# Patient Record
Sex: MALE | Race: BLACK OR AFRICAN AMERICAN | NOT HISPANIC OR LATINO | Employment: UNEMPLOYED | URBAN - METROPOLITAN AREA
[De-identification: names, ages, dates, MRNs, and addresses within clinical notes are randomized per-mention and may not be internally consistent; named-entity substitution may affect disease eponyms.]

---

## 2020-03-23 ENCOUNTER — APPOINTMENT (EMERGENCY)
Dept: RADIOLOGY | Facility: HOSPITAL | Age: 25
DRG: 386 | End: 2020-03-23
Payer: COMMERCIAL

## 2020-03-23 ENCOUNTER — HOSPITAL ENCOUNTER (INPATIENT)
Facility: HOSPITAL | Age: 25
LOS: 2 days | Discharge: HOME | DRG: 386 | End: 2020-03-25
Attending: EMERGENCY MEDICINE | Admitting: INTERNAL MEDICINE
Payer: COMMERCIAL

## 2020-03-23 DIAGNOSIS — R10.9 ABDOMINAL PAIN: ICD-10-CM

## 2020-03-23 DIAGNOSIS — K50.90 EXACERBATION OF CROHN'S DISEASE (HCC): Primary | ICD-10-CM

## 2020-03-23 PROBLEM — K52.9 ENTERITIS: Status: ACTIVE | Noted: 2020-03-23

## 2020-03-23 PROBLEM — J45.909 ASTHMA: Status: ACTIVE | Noted: 2020-03-23

## 2020-03-23 LAB
ALBUMIN SERPL BCP-MCNC: 3.3 G/DL (ref 3.5–5)
ALP SERPL-CCNC: 111 U/L (ref 46–116)
ALT SERPL W P-5'-P-CCNC: 11 U/L (ref 12–78)
ANION GAP SERPL CALCULATED.3IONS-SCNC: 9 MMOL/L (ref 4–13)
APTT PPP: 28 SECONDS (ref 23–37)
AST SERPL W P-5'-P-CCNC: 11 U/L (ref 5–45)
BASOPHILS # BLD AUTO: 0.05 THOUSANDS/ΜL (ref 0–0.1)
BASOPHILS NFR BLD AUTO: 0 % (ref 0–1)
BILIRUB SERPL-MCNC: 0.4 MG/DL (ref 0.2–1)
BUN SERPL-MCNC: 13 MG/DL (ref 5–25)
CALCIUM SERPL-MCNC: 9 MG/DL (ref 8.3–10.1)
CHLORIDE SERPL-SCNC: 100 MMOL/L (ref 100–108)
CO2 SERPL-SCNC: 29 MMOL/L (ref 21–32)
CREAT SERPL-MCNC: 1.09 MG/DL (ref 0.6–1.3)
EOSINOPHIL # BLD AUTO: 0.03 THOUSAND/ΜL (ref 0–0.61)
EOSINOPHIL NFR BLD AUTO: 0 % (ref 0–6)
ERYTHROCYTE [DISTWIDTH] IN BLOOD BY AUTOMATED COUNT: 13.5 % (ref 11.6–15.1)
GFR SERPL CREATININE-BSD FRML MDRD: 110 ML/MIN/1.73SQ M
GLUCOSE SERPL-MCNC: 105 MG/DL (ref 65–140)
HCT VFR BLD AUTO: 45.6 % (ref 36.5–49.3)
HGB BLD-MCNC: 15 G/DL (ref 12–17)
HOLD SPECIMEN: NORMAL
IMM GRANULOCYTES # BLD AUTO: 0.05 THOUSAND/UL (ref 0–0.2)
IMM GRANULOCYTES NFR BLD AUTO: 0 % (ref 0–2)
INR PPP: 1.08 (ref 0.84–1.19)
LIPASE SERPL-CCNC: 112 U/L (ref 73–393)
LYMPHOCYTES # BLD AUTO: 1 THOUSANDS/ΜL (ref 0.6–4.47)
LYMPHOCYTES NFR BLD AUTO: 7 % (ref 14–44)
MCH RBC QN AUTO: 29.1 PG (ref 26.8–34.3)
MCHC RBC AUTO-ENTMCNC: 32.9 G/DL (ref 31.4–37.4)
MCV RBC AUTO: 88 FL (ref 82–98)
MONOCYTES # BLD AUTO: 1.13 THOUSAND/ΜL (ref 0.17–1.22)
MONOCYTES NFR BLD AUTO: 8 % (ref 4–12)
NEUTROPHILS # BLD AUTO: 12.34 THOUSANDS/ΜL (ref 1.85–7.62)
NEUTS SEG NFR BLD AUTO: 85 % (ref 43–75)
NRBC BLD AUTO-RTO: 0 /100 WBCS
PLATELET # BLD AUTO: 440 THOUSANDS/UL (ref 149–390)
PMV BLD AUTO: 9 FL (ref 8.9–12.7)
POTASSIUM SERPL-SCNC: 3.9 MMOL/L (ref 3.5–5.3)
PROT SERPL-MCNC: 8.1 G/DL (ref 6.4–8.2)
PROTHROMBIN TIME: 14.3 SECONDS (ref 11.6–14.5)
RBC # BLD AUTO: 5.16 MILLION/UL (ref 3.88–5.62)
SODIUM SERPL-SCNC: 138 MMOL/L (ref 136–145)
WBC # BLD AUTO: 14.6 THOUSAND/UL (ref 4.31–10.16)

## 2020-03-23 PROCEDURE — 81401 MOPATH PROCEDURE LEVEL 2: CPT | Performed by: PHYSICIAN ASSISTANT

## 2020-03-23 PROCEDURE — 99285 EMERGENCY DEPT VISIT HI MDM: CPT | Performed by: EMERGENCY MEDICINE

## 2020-03-23 PROCEDURE — 96375 TX/PRO/DX INJ NEW DRUG ADDON: CPT

## 2020-03-23 PROCEDURE — 99285 EMERGENCY DEPT VISIT HI MDM: CPT

## 2020-03-23 PROCEDURE — 96374 THER/PROPH/DIAG INJ IV PUSH: CPT

## 2020-03-23 PROCEDURE — 85025 COMPLETE CBC W/AUTO DIFF WBC: CPT | Performed by: EMERGENCY MEDICINE

## 2020-03-23 PROCEDURE — 99222 1ST HOSP IP/OBS MODERATE 55: CPT | Performed by: INTERNAL MEDICINE

## 2020-03-23 PROCEDURE — 99223 1ST HOSP IP/OBS HIGH 75: CPT | Performed by: INTERNAL MEDICINE

## 2020-03-23 PROCEDURE — 85730 THROMBOPLASTIN TIME PARTIAL: CPT | Performed by: EMERGENCY MEDICINE

## 2020-03-23 PROCEDURE — 80053 COMPREHEN METABOLIC PANEL: CPT | Performed by: EMERGENCY MEDICINE

## 2020-03-23 PROCEDURE — 74177 CT ABD & PELVIS W/CONTRAST: CPT

## 2020-03-23 PROCEDURE — 36415 COLL VENOUS BLD VENIPUNCTURE: CPT | Performed by: EMERGENCY MEDICINE

## 2020-03-23 PROCEDURE — 82542 COL CHROMOTOGRAPHY QUAL/QUAN: CPT | Performed by: PHYSICIAN ASSISTANT

## 2020-03-23 PROCEDURE — 85610 PROTHROMBIN TIME: CPT | Performed by: EMERGENCY MEDICINE

## 2020-03-23 PROCEDURE — 83690 ASSAY OF LIPASE: CPT | Performed by: EMERGENCY MEDICINE

## 2020-03-23 PROCEDURE — 96361 HYDRATE IV INFUSION ADD-ON: CPT

## 2020-03-23 RX ORDER — METHYLPREDNISOLONE SODIUM SUCCINATE 125 MG/2ML
60 INJECTION, POWDER, LYOPHILIZED, FOR SOLUTION INTRAMUSCULAR; INTRAVENOUS ONCE
Status: COMPLETED | OUTPATIENT
Start: 2020-03-23 | End: 2020-03-23

## 2020-03-23 RX ORDER — SODIUM CHLORIDE 9 MG/ML
125 INJECTION, SOLUTION INTRAVENOUS CONTINUOUS
Status: DISCONTINUED | OUTPATIENT
Start: 2020-03-23 | End: 2020-03-25 | Stop reason: HOSPADM

## 2020-03-23 RX ORDER — KETOROLAC TROMETHAMINE 30 MG/ML
30 INJECTION, SOLUTION INTRAMUSCULAR; INTRAVENOUS ONCE
Status: COMPLETED | OUTPATIENT
Start: 2020-03-23 | End: 2020-03-23

## 2020-03-23 RX ORDER — ONDANSETRON 2 MG/ML
4 INJECTION INTRAMUSCULAR; INTRAVENOUS EVERY 6 HOURS PRN
Status: DISCONTINUED | OUTPATIENT
Start: 2020-03-23 | End: 2020-03-25 | Stop reason: HOSPADM

## 2020-03-23 RX ORDER — METHYLPREDNISOLONE SODIUM SUCCINATE 40 MG/ML
20 INJECTION, POWDER, LYOPHILIZED, FOR SOLUTION INTRAMUSCULAR; INTRAVENOUS EVERY 8 HOURS SCHEDULED
Status: DISCONTINUED | OUTPATIENT
Start: 2020-03-23 | End: 2020-03-25 | Stop reason: HOSPADM

## 2020-03-23 RX ORDER — FAMOTIDINE 20 MG/1
20 TABLET, FILM COATED ORAL 2 TIMES DAILY
Status: DISCONTINUED | OUTPATIENT
Start: 2020-03-23 | End: 2020-03-25 | Stop reason: HOSPADM

## 2020-03-23 RX ORDER — KETOROLAC TROMETHAMINE 30 MG/ML
15 INJECTION, SOLUTION INTRAMUSCULAR; INTRAVENOUS EVERY 6 HOURS PRN
Status: ACTIVE | OUTPATIENT
Start: 2020-03-23 | End: 2020-03-25

## 2020-03-23 RX ORDER — ONDANSETRON 2 MG/ML
4 INJECTION INTRAMUSCULAR; INTRAVENOUS ONCE
Status: COMPLETED | OUTPATIENT
Start: 2020-03-23 | End: 2020-03-23

## 2020-03-23 RX ADMIN — KETOROLAC TROMETHAMINE 30 MG: 30 INJECTION, SOLUTION INTRAMUSCULAR at 10:45

## 2020-03-23 RX ADMIN — METRONIDAZOLE 500 MG: 500 INJECTION, SOLUTION INTRAVENOUS at 13:14

## 2020-03-23 RX ADMIN — SODIUM CHLORIDE 1000 ML: 0.9 INJECTION, SOLUTION INTRAVENOUS at 10:12

## 2020-03-23 RX ADMIN — METHYLPREDNISOLONE SODIUM SUCCINATE 60 MG: 125 INJECTION, POWDER, FOR SOLUTION INTRAMUSCULAR; INTRAVENOUS at 13:14

## 2020-03-23 RX ADMIN — SODIUM CHLORIDE 125 ML/HR: 0.9 INJECTION, SOLUTION INTRAVENOUS at 14:52

## 2020-03-23 RX ADMIN — IOHEXOL 100 ML: 350 INJECTION, SOLUTION INTRAVENOUS at 11:28

## 2020-03-23 RX ADMIN — SODIUM CHLORIDE 125 ML/HR: 0.9 INJECTION, SOLUTION INTRAVENOUS at 22:41

## 2020-03-23 RX ADMIN — METRONIDAZOLE 500 MG: 500 INJECTION, SOLUTION INTRAVENOUS at 22:37

## 2020-03-23 RX ADMIN — METHYLPREDNISOLONE SODIUM SUCCINATE 20 MG: 40 INJECTION, POWDER, FOR SOLUTION INTRAMUSCULAR; INTRAVENOUS at 22:36

## 2020-03-23 RX ADMIN — ONDANSETRON 4 MG: 2 INJECTION INTRAMUSCULAR; INTRAVENOUS at 10:43

## 2020-03-23 RX ADMIN — FAMOTIDINE 20 MG: 10 INJECTION, SOLUTION INTRAVENOUS at 17:26

## 2020-03-23 NOTE — CONSULTS
Consultation - 126 [facility]  Dio Horn 22 y o  male MRN: [id number]  Unit/Bed#: [facility] Encounter: [id number]        Consults    Reason for Consult / Principal Problem: <principal problem not specified>    HPI: Dio Horn is a 22y o  year old male with history of Crohn's disease diagnosed at age 16 who presented to the emergency room with complaint of worsening abdominal pain and nausea  Patient said that about 2 weeks ago he had what he thought was a stomach bug, with abdominal pain, nausea, vomiting and diarrhea; the nausea vomiting resolved after about 2 days, he says diarrhea persisted for about 3 days after that and then resolved  However, abdominal pain persisted and gradually worsened  The patient gives history of Crohn's disease in both the large intestine and small intestine, he seems to think he has had strictures and obstructions in the past although he has not required any surgeries  Regarding maintenance, the patient says he has not been on any maintenance whatsoever for the last several years; his last colonoscopy was in 2016 and he is not really remember the doctor who did this procedure  He has never had EGD  The most recent gastroenterologist he remembers following with was Dr Mani Jimenez in [facility]  He said he had previously been on Lialda, but this was not felt to be effective so he was increased to biologic therapy  He thinks he was on 6-mercaptopurine at some point, he tells me thinks he stopped taking this and does not really remember when or why  He says he was tried on Humira, but I couldn't deal with sticking myself  He said Remicade also made him very sick  He said he was also advised at one point to start [facility], but was worried about side effects and did not initiate this      Upon this presentation, he was found with white count of 14 6, and CT scan with multiple abnormal findings including inflammatory changes in the right lower quadrant, thickening of the terminal ileum with poststenotic dilatation, and another stenotic area in the distal ileum also with poststenotic dilatation  There were also seen to be several fluid tracks, 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, extending from the area of terminal ileum inflammation  There is also an air-filled focus in the right upper quadrant, which could possibly have been related to 1 of these fistulas  He has been ordered for IV steroids as well as IV Flagyl  He says he had some vomiting this morning, has not had anything to eat or drink for the last several hours  He tells me that for the last few days his bowel movements have actually been soft and brown, he notes some bright red blood per rectum occasionally, he says he does feel a lump in his perianal area and shows me a picture which appears to represent an external hemorrhoid  REVIEW OF SYSTEMS:    CONSTITUTIONAL: Denies any fever, chills, or rigors  Good appetite, and no recent weight loss  HEENT: No earache or tinnitus  Denies hearing loss or visual disturbances  CARDIOVASCULAR: No chest pain or palpitations  RESPIRATORY: Denies any cough, hemoptysis, shortness of breath or dyspnea on exertion  GASTROINTESTINAL: As noted in the History of Present Illness  GENITOURINARY: No problems with urination  Denies any hematuria or dysuria  NEUROLOGIC: No dizziness or vertigo, denies headaches  MUSCULOSKELETAL: Denies any muscle or joint pain  SKIN: Denies skin rashes or itching  ENDOCRINE: Denies excessive thirst  Denies intolerance to heat or cold  PSYCHOSOCIAL: Denies depression or anxiety  Denies any recent memory loss  Historical Information   Past Medical History:   Diagnosis Date    Asthma     Crohn disease ([facility] [address] )      No past surgical history on file    Social History   Social History     Substance and Sexual Activity   Alcohol Use Never    Frequency: Never     Social History     Substance and Sexual Activity Drug Use Not on file     Social History     Tobacco Use   Smoking Status Never Smoker   Smokeless Tobacco Never Used     No family history on file  Meds/Allergies     No medications prior to admission  Current Facility-Administered Medications   Medication Dose Route Frequency    enoxaparin (LOVENOX) subcutaneous injection 40 mg  40 mg Subcutaneous Daily    famotidine (PEPCID) tablet 20 mg  20 mg Oral BID    Or    famotidine (PEPCID) injection 20 mg  20 mg Intravenous BID    ketorolac (TORADOL) injection 15 mg  15 mg Intravenous Q6H PRN    methylPREDNISolone sodium succinate (Solu-MEDROL) injection 20 mg  20 mg Intravenous Q8H [address]    metroNIDAZOLE (FLAGYL) IVPB (premix) 500 mg  500 mg Intravenous Q8H    ondansetron (ZOFRAN) injection 4 mg  4 mg Intravenous Q6H PRN    sodium chloride 0 9 % infusion  125 mL/hr Intravenous Continuous       No Known Allergies        Objective     Blood pressure 106/57, pulse 90, temperature 99 3 °F (37 4 °C), temperature source Tympanic, resp  rate 16, height 5' 5" (1 651 m), weight 58 1 kg (128 lb), SpO2 100 %  Intake/Output Summary (Last 24 hours) at 3/23/2020 1357  Last data filed at 3/23/2020 1229  Gross per 24 hour   Intake 1000 ml   Output --   Net 1000 ml         PHYSICAL EXAM     General Appearance:   Alert, cooperative, no distress, appears stated age    HEENT:   Normocephalic, atraumatic, anicteric      Neck:  Supple, symmetrical, trachea midline, no adenopathy;    thyroid: no enlargement/tenderness/nodules; no carotid  bruit or JVD    Lungs:   Clear to auscultation bilaterally; no rales, rhonchi or wheezing; respirations unlabored    Heart[de-identified]   S1 and S2 normal; regular rate and rhythm; no murmur, rub, or gallop     Abdomen:   Soft, right lower quadrant and left upper quadrant tenderness, non-distended; normal bowel sounds; no masses, no organomegaly    Genitalia:   Deferred    Rectal:   Deferred    Extremities:  No cyanosis, clubbing or edema    Pulses:  2+ and symmetric all extremities    Skin:  Skin color, texture, turgor normal, no rashes or lesions    Lymph nodes:  No palpable cervical, axillary or inguinal lymphadenopathy        Lab Results:   Admission on 03/23/2020   Component Date Value    WBC 03/23/2020 14 60*    RBC 03/23/2020 5 16     Hemoglobin 03/23/2020 15 0     Hematocrit 03/23/2020 45 6     MCV 03/23/2020 88     MCH 03/23/2020 29 1     MCHC 03/23/2020 32 9     RDW 03/23/2020 13 5     MPV 03/23/2020 9 0     Platelets 61/89/7083 440*    nRBC 03/23/2020 0     Neutrophils Relative 03/23/2020 85*    Immat GRANS % 03/23/2020 0     Lymphocytes Relative 03/23/2020 7*    Monocytes Relative 03/23/2020 8     Eosinophils Relative 03/23/2020 0     Basophils Relative 03/23/2020 0     Neutrophils Absolute 03/23/2020 12 34*    Immature Grans Absolute 03/23/2020 0 05     Lymphocytes Absolute 03/23/2020 1 00     Monocytes Absolute 03/23/2020 1 13     Eosinophils Absolute 03/23/2020 0 03     Basophils Absolute 03/23/2020 0 05     Sodium 03/23/2020 138     Potassium 03/23/2020 3 9     Chloride 03/23/2020 100     CO2 03/23/2020 29     ANION GAP 03/23/2020 9     BUN 03/23/2020 13     Creatinine 03/23/2020 1 09     Glucose 03/23/2020 105     Calcium 03/23/2020 9 0     AST 03/23/2020 11     ALT 03/23/2020 11*    Alkaline Phosphatase 03/23/2020 111     Total Protein 03/23/2020 8 1     Albumin 03/23/2020 3 3*    Total Bilirubin 03/23/2020 0 40     eGFR 03/23/2020 110     Lipase 03/23/2020 112     Extra Tube 03/23/2020 Hold for add-ons   Protime 03/23/2020 14 3     INR 03/23/2020 1 08     PTT 03/23/2020 28        Imaging Studies: I have personally reviewed pertinent reports  CT ABDOMEN AND PELVIS WITH IV CONTRAST     INDICATION:   History of Crohn's      COMPARISON:  None      TECHNIQUE:  CT examination of the abdomen and pelvis was performed   Axial, sagittal, and coronal 2D reformatted images were created from the source data and submitted for interpretation      Radiation dose length product (DLP) for this visit:  405 56 mGy-cm   This examination, like all CT scans performed in the [facility], was performed utilizing techniques to minimize radiation dose exposure, including the use of iterative   reconstruction and automated exposure control      IV Contrast:  100 mL of iohexol (OMNIPAQUE)  Enteric Contrast:  Enteric contrast was not administered      FINDINGS:     ABDOMEN     LOWER CHEST:  No clinically significant abnormality identified in the visualized lower chest      LIVER/BILIARY TREE:  Unremarkable      GALLBLADDER:  No calcified gallstones  No pericholecystic inflammatory change      SPLEEN:  Unremarkable      PANCREAS:  Unremarkable      ADRENAL GLANDS:  Unremarkable      KIDNEYS/URETERS:  Unremarkable  No hydronephrosis      STOMACH AND BOWEL:       There is marked thickening and luminal narrowing of the terminal ileum with focal upstream dilated segment of distal ileum, consistent with poststenotic dilatation (series 601 images 41 through 49)  There is a 2nd area of stenosis within the distal   ileum (series 601 image 41), also with poststenotic dilated portion of distal ileum  There are several fluid tracks with rim enhancement that extend from the diseased terminal ileum, suspicious for blind ending fistulous tracts (series 601 images 41,   42, and 45)  There is an air-filled focus within the right upper quadrant adjacent to the liver (series 2 image 34 and series 601 image 52), which is difficult to discern if this is portion of ascending colon or an air-filled tract extending from the   inflamed terminal ileum  There is marked inflammatory changes within the right lower quadrant with multiple enlarged lymph nodes present      APPENDIX:  A normal appendix was visualized (series 601 images 61 through 75)     ABDOMINOPELVIC CAVITY:  See above    Multiple enlarged lymph nodes within the right lower quadrant are present  For reference, an enlarged lymph node measures 1 2 cm in short axis (series 2 image 41)     VESSELS:  Unremarkable for patient's age      PELVIS     REPRODUCTIVE ORGANS:  Unremarkable for patient's age      URINARY BLADDER:  Unremarkable      ABDOMINAL WALL/INGUINAL REGIONS:  Unremarkable      OSSEOUS STRUCTURES:  No acute fracture or destructive osseous lesion      IMPRESSION:        1  Marked thickening and luminal narrowing of the terminal ileum with poststenotic dilatation and additional stricture with poststenotic dilated loop involving distal ileum as described above with marked adjacent inflammatory changes and reactive   lymphadenopathy, consistent with active inflammatory bowel disease  Multiple fluid tracks with rim enhancement are present, suspicious for blind-ending fistulous tracts  An air pocket within the right upper quadrant adjacent to the liver is present and   difficult to determine if this represents an air-filled tract extending from the inflamed ileum or portion of ascending colon         2  Normal appendix identified          ASSESSMENT/PLAN:     1  Significant enteritis involving the terminal ileum and another segment of distal ileum, with what appear to be 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, as well as poststenotic dilatation concerning for early partial small-bowel obstruction  The patient has unfortunately not followed with GI or had any maintenance therapy for years, despite longstanding Crohn's disease, patient appears to be experiencing complications as a result of nonadherence with therapy  The patient reports previously having had suboptimal response to 5-ASA, adverse effects with Remicade, nonadherence with 6-MP and Humira, and never having started Entyvio      - I discussed patient's case and plan with [facility]  - keep patient NPO for now, IV fluids; if patient has intractable vomiting, consider use of an NG tube for gastric decompression  - agree with starting IV steroids, Solu-Medrol 20 mg q 8 hours  - also agree with starting IV Flagyl, 500 mg q 8 hours  - will check pre-biologic treatment labs including TB QuantiFERON testing, TPMT testing, chronic hepatitis panel  - check inflammatory markers including CRP, sed rate, fecal calprotectin  - will need to arrange for maintenance therapy upon patient's eventual discharge  - I strongly advised patient regarding the importance of adherence with maintenance therapy for Crohn's disease; I advised patient regarding the significant morbidity associated with untreated Crohn's including necessitation of surgery, development of fistulas or abscess, and even death  - will discuss with attending; consider surgical consultation; depending on his clinical course he may require resection of terminal ileum and/or fistula takedown once his inflammation is hopefully better controlled      The patient was seen and examined by Dr Antonio Mejia, all ravi medical decisions were made with Dr Antonio Mejia  Thank you for allowing us to participate in the care of this pleasant patient  We will follow up with you closely

## 2020-03-23 NOTE — PLAN OF CARE
Problem: Nutrition/Hydration-ADULT  Goal: Nutrient/Hydration intake appropriate for improving, restoring or maintaining nutritional needs  Description  Monitor and assess patient's nutrition/hydration status for malnutrition  Collaborate with interdisciplinary team and initiate plan and interventions as ordered  Monitor patient's weight and dietary intake as ordered or per policy  Utilize nutrition screening tool and intervene as necessary  Determine patient's food preferences and provide high-protein, high-caloric foods as appropriate       INTERVENTIONS:  - Monitor oral intake, urinary output, labs, and treatment plans  - Assess nutrition and hydration status and recommend course of action  - Evaluate amount of meals eaten  - Assist patient with eating if necessary   - Allow adequate time for meals  - Recommend/ encourage appropriate diets, oral nutritional supplements, and vitamin/mineral supplements  - Order, calculate, and assess calorie counts as needed  - Assess need for intravenous fluids  - Provide specific nutrition/hydration education as appropriate   Outcome: Progressing     Problem: GASTROINTESTINAL - ADULT  Goal: Minimal or absence of nausea and/or vomiting  Description  INTERVENTIONS:  - Administer IV fluids if ordered to ensure adequate hydration  - Maintain NPO status until nausea and vomiting are resolved  - Administer ordered antiemetic medications as needed  - Provide nonpharmacologic comfort measures as appropriate  - Advance diet as tolerated, if ordered  - Consider nutrition services referral to assist patient with adequate nutrition and appropriate food choices   Outcome: Progressing  Goal: Maintains or returns to baseline bowel function  Description  INTERVENTIONS:  - Assess bowel function  - Encourage oral fluids to ensure adequate hydration  - Administer IV fluids if ordered to ensure adequate hydration  - Administer ordered medications as needed  - Encourage mobilization and activity  - Consider nutritional services referral to assist patient with adequate nutrition and appropriate food choices  Outcome: Progressing  Goal: Maintains adequate nutritional intake  Description  INTERVENTIONS:  - Monitor percentage of each meal consumed  - Identify factors contributing to decreased intake, treat as appropriate  - Assist with meals as needed  - Monitor I&O, weight, and lab values if indicated  - Obtain nutrition services referral as needed  Outcome: Progressing     Problem: PAIN - ADULT  Goal: Verbalizes/displays adequate comfort level or baseline comfort level  Description  Interventions:  - Encourage patient to monitor pain and request assistance  - Assess pain using appropriate pain scale  - Administer analgesics based on type and severity of pain and evaluate response  - Implement non-pharmacological measures as appropriate and evaluate response  - Consider cultural and social influences on pain and pain management  - Notify physician/advanced practitioner if interventions unsuccessful or patient reports new pain  Outcome: Progressing

## 2020-03-23 NOTE — ED PROVIDER NOTES
History  Chief Complaint   Patient presents with    Abdominal Pain     Pt history of Crohn's, c/o abdominal pain and nausea  Patient is a 20-year-old male that presents with complaint of approximately several days of diffuse abdominal pain associated with nausea and vomiting  Patient states he has had a gastroenteritis approximately 2 weeks ago where he had vomiting and diarrhea but had resolved for over a week but the belly pain continued  He did have 1 episode of nausea and vomiting this morning  Patient denies any bloody bowel movements  Patient denies any fevers or chills, no cough cold or flu-like symptoms  Patient has not been on any treatment for Crohn's for many years  Patient states he was 1st diagnosed when he was 16  None       Past Medical History:   Diagnosis Date    Asthma     Crohn disease ([facility] [address] )        No past surgical history on file  No family history on file  I have reviewed and agree with the history as documented  E-Cigarette/Vaping     E-Cigarette/Vaping Substances     Social History     Tobacco Use    Smoking status: Never Smoker    Smokeless tobacco: Never Used   Substance Use Topics    Alcohol use: Never     Frequency: Never    Drug use: Not on file       Review of Systems   Constitutional: Negative for chills and fever  HENT: Negative for facial swelling and trouble swallowing  Respiratory: Negative for chest tightness and shortness of breath  Cardiovascular: Negative for chest pain and leg swelling  Gastrointestinal: Positive for abdominal pain, diarrhea, nausea and vomiting  Negative for abdominal distention  Genitourinary: Negative for dysuria and flank pain  Musculoskeletal: Negative for back pain and neck pain  Skin: Negative  Neurological: Negative for weakness, numbness and headaches  Hematological: Negative  Psychiatric/Behavioral: Negative          Physical Exam  Physical Exam   Constitutional: He is oriented to person, place, and time  He appears well-developed and well-nourished  HENT:   Head: Normocephalic and atraumatic  Eyes: Pupils are equal, round, and reactive to light  EOM are normal    Cardiovascular: Normal rate and regular rhythm  Abdominal: Soft  Normal appearance  There is generalized tenderness  There is no rebound and no guarding  Neurological: He is alert and oriented to person, place, and time  Skin: Skin is warm and dry  Capillary refill takes 2 to 3 seconds  Psychiatric: He has a normal mood and affect  His behavior is normal    Nursing note and vitals reviewed        Vital Signs  ED Triage Vitals   Temperature Pulse Respirations Blood Pressure SpO2   03/23/20 0955 03/23/20 0955 03/23/20 0955 03/23/20 0955 03/23/20 0955   99 3 °F (37 4 °C) 100 16 147/86 100 %      Temp Source Heart Rate Source Patient Position - Orthostatic VS BP Location FiO2 (%)   03/23/20 0955 03/23/20 1047 03/23/20 0955 03/23/20 0955 --   Tympanic Monitor Lying Right arm       Pain Score       03/23/20 0955       7           Vitals:    03/23/20 0955 03/23/20 1047 03/23/20 1247   BP: 147/86 129/74 106/57   Pulse: 100 104 90   Patient Position - Orthostatic VS: Lying Lying Lying         Visual Acuity      ED Medications  Medications   metroNIDAZOLE (FLAGYL) IVPB (premix) 500 mg (has no administration in time range)   methylPREDNISolone sodium succinate (Solu-MEDROL) injection 60 mg (has no administration in time range)   ondansetron (ZOFRAN) injection 4 mg (4 mg Intravenous Given 3/23/20 1043)   sodium chloride 0 9 % bolus 1,000 mL (0 mL Intravenous Stopped 3/23/20 1229)   ketorolac (TORADOL) injection 30 mg (30 mg Intravenous Given 3/23/20 1045)   iohexol (OMNIPAQUE) 350 MG/ML injection (MULTI-DOSE) 100 mL (100 mL Intravenous Given 3/23/20 1128)       Diagnostic Studies  Results Reviewed     Procedure Component Value Units Date/Time    Comprehensive metabolic panel [[id number]]  (Abnormal) Collected:  03/23/20 1031    Lab Status:  Final result Specimen:  Blood from Arm, Left Updated:  03/23/20 1055     Sodium 138 mmol/L      Potassium 3 9 mmol/L      Chloride 100 mmol/L      CO2 29 mmol/L      ANION GAP 9 mmol/L      BUN 13 mg/dL      Creatinine 1 09 mg/dL      Glucose 105 mg/dL      Calcium 9 0 mg/dL      AST 11 U/L      ALT 11 U/L      Alkaline Phosphatase 111 U/L      Total Protein 8 1 g/dL      Albumin 3 3 g/dL      Total Bilirubin 0 40 mg/dL      eGFR 110 ml/min/1 73sq m     Narrative:       [place] guidelines for Chronic Kidney Disease (CKD):     Stage 1 with normal or high GFR (GFR > 90 mL/min/1 73 square meters)    Stage 2 Mild CKD (GFR = 60-89 mL/min/1 73 square meters)    Stage 3A Moderate CKD (GFR = 45-59 mL/min/1 73 square meters)    Stage 3B Moderate CKD (GFR = 30-44 mL/min/1 73 square meters)    Stage 4 Severe CKD (GFR = 15-29 mL/min/1 73 square meters)    Stage 5 End Stage CKD (GFR <15 mL/min/1 73 square meters)  Note: GFR calculation is accurate only with a steady state creatinine    Lipase [[id number]]  (Normal) Collected:  03/23/20 1031    Lab Status:  Final result Specimen:  Blood from Arm, Left Updated:  03/23/20 1055     Lipase 112 u/L     Protime-INR [[id number]]  (Normal) Collected:  03/23/20 1035    Lab Status:  Final result Specimen:  Blood from Arm, Left Updated:  03/23/20 1051     Protime 14 3 seconds      INR 1 08    APTT [[id number]]  (Normal) Collected:  03/23/20 1035    Lab Status:  Final result Specimen:  Blood from Arm, Left Updated:  03/23/20 1051     PTT 28 seconds     CBC and differential [[id number]]  (Abnormal) Collected:  03/23/20 1031    Lab Status:  Final result Specimen:  Blood from Arm, Left Updated:  03/23/20 1041     WBC 14 60 Thousand/uL      RBC 5 16 Million/uL      Hemoglobin 15 0 g/dL      Hematocrit 45 6 %      MCV 88 fL      MCH 29 1 pg      MCHC 32 9 g/dL      RDW 13 5 %      MPV 9 0 fL      Platelets 289 Thousands/uL      nRBC 0 /100 WBCs      Neutrophils Relative 85 %      Immat GRANS % 0 %      Lymphocytes Relative 7 %      Monocytes Relative 8 %      Eosinophils Relative 0 %      Basophils Relative 0 %      Neutrophils Absolute 12 34 Thousands/µL      Immature Grans Absolute 0 05 Thousand/uL      Lymphocytes Absolute 1 00 Thousands/µL      Monocytes Absolute 1 13 Thousand/µL      Eosinophils Absolute 0 03 Thousand/µL      Basophils Absolute 0 05 Thousands/µL                  CT abdomen pelvis with contrast   Final Result by Ophelia Schaeffer MD (03/23 7422)         1  Marked thickening and luminal narrowing of the terminal ileum with poststenotic dilatation and additional stricture with poststenotic dilated loop involving distal ileum as described above with marked adjacent inflammatory changes and reactive    lymphadenopathy, consistent with active inflammatory bowel disease  Multiple fluid tracks with rim enhancement are present, suspicious for blind-ending fistulous tracts  An air pocket within the right upper quadrant adjacent to the liver is present and    difficult to determine if this represents an air-filled tract extending from the inflamed ileum or portion of ascending colon  2   Normal appendix identified  The study was marked in [facility] for immediate notification  Workstation performed: [id number]                    Procedures  Procedures         ED Course                                 MDM  Number of Diagnoses or Management Options  Abdominal pain:   Exacerbation of Crohn's disease [facility]):   Diagnosis management comments: Patient has a elevated white count and the significant findings that are consistent with a Crohn's exacerbation  I discussed the findings with the patient and his mother, I answered all questions the best my ability  Patient verbalizes understanding and is in agreement with the assessment and plan         Amount and/or Complexity of Data Reviewed  Clinical lab tests: ordered and reviewed  Tests in the radiology section of CPT®: ordered and reviewed          Disposition  Final diagnoses:   Exacerbation of Crohn's disease ([facility] [address] )   Abdominal pain     Time reflects when diagnosis was documented in both MDM as applicable and the Disposition within this note     Time User Action Codes Description Comment    3/23/2020  1:02 PM Will Elo Add [K50 90] Exacerbation of Crohn's disease ([facility] [address] )     3/23/2020  1:02 PM Will Gasca Add [R10 9] Abdominal pain       ED Disposition     ED Disposition Condition Date/Time Comment    Admit Stable Mon Mar 23, 2020  1:01 PM Case was discussed with Dr Sita Robison and the patient's admission status was agreed to be Admission Status: inpatient status to the service of Dr Sita Robison   Follow-up Information    None         Patient's Medications    No medications on file     No discharge procedures on file      PDMP Review     None          ED Provider  Electronically Signed by           Danielito Campos MD  03/23/20 4862

## 2020-03-23 NOTE — ASSESSMENT & PLAN NOTE
Patient noted to have significant inflammatory changes in the terminal ileum  Patient also noted to have elevated white count  Stool for bacterial panel and Clostridium difficile toxin were ordered  Patient started on Flagyl 500 milligram IV q 8 hours

## 2020-03-23 NOTE — H&P
H&P- Ilene Ceci 1995, 22 y o  male MRN: [id number]    Unit/Bed#: [facility] Encounter: [id number]    Primary Care Provider: Shabnam Marvin   Date and time admitted to hospital: 3/23/2020  9:50 AM        * Exacerbation of Crohn's disease [facility])  Assessment & Plan  Patient present to the emergency room with significant abdominal pain  Patient has history of Crohn's disease and has been off treatment for the past 2 years  CT scan of the abdomen pelvis in the she showed marked thickening with luminal narrowing of the terminal ileum with poststenotic dilatation and is not stricture with poststenotic dilated loop involving distal ileum with marked adjacent inflammatory changes and reactive lymphadenopathy  Multiple fluid tracks suspicious for blind ending fistulous tracts  In her pocket within the right upper quadrant adjacent to the liver  Patient is seen in consultation with GI  Patient will be started on Solu-Medrol 20 milligram IV q 8 hours along with Flagyl 500 milligram IV q 8 hours  Patient previously was on 5 ASA with suboptimal response  Patient had adverse effects with Remicade  Patient had nonadherence with 6 MP and Humira  ESR and CRP were ordered  GI ordered pre biologic treatment labs-TB QuantiFERON testing, TPMT testing, chronic hepatitis panel      Enteritis  Assessment & Plan  Patient noted to have significant inflammatory changes in the terminal ileum  Patient also noted to have elevated white count  Stool for bacterial panel and Clostridium difficile toxin were ordered  Patient started on Flagyl 500 milligram IV q 8 hours    Asthma  Assessment & Plan  No evidence of exacerbation  Patient does not take any medications at home      VTE Prophylaxis: Enoxaparin (Lovenox)  / sequential compression device   Code Status: Level 1 - Full Code    Anticipated Length of Stay:  Patient will be admitted on an Inpatient basis with an anticipated length of stay of  more than 2 midnights  Justification for Hospital Stay:  Acute exacerbation of Crohn's disease, enteritis    Total Time for Visit, including Counseling / Coordination of Care: 1 hour  Greater than 50% of this total time spent on direct patient counseling and coordination of care  Chief Complaint:   Abdominal Pain (Pt history of Crohn's, c/o abdominal pain and nausea )      History of Present Illness:    Maria Luisa Hunter is a 22 y o  male with a PMH of Crohn's disease who presents with abdominal pain for the past 2 weeks  Patient reports he was diagnosed with Crohn's disease at the age of 16  Patient reports he might have had a stomach bug about 2 weeks ago at which time patient had intractable nausea, vomiting, abdominal pain and diarrhea  The nausea vomiting and diarrhea have resolved in about 2-3 days  Abdominal pain persisted which is constant in nature across upper abdomen  Patient had 1 episode of vomiting again this morning  Patient denies any fever, chills, chest pain, shortness of breath  Patient reports normal bowel movement yesterday morning  Patient has not been on any treatment for Crohn's for the past 2 years  Patient's last colonoscopy was in 2016 and never had an EGD  Patient was previously treated with Lialda, 6 MP, Humira and Remicade  In the ED patient had elevated [place] of [address] and CT scan showed significant inflammatory changes around the terminal ileum with multiple blind-ending fistulous    Review of Systems:    Review of Systems   Constitutional: Negative for chills, diaphoresis, fatigue and unexpected weight change  HENT: Negative for congestion, ear discharge, ear pain, facial swelling, hearing loss, mouth sores, nosebleeds, postnasal drip, rhinorrhea, sinus pressure, sneezing, sore throat, tinnitus, trouble swallowing and voice change  Eyes: Negative for photophobia, discharge, redness and visual disturbance     Respiratory: Negative for cough, chest tightness, shortness of breath, wheezing and stridor  Cardiovascular: Negative for chest pain, palpitations and leg swelling  Gastrointestinal: Positive for abdominal pain and vomiting  Negative for abdominal distention, anal bleeding, blood in stool, constipation, diarrhea and nausea  Endocrine: Negative for polydipsia, polyphagia and polyuria  Genitourinary: Negative for decreased urine volume, difficulty urinating, dysuria, flank pain, frequency, hematuria and urgency  Musculoskeletal: Negative for arthralgias, back pain and neck stiffness  Skin: Negative for pallor and rash  Neurological: Negative for dizziness, seizures, facial asymmetry, speech difficulty, light-headedness, numbness and headaches  Hematological: Negative for adenopathy  Does not bruise/bleed easily  Psychiatric/Behavioral: Negative for agitation and confusion  Past Medical and Surgical History:     Past Medical History:   Diagnosis Date    Asthma     [facility]        No past surgical history on file      Meds/Allergies:    Prior to Admission medications    Not on File       Allergies: No Known Allergies    Social History:     Marital Status: Single   Substance Use History:   Social History     Substance and Sexual Activity   Alcohol Use Never    Frequency: Never     Social History     Tobacco Use   Smoking Status Never Smoker   Smokeless Tobacco Never Used     Social History     Substance and Sexual Activity   Drug Use Not on file       Family History:    Family History   Problem Relation Age of Onset    Asthma Mother        Physical Exam:     Vitals:   Blood Pressure: 96/60 (03/23/20 1359)  Pulse: 100 (03/23/20 1359)  Temperature: 98 9 °F (37 2 °C) (03/23/20 1359)  Temp Source: Oral (03/23/20 1359)  Respirations: 15 (03/23/20 1359)  Height: 5' 5" (165 1 cm) (03/23/20 0955)  Weight - Scale: 58 1 kg (128 lb) (03/23/20 0955)  SpO2: 99 % (03/23/20 1359)    Physical Exam      Additional Data:     Lab Results: I have personally reviewed pertinent films in PACS    Results from last 7 days   Lab Units 03/23/20  1031   WBC Thousand/uL 14 60*   HEMOGLOBIN g/dL 15 0   HEMATOCRIT % 45 6   PLATELETS Thousands/uL 440*   NEUTROS PCT % 85*     Results from last 7 days   Lab Units 03/23/20  1031   SODIUM mmol/L 138   POTASSIUM mmol/L 3 9   CHLORIDE mmol/L 100   CO2 mmol/L 29   BUN mg/dL 13   CREATININE mg/dL 1 09   CALCIUM mg/dL 9 0   TOTAL BILIRUBIN mg/dL 0 40   ALK PHOS U/L 111   ALT U/L 11*   AST U/L 11     Results from last 7 days   Lab Units 03/23/20  1035   INR  1 08                   Imaging: I have personally reviewed pertinent films in PACS    CT abdomen pelvis with contrast   Final Result by Kajal Nixon MD (03/23 1235)         1  Marked thickening and luminal narrowing of the terminal ileum with poststenotic dilatation and additional stricture with poststenotic dilated loop involving distal ileum as described above with marked adjacent inflammatory changes and reactive    lymphadenopathy, consistent with active inflammatory bowel disease  Multiple fluid tracks with rim enhancement are present, suspicious for blind-ending fistulous tracts  An air pocket within the right upper quadrant adjacent to the liver is present and    difficult to determine if this represents an air-filled tract extending from the inflamed ileum or portion of ascending colon  2   Normal appendix identified  The study was marked in [facility] for immediate notification  Workstation performed: [id number]             CT abdomen pelvis with contrast   Final Result         1  Marked thickening and luminal narrowing of the terminal ileum with poststenotic dilatation and additional stricture with poststenotic dilated loop involving distal ileum as described above with marked adjacent inflammatory changes and reactive    lymphadenopathy, consistent with active inflammatory bowel disease    Multiple fluid tracks with rim enhancement are present, suspicious for blind-ending fistulous tracts  An air pocket within the right upper quadrant adjacent to the liver is present and    difficult to determine if this represents an air-filled tract extending from the inflamed ileum or portion of ascending colon  2   Normal appendix identified  The study was marked in [facility] for immediate notification  Workstation performed: [id number]             EKG, Pathology, and Other Studies Reviewed on Admission:   · EKG:     Allscripts / Epic Records Reviewed: Yes     ** Please Note: This note has been constructed using a voice recognition system   **

## 2020-03-23 NOTE — ASSESSMENT & PLAN NOTE
Patient present to the emergency room with significant abdominal pain  Patient has history of Crohn's disease and has been off treatment for the past 2 years  CT scan of the abdomen pelvis in the she showed marked thickening with luminal narrowing of the terminal ileum with poststenotic dilatation and is not stricture with poststenotic dilated loop involving distal ileum with marked adjacent inflammatory changes and reactive lymphadenopathy  Multiple fluid tracks suspicious for blind ending fistulous tracts  In her pocket within the right upper quadrant adjacent to the liver  Patient is seen in consultation with GI  Patient will be started on Solu-Medrol 20 milligram IV q 8 hours along with Flagyl 500 milligram IV q 8 hours  Patient previously was on 5 ASA with suboptimal response  Patient had adverse effects with Remicade  Patient had nonadherence with 6 MP and Humira  ESR and CRP were ordered    GI ordered pre biologic treatment labs-TB QuantiFERON testing, TPMT testing, chronic hepatitis panel

## 2020-03-24 LAB
ANION GAP SERPL CALCULATED.3IONS-SCNC: 9 MMOL/L (ref 4–13)
BASOPHILS # BLD AUTO: 0.02 THOUSANDS/ΜL (ref 0–0.1)
BASOPHILS NFR BLD AUTO: 0 % (ref 0–1)
BUN SERPL-MCNC: 19 MG/DL (ref 5–25)
CALCIUM SERPL-MCNC: 9.2 MG/DL (ref 8.3–10.1)
CHLORIDE SERPL-SCNC: 101 MMOL/L (ref 100–108)
CO2 SERPL-SCNC: 26 MMOL/L (ref 21–32)
CREAT SERPL-MCNC: 1.1 MG/DL (ref 0.6–1.3)
CRP SERPL QL: 110.6 MG/L
EOSINOPHIL # BLD AUTO: 0 THOUSAND/ΜL (ref 0–0.61)
EOSINOPHIL NFR BLD AUTO: 0 % (ref 0–6)
ERYTHROCYTE [DISTWIDTH] IN BLOOD BY AUTOMATED COUNT: 13.3 % (ref 11.6–15.1)
ERYTHROCYTE [SEDIMENTATION RATE] IN BLOOD: 25 MM/HOUR (ref 2–10)
GFR SERPL CREATININE-BSD FRML MDRD: 109 ML/MIN/1.73SQ M
GLUCOSE SERPL-MCNC: 119 MG/DL (ref 65–140)
HBV CORE AB SER QL: NORMAL
HBV CORE IGM SER QL: NORMAL
HBV SURFACE AG SER QL: NORMAL
HCT VFR BLD AUTO: 39.2 % (ref 36.5–49.3)
HCV AB SER QL: NORMAL
HGB BLD-MCNC: 12.5 G/DL (ref 12–17)
IMM GRANULOCYTES # BLD AUTO: 0.18 THOUSAND/UL (ref 0–0.2)
IMM GRANULOCYTES NFR BLD AUTO: 1 % (ref 0–2)
LYMPHOCYTES # BLD AUTO: 1.23 THOUSANDS/ΜL (ref 0.6–4.47)
LYMPHOCYTES NFR BLD AUTO: 9 % (ref 14–44)
MCH RBC QN AUTO: 28.5 PG (ref 26.8–34.3)
MCHC RBC AUTO-ENTMCNC: 31.9 G/DL (ref 31.4–37.4)
MCV RBC AUTO: 89 FL (ref 82–98)
MONOCYTES # BLD AUTO: 0.29 THOUSAND/ΜL (ref 0.17–1.22)
MONOCYTES NFR BLD AUTO: 2 % (ref 4–12)
NEUTROPHILS # BLD AUTO: 12.74 THOUSANDS/ΜL (ref 1.85–7.62)
NEUTS SEG NFR BLD AUTO: 88 % (ref 43–75)
NRBC BLD AUTO-RTO: 0 /100 WBCS
PLATELET # BLD AUTO: 385 THOUSANDS/UL (ref 149–390)
PMV BLD AUTO: 9.3 FL (ref 8.9–12.7)
POTASSIUM SERPL-SCNC: 4.1 MMOL/L (ref 3.5–5.3)
RBC # BLD AUTO: 4.39 MILLION/UL (ref 3.88–5.62)
SODIUM SERPL-SCNC: 136 MMOL/L (ref 136–145)
WBC # BLD AUTO: 14.46 THOUSAND/UL (ref 4.31–10.16)

## 2020-03-24 PROCEDURE — 99232 SBSQ HOSP IP/OBS MODERATE 35: CPT | Performed by: FAMILY MEDICINE

## 2020-03-24 PROCEDURE — 86140 C-REACTIVE PROTEIN: CPT | Performed by: INTERNAL MEDICINE

## 2020-03-24 PROCEDURE — 86803 HEPATITIS C AB TEST: CPT | Performed by: INTERNAL MEDICINE

## 2020-03-24 PROCEDURE — 86704 HEP B CORE ANTIBODY TOTAL: CPT | Performed by: INTERNAL MEDICINE

## 2020-03-24 PROCEDURE — 87340 HEPATITIS B SURFACE AG IA: CPT | Performed by: INTERNAL MEDICINE

## 2020-03-24 PROCEDURE — 99232 SBSQ HOSP IP/OBS MODERATE 35: CPT | Performed by: INTERNAL MEDICINE

## 2020-03-24 PROCEDURE — 86705 HEP B CORE ANTIBODY IGM: CPT | Performed by: INTERNAL MEDICINE

## 2020-03-24 PROCEDURE — 85025 COMPLETE CBC W/AUTO DIFF WBC: CPT | Performed by: INTERNAL MEDICINE

## 2020-03-24 PROCEDURE — 80048 BASIC METABOLIC PNL TOTAL CA: CPT | Performed by: INTERNAL MEDICINE

## 2020-03-24 PROCEDURE — 86480 TB TEST CELL IMMUN MEASURE: CPT | Performed by: INTERNAL MEDICINE

## 2020-03-24 PROCEDURE — 85652 RBC SED RATE AUTOMATED: CPT | Performed by: INTERNAL MEDICINE

## 2020-03-24 RX ADMIN — METHYLPREDNISOLONE SODIUM SUCCINATE 20 MG: 40 INJECTION, POWDER, FOR SOLUTION INTRAMUSCULAR; INTRAVENOUS at 14:21

## 2020-03-24 RX ADMIN — METHYLPREDNISOLONE SODIUM SUCCINATE 20 MG: 40 INJECTION, POWDER, FOR SOLUTION INTRAMUSCULAR; INTRAVENOUS at 05:59

## 2020-03-24 RX ADMIN — SODIUM CHLORIDE 125 ML/HR: 0.9 INJECTION, SOLUTION INTRAVENOUS at 09:16

## 2020-03-24 RX ADMIN — METRONIDAZOLE 500 MG: 500 INJECTION, SOLUTION INTRAVENOUS at 14:22

## 2020-03-24 RX ADMIN — METRONIDAZOLE 500 MG: 500 INJECTION, SOLUTION INTRAVENOUS at 06:00

## 2020-03-24 RX ADMIN — METRONIDAZOLE 500 MG: 500 INJECTION, SOLUTION INTRAVENOUS at 21:12

## 2020-03-24 RX ADMIN — FAMOTIDINE 20 MG: 10 INJECTION, SOLUTION INTRAVENOUS at 09:16

## 2020-03-24 RX ADMIN — METHYLPREDNISOLONE SODIUM SUCCINATE 20 MG: 40 INJECTION, POWDER, FOR SOLUTION INTRAMUSCULAR; INTRAVENOUS at 21:09

## 2020-03-24 RX ADMIN — FAMOTIDINE 20 MG: 20 TABLET, FILM COATED ORAL at 17:29

## 2020-03-24 RX ADMIN — SODIUM CHLORIDE 125 ML/HR: 0.9 INJECTION, SOLUTION INTRAVENOUS at 17:49

## 2020-03-24 NOTE — PROGRESS NOTES
Progress Note - Maranda Kirk 22 y o  male MRN: [id number]    Unit/Bed#: 2 [place] [address] Encounter: [id number]        Subjective:   Patient reports actually feeling a fair amount better than yesterday, he says he has much less pain in his right lower quadrant than he did yesterday  Denies any vomiting episodes, reports passing a lot of gas but no stools  Denies any rectal bleeding    Objective:     Vitals: Blood pressure 111/66, pulse 94, temperature 98 4 °F (36 9 °C), resp  rate 19, height 5' 5" (1 651 m), weight 58 1 kg (128 lb), SpO2 100 %  ,Body mass index is 21 3 kg/m²  Intake/Output Summary (Last 24 hours) at 3/24/2020 5040  Last data filed at 3/24/2020 9647  Gross per 24 hour   Intake 3300 ml   Output --   Net 3300 ml       Physical Exam:   General appearance: alert, appears stated age and cooperative  Lungs: clear to auscultation bilaterally, no labored breathing/accessory muscle use  Heart: regular rate and rhythm, S1, S2 normal, no murmur, click, rub or gallop  Abdomen: soft, non-tender; bowel sounds normal; no masses,  no organomegaly  Extremities: no edema    Invasive Devices     Peripheral Intravenous Line            Peripheral IV 03/23/20 Right Arm less than 1 day                Lab, Imaging and other studies: I have personally reviewed pertinent reports      Admission on 03/23/2020   Component Date Value    WBC 03/23/2020 14 60*    RBC 03/23/2020 5 16     Hemoglobin 03/23/2020 15 0     Hematocrit 03/23/2020 45 6     MCV 03/23/2020 88     MCH 03/23/2020 29 1     MCHC 03/23/2020 32 9     RDW 03/23/2020 13 5     MPV 03/23/2020 9 0     Platelets 65/87/0890 440*    nRBC 03/23/2020 0     Neutrophils Relative 03/23/2020 85*    Immat GRANS % 03/23/2020 0     Lymphocytes Relative 03/23/2020 7*    Monocytes Relative 03/23/2020 8     Eosinophils Relative 03/23/2020 0     Basophils Relative 03/23/2020 0     Neutrophils Absolute 03/23/2020 12 34*    Immature Grans Absolute 03/23/2020 0 05     Lymphocytes Absolute 03/23/2020 1 00     Monocytes Absolute 03/23/2020 1 13     Eosinophils Absolute 03/23/2020 0 03     Basophils Absolute 03/23/2020 0 05     Sodium 03/23/2020 138     Potassium 03/23/2020 3 9     Chloride 03/23/2020 100     CO2 03/23/2020 29     ANION GAP 03/23/2020 9     BUN 03/23/2020 13     Creatinine 03/23/2020 1 09     Glucose 03/23/2020 105     Calcium 03/23/2020 9 0     AST 03/23/2020 11     ALT 03/23/2020 11*    Alkaline Phosphatase 03/23/2020 111     Total Protein 03/23/2020 8 1     Albumin 03/23/2020 3 3*    Total Bilirubin 03/23/2020 0 40     eGFR 03/23/2020 110     Lipase 03/23/2020 112     Extra Tube 03/23/2020 Hold for add-ons   Protime 03/23/2020 14 3     INR 03/23/2020 1 08     PTT 03/23/2020 28     Sodium 03/24/2020 136     Potassium 03/24/2020 4 1     Chloride 03/24/2020 101     CO2 03/24/2020 26     ANION GAP 03/24/2020 9     BUN 03/24/2020 19     Creatinine 03/24/2020 1 10     Glucose 03/24/2020 119     Calcium 03/24/2020 9 2     eGFR 03/24/2020 109     WBC 03/24/2020 14 46*    RBC 03/24/2020 4 39     Hemoglobin 03/24/2020 12 5     Hematocrit 03/24/2020 39 2     MCV 03/24/2020 89     MCH 03/24/2020 28 5     MCHC 03/24/2020 31 9     RDW 03/24/2020 13 3     MPV 03/24/2020 9 3     Platelets 40/64/8858 385     nRBC 03/24/2020 0     Neutrophils Relative 03/24/2020 88*    Immat GRANS % 03/24/2020 1     Lymphocytes Relative 03/24/2020 9*    Monocytes Relative 03/24/2020 2*    Eosinophils Relative 03/24/2020 0     Basophils Relative 03/24/2020 0     Neutrophils Absolute 03/24/2020 12 74*    Immature Grans Absolute 03/24/2020 0 18     Lymphocytes Absolute 03/24/2020 1 23     Monocytes Absolute 03/24/2020 0 29     Eosinophils Absolute 03/24/2020 0 00     Basophils Absolute 03/24/2020 0 02     CRP 03/24/2020 110 6*    Sed Rate 03/24/2020 25*     Results for Sasha Lopez (MRN [id number]) as of 3/24/2020 09:42   Ref   Range 3/23/2020 10:31 3/23/2020 10:35 3/24/2020 05:49   Sodium Latest Ref Range: 136 - 145 mmol/L 138  136   Potassium Latest Ref Range: 3 5 - 5 3 mmol/L 3 9  4 1   Chloride Latest Ref Range: 100 - 108 mmol/L 100  101   CO2 Latest Ref Range: 21 - 32 mmol/L 29  26   Anion Gap Latest Ref Range: 4 - 13 mmol/L 9  9   BUN Latest Ref Range: 5 - 25 mg/dL 13  19   Creatinine Latest Ref Range: 0 60 - 1 30 mg/dL 1 09  1 10   Glucose, Random Latest Ref Range: 65 - 140 mg/dL 105  119   Calcium Latest Ref Range: 8 3 - 10 1 mg/dL 9 0  9 2   AST Latest Ref Range: 5 - 45 U/L 11     ALT Latest Ref Range: 12 - 78 U/L 11 (L)     Alkaline Phosphatase Latest Ref Range: 46 - 116 U/L 111     Total Protein Latest Ref Range: 6 4 - 8 2 g/dL 8 1     Albumin Latest Ref Range: 3 5 - 5 0 g/dL 3 3 (L)     TOTAL BILIRUBIN Latest Ref Range: 0 20 - 1 00 mg/dL 0 40     eGFR Latest Units: ml/min/1 73sq m 110  109   Lipase Latest Ref Range: 73 - 393 u/L 112     WBC Latest Ref Range: 4 31 - 10 16 Thousand/uL 14 60 (H)  14 46 (H)   Red Blood Cell Count Latest Ref Range: 3 88 - 5 62 Million/uL 5 16  4 39   Hemoglobin Latest Ref Range: 12 0 - 17 0 g/dL 15 0  12 5   HCT Latest Ref Range: 36 5 - 49 3 % 45 6  39 2   MCV Latest Ref Range: 82 - 98 fL 88  89   MCH Latest Ref Range: 26 8 - 34 3 pg 29 1  28 5   MCHC Latest Ref Range: 31 4 - 37 4 g/dL 32 9  31 9   RDW Latest Ref Range: 11 6 - 15 1 % 13 5  13 3   Platelet Count Latest Ref Range: 149 - 390 Thousands/uL 440 (H)  385   MPV Latest Ref Range: 8 9 - 12 7 fL 9 0  9 3   nRBC Latest Units: /100 WBCs 0  0   Neutrophils % Latest Ref Range: 43 - 75 % 85 (H)  88 (H)   Immat GRANS % Latest Ref Range: 0 - 2 % 0  1   Lymphocytes Relative Latest Ref Range: 14 - 44 % 7 (L)  9 (L)   Monocytes Relative Latest Ref Range: 4 - 12 % 8  2 (L)   Eosinophils Latest Ref Range: 0 - 6 % 0  0   Basophils Relative Latest Ref Range: 0 - 1 % 0  0   Immature Grans Absolute Latest Ref Range: 0 00 - 0 20 Thousand/uL 0 05  0 18   Absolute Neutrophils Latest Ref Range: 1 85 - 7 62 Thousands/µL 12 34 (H)  12 74 (H)   Lymphocytes Absolute Latest Ref Range: 0 60 - 4 47 Thousands/µL 1 00  1 23   Absolute Monocytes Latest Ref Range: 0 17 - 1 22 Thousand/µL 1 13  0 29   Absolute Eosinophils Latest Ref Range: 0 00 - 0 61 Thousand/µL 0 03  0 00   Basophils Absolute Latest Ref Range: 0 00 - 0 10 Thousands/µL 0 05  0 02   Sed Rate Latest Ref Range: 2 - 10 mm/hour   25 (H)   Protime Latest Ref Range: 11 6 - 14 5 seconds  14 3    INR Latest Ref Range: 0 84 - 1 19   1 08    PTT Latest Ref Range: 23 - 37 seconds  28    C-REACTIVE PROTEIN Latest Ref Range: <3 0 mg/L   110 6 (H)       Assessment/Plan:    1  Exacerbation of Crohn's disease with radiographic evidence of fistulization with blind-ending tracts originating from the inflamed terminal ileum; all occurring in the context of non adherence with maintenance therapy for the last several years  Appears to be clinically improving with introduction of IV steroids and Flagyl; there was concern for poststenotic dilatation/possible early small-bowel obstruction on imaging from yesterday, but patient has been without vomiting and is clinically without any signs of bowel obstruction at this time, he is also passing flatus    - will advance to clear liquid diet at this time  - continue IV steroids;  Solu-Medrol 20 mg q 8 hours  - continue IV Flagyl, 500 mg q 8 hours  - monitor abdominal exam, temperature, white blood cell count  - follow-up on remaining inflammatory markers  - if patient develops diarrhea, check stool enteric panel and C diff  - also follow-up on pre biologic treatment laboratory testing (TPMT enzyme activity/genotyping, QuantiFERON TB testing, chronic hepatitis panel), he will need to be started on a biologic in the near future, possibly Entyvio

## 2020-03-24 NOTE — ASSESSMENT & PLAN NOTE
Possible bacterial enteritis  Patient noted to have significant inflammatory changes in the terminal ileum  Patient also noted to have leukocytosis on admission and again on repeat lab work but this is possibly steroid related    Patient refused lab work today  Stool for bacterial panel and Clostridium difficile toxin were ordered but patient with no diarrhea and therefore cancelled  Patient on Flagyl 500 milligram IV q 8 hours but no Flagyl on discharge as per GI

## 2020-03-24 NOTE — ASSESSMENT & PLAN NOTE
Patient presented to the emergency room with significant abdominal pain  Patient has history of Crohn's disease and has been off treatment for the past 2 years  CT scan of the abdomen pelvis in the she showed marked thickening with luminal narrowing of the terminal ileum with poststenotic dilatation and is not stricture with poststenotic dilated loop involving distal ileum with marked adjacent inflammatory changes and reactive lymphadenopathy  Multiple fluid tracks suspicious for blind ending fistulous tracts  Air pocket within the right upper quadrant adjacent to the liver  Follow-up with GI after discharge  Patient on Solu-Medrol 20 milligram IV q 8 hours along with Flagyl 500 milligram IV q 8 hours  While here  Prolonged prednisone taper as per GI - 40 mg daily x2 weeks and then decrease by 5 mg weekly  Patient previously was on 5 ASA with suboptimal response  Patient had adverse effects with Remicade  Patient had nonadherence with 6 MP and Humira    ESR 25 and   GI ordered pre biologic treatment labs-TB QuantiFERON testing, TPMT testing, chronic hepatitis panel and will need COVID 19 testing which will be ordered on follow-up with GI

## 2020-03-24 NOTE — PROGRESS NOTES
[facility] Internal [facility] Progress Note  Patient: Breanne Lamar 22 y o  male   MRN: [id number]  PCP: Mary Upton  Unit/Bed#: 2 [place] [address] Encounter: [id number]  Date Of Visit: 03/24/20    Problem List:    Principal Problem:    Exacerbation of Crohn's disease ([facility] )  Active Problems:    Asthma    Enteritis      Assessment & Plan:    * Exacerbation of Crohn's disease ([facility] )  Assessment & Plan  Patient presented to the emergency room with significant abdominal pain  Patient has history of Crohn's disease and has been off treatment for the past 2 years  CT scan of the abdomen pelvis in the she showed marked thickening with luminal narrowing of the terminal ileum with poststenotic dilatation and is not stricture with poststenotic dilated loop involving distal ileum with marked adjacent inflammatory changes and reactive lymphadenopathy  Multiple fluid tracks suspicious for blind ending fistulous tracts  Air pocket within the right upper quadrant adjacent to the liver  Appreciate GI input  Patient on Solu-Medrol 20 milligram IV q 8 hours along with Flagyl 500 milligram IV q 8 hours  Started on clears  Patient previously was on 5 ASA with suboptimal response  Patient had adverse effects with Remicade  Patient had nonadherence with 6 MP and Humira  ESR 25 and   GI ordered pre biologic treatment labs-TB QuantiFERON testing, TPMT testing, chronic hepatitis panel      Enteritis  Assessment & Plan  Patient noted to have significant inflammatory changes in the terminal ileum  Patient also noted to have leukocytosis on admission and again today but this is possibly steroid related  Stool for bacterial panel and Clostridium difficile toxin were ordered but patient with no diarrhea  Patient on Flagyl 500 milligram IV q 8 hours    Asthma  Assessment & Plan  No evidence of exacerbation    Patient does not take any medications at home        VTE Pharmacologic Prophylaxis:   Pharmacologic: Enoxaparin (Lovenox)  Mechanical VTE Prophylaxis in Place: Yes    Patient Centered Rounds: I have performed bedside rounds with nursing staff today  Discussions with Specialists or Other Care Team Provider: yes - GI    Education and Discussions with Family / Patient: Yes    Time Spent for Care: 30 minutes  More than 50% of total time spent on counseling and coordination of care as described above  Current Length of Stay: 1 day(s)    Current Patient Status: Inpatient   Certification Statement: The patient will continue to require additional inpatient hospital stay due to Crohn's exacerbation requiring IV steroids    Discharge Plan: home    Code Status: Level 1 - Full Code      Subjective:   Patient denies any abdominal pain, nausea, vomiting, diarrhea today    Objective:     Vitals:   Temp (24hrs), Av 2 °F (36 8 °C), Min:97 5 °F (36 4 °C), Max:98 9 °F (37 2 °C)    Temp:  [97 5 °F (36 4 °C)-98 9 °F (37 2 °C)] 98 4 °F (36 9 °C)  HR:  [] 94  Resp:  [15-20] 19  BP: ()/(57-70) 111/66  SpO2:  [97 %-100 %] 100 %  Body mass index is 21 3 kg/m²  Input and Output Summary (last 24 hours): Intake/Output Summary (Last 24 hours) at 3/24/2020 1124  Last data filed at 3/24/2020 0916  Gross per 24 hour   Intake 3300 ml   Output --   Net 3300 ml       Physical Exam:     Physical Exam   Constitutional: He is oriented to person, place, and time  No distress  HENT:   Head: Normocephalic and atraumatic  Eyes: EOM are normal  Right eye exhibits no discharge  Left eye exhibits no discharge  No scleral icterus  Cardiovascular: Normal rate and regular rhythm  Pulmonary/Chest: Effort normal and breath sounds normal  No respiratory distress  He has no wheezes  He has no rales  Abdominal: Soft  Bowel sounds are normal  He exhibits no distension  There is no tenderness  Musculoskeletal: He exhibits no edema  Neurological: He is alert and oriented to person, place, and time  No cranial nerve deficit     Skin: He is not diaphoretic  Psychiatric: He has a normal mood and affect  Additional Data:     Labs:    Results from last 7 days   Lab Units 03/24/20  0549   WBC Thousand/uL 14 46*   HEMOGLOBIN g/dL 12 5   HEMATOCRIT % 39 2   PLATELETS Thousands/uL 385   NEUTROS PCT % 88*   LYMPHS PCT % 9*   MONOS PCT % 2*   EOS PCT % 0     Results from last 7 days   Lab Units 03/24/20  0549 03/23/20  1031   POTASSIUM mmol/L 4 1 3 9   CHLORIDE mmol/L 101 100   CO2 mmol/L 26 29   BUN mg/dL 19 13   CREATININE mg/dL 1 10 1 09   CALCIUM mg/dL 9 2 9 0   ALK PHOS U/L  --  111   ALT U/L  --  11*   AST U/L  --  11     Results from last 7 days   Lab Units 03/23/20  1035   INR  1 08       * I Have Reviewed All Lab Data Listed Above  * Additional Pertinent [facility] Tests [facility]ed[facility]      Imaging:  Imaging Reports Reviewed Today Include: CT abd/pelvis  Imaging Personally Reviewed by Myself Includes:  N/A    Recent Cultures (last 7 days):           Last 24 Hours Medication List:     Current Facility-Administered Medications:  enoxaparin 40 mg Subcutaneous Daily Kitty Verdin MD    famotidine 20 mg Oral BID Kitty Verdin MD    Or        famotidine 20 mg Intravenous BID Greg Frey MD    ketorolac 15 mg Intravenous Q6H PRN Kitty Verdin MD    methylPREDNISolone sodium succinate 20 mg Intravenous Q8H [facility] Kitty Verdin MD    metroNIDAZOLE 500 mg Intravenous Q8H Kitty Verdin MD Last Rate: 500 mg (03/24/20 0600)   ondansetron 4 mg Intravenous Q6H PRN Kitty Verdin MD    sodium chloride 125 mL/hr Intravenous Continuous Kitty Verdin MD Last Rate: 125 mL/hr (03/24/20 0916)          Today, Patient Was Seen By: Dami Robles DO    ** Please Note: "This note has been constructed using a voice recognition system  Therefore there may be syntax, spelling, and/or grammatical errors   Please call if you have any questions  "**

## 2020-03-24 NOTE — UTILIZATION REVIEW
Initial Clinical Review    Admission: Date/Time/Statement: Admission Orders (From admission, onward)     Ordered        03/23/20 1302  Inpatient Admission  Once                   Orders Placed This Encounter   Procedures    Inpatient Admission     Standing Status:   Standing     Number of Occurrences:   1     Order Specific Question:   Admitting Physician     Answer:   Harshad Gardner     Order Specific Question:   Level of Care     Answer:   Med Surg [16]     Order Specific Question:   Estimated length of stay     Answer:   More than 2 Midnights     Order Specific Question:   Certification     Answer:   I certify that inpatient services are medically necessary for this patient for a duration of greater than two midnights  See H&P and MD Progress Notes for additional information about the patient's course of treatment  ED Arrival Information     Expected Arrival Acuity Means of Arrival Escorted By Service Admission Type    - 3/23/2020 09:44 Urgent Walk-In Family Member Hospitalist Urgent    Arrival Complaint    Abdominal Pain        Chief Complaint   Patient presents with    Abdominal Pain     Pt history of Crohn's, c/o abdominal pain and nausea  Assessment/Plan:   22 yom ambulatory to er from home c/o several days of diffuse abdominal pain associated with nausea and vomiting  Patient states he has had a gastroenteritis approximately 2 weeks ago where he had vomiting and diarrhea but had resolved for over a week but the belly pain continued  Hx asthma,  crohn's (dx at 16), no tx for years  Presents febrile & tachycardic with above s/s & generalized tenderness  Admission work-up showing leukocytosis, elevated crp & sed rate, CT with signs active inflammatory bowel disease with suspicion for fistulas  Admitted to inpatient status for exacerbation of Crohn's disease  Placed on contact & hand hygiene isolation, started on iv steroids & ivabt with ivf, GI consulted     ED Triage Vitals   Temperature Pulse Respirations Blood Pressure SpO2   03/23/20 0955 03/23/20 0955 03/23/20 0955 03/23/20 0955 03/23/20 0955   99 3 °F (37 4 °C) 100 16 147/86 100 %      Temp Source Heart Rate Source Patient Position - Orthostatic VS BP Location FiO2 (%)   03/23/20 0955 03/23/20 1047 03/23/20 0955 03/23/20 0955 --   Tympanic Monitor Lying Right arm       Pain Score       03/23/20 0955       7        Wt Readings from Last 1 Encounters:   03/23/20 58 1 kg (128 lb)     Additional Vital Signs:   03/23/20 13:59:14  98 9 °F (37 2 °C)  100  15  96/60  72  99 %  None (Room air)  Lying   03/23/20 1247  --  90  16  106/57  73  100 %  None (Room air)  Lying   03/23/20 1047  --  104  16  129/74  --  100 %  None (Room air)  Lying   03/23/20 1015  --  --  --  --  --  --  None (Room air)  --   03/23/20 0955  99 3 °F (37 4 °C)  100  16  147/86  --  100 %  None (Room air)  Lying   Pertinent Labs/Diagnostic Test Results:   Results from last 7 days   Lab Units 03/24/20  0549 03/23/20  1031   WBC Thousand/uL 14 46* 14 60*   HEMOGLOBIN g/dL 12 5 15 0   HEMATOCRIT % 39 2 45 6   PLATELETS Thousands/uL 385 440*   NEUTROS ABS Thousands/µL 12 74* 12 34*     Results from last 7 days   Lab Units 03/24/20  0549 03/23/20  1031   SODIUM mmol/L 136 138   POTASSIUM mmol/L 4 1 3 9   CHLORIDE mmol/L 101 100   CO2 mmol/L 26 29   ANION GAP mmol/L 9 9   BUN mg/dL 19 13   CREATININE mg/dL 1 10 1 09   EGFR ml/min/1 73sq m 109 110   CALCIUM mg/dL 9 2 9 0     Results from last 7 days   Lab Units 03/23/20  1031   AST U/L 11   ALT U/L 11*   ALK PHOS U/L 111   TOTAL PROTEIN g/dL 8 1   ALBUMIN g/dL 3 3*   TOTAL BILIRUBIN mg/dL 0 40     Results from last 7 days   Lab Units 03/24/20  0549 03/23/20  1031   GLUCOSE RANDOM mg/dL 119 105     Results from last 7 days   Lab Units 03/23/20  1035   PROTIME seconds 14 3   INR  1 08   PTT seconds 28     Results from last 7 days   Lab Units 03/23/20  1031   LIPASE u/L 112     Results from last 7 days   Lab Units 03/24/20  0549   CRP mg/L 110 6*   SED RATE mm/hour 25*     3/23  Ct a/p=1  Marked thickening and luminal narrowing of the terminal ileum with poststenotic dilatation and additional stricture with poststenotic dilated loop involving distal ileum as described above with marked adjacent inflammatory changes and reactive lymphadenopathy, consistent with active inflammatory bowel disease  Multiple fluid tracks with rim enhancement are present, suspicious for blind-ending fistulous tracts  An air pocket within the right upper quadrant adjacent to the liver is present and   difficult to determine if this represents an air-filled tract extending from the inflamed ileum or portion of ascending colon  2   Normal appendix identified      ED Treatment:   Medication Administration from 03/23/2020 0944 to 03/23/2020 1350       Date/Time Order Dose Route Action     03/23/2020 1043 ondansetron (ZOFRAN) injection 4 mg 4 mg Intravenous Given     03/23/2020 1012 sodium chloride 0 9 % bolus 1,000 mL 1,000 mL Intravenous New Bag     03/23/2020 1045 ketorolac (TORADOL) injection 30 mg 30 mg Intravenous Given     03/23/2020 1128 iohexol (OMNIPAQUE) 350 MG/ML injection (MULTI-DOSE) 100 mL 100 mL Intravenous Given     03/23/2020 1314 metroNIDAZOLE (FLAGYL) IVPB (premix) 500 mg 500 mg Intravenous New Bag     03/23/2020 1314 methylPREDNISolone sodium succinate (Solu-MEDROL) injection 60 mg 60 mg Intravenous Given        Past Medical History:   Diagnosis Date    Asthma     Crohn disease ([facility] )      Admitting Diagnosis: Abdominal pain [R10 9]  Exacerbation of Crohn's disease ([facility] ) [K50 90]  Age/Sex: 22 y o  male  Admission Orders:  scd  Consult GI  Contact & hand hygiene isolation  Scheduled Medications:  enoxaparin 40 mg Subcutaneous Daily   famotidine 20 mg Oral BID   Or      famotidine 20 mg Intravenous BID   methylPREDNISolone sodium succinate 20 mg Intravenous Q8H [address]   metroNIDAZOLE 500 mg Intravenous Q8H     Continuous IV Infusions:  sodium chloride 125 mL/hr Intravenous Continuous     PRN Meds:  ketorolac 15 mg Intravenous Q6H PRN   ondansetron 4 mg Intravenous Q6H PRN     Network Utilization Review Department  [email or web address]  ATTENTION: Please call with any questions or concerns to [phone] and carefully listen to the prompts so that you are directed to the right person  All voicemails are confidential   Alva Allen all requests for admission clinical reviews, approved or denied determinations and any other requests to dedicated fax number below belonging to the campus where the patient is receiving treatment   List of dedicated fax numbers for the Facilities:  [address] DENIALS (Administrative/Medical Necessity) [phone]   [address] (Maternity/NICU/Pediatrics) [phone] 5400 [facility] [phone]   Mahendra Olvera [phone]   Vin Grit [phone]   [address]  [phone]   1205 [facility] 1525 [facility] [phone]   [facility]  [phone]   [address] [facility] [address]  2401 [facility] [address] [facility] [phone]

## 2020-03-25 VITALS
RESPIRATION RATE: 16 BRPM | OXYGEN SATURATION: 100 % | DIASTOLIC BLOOD PRESSURE: 73 MMHG | HEART RATE: 65 BPM | BODY MASS INDEX: 21.33 KG/M2 | SYSTOLIC BLOOD PRESSURE: 122 MMHG | WEIGHT: 128 LBS | HEIGHT: 65 IN | TEMPERATURE: 97.8 F

## 2020-03-25 PROBLEM — R65.10 SIRS (SYSTEMIC INFLAMMATORY RESPONSE SYNDROME) (HCC): Status: RESOLVED | Noted: 2020-03-25 | Resolved: 2020-03-25

## 2020-03-25 PROBLEM — K52.9 ENTERITIS: Status: RESOLVED | Noted: 2020-03-23 | Resolved: 2020-03-25

## 2020-03-25 PROBLEM — R65.10 SIRS (SYSTEMIC INFLAMMATORY RESPONSE SYNDROME) (HCC): Status: ACTIVE | Noted: 2020-03-25

## 2020-03-25 LAB
GAMMA INTERFERON BACKGROUND BLD IA-ACNC: 0.02 IU/ML
M TB IFN-G BLD-IMP: ABNORMAL
M TB IFN-G CD4+ BCKGRND COR BLD-ACNC: -0.01 IU/ML
M TB IFN-G CD4+ BCKGRND COR BLD-ACNC: 0 IU/ML
MITOGEN IGNF BCKGRD COR BLD-ACNC: 0.31 IU/ML

## 2020-03-25 PROCEDURE — 99232 SBSQ HOSP IP/OBS MODERATE 35: CPT | Performed by: INTERNAL MEDICINE

## 2020-03-25 PROCEDURE — 99239 HOSP IP/OBS DSCHRG MGMT >30: CPT | Performed by: FAMILY MEDICINE

## 2020-03-25 RX ORDER — PREDNISONE 1 MG/1
TABLET ORAL
Qty: 308 TABLET | Refills: 0 | Status: SHIPPED | OUTPATIENT
Start: 2020-03-25 | End: 2020-03-26 | Stop reason: SDUPTHER

## 2020-03-25 RX ADMIN — METHYLPREDNISOLONE SODIUM SUCCINATE 20 MG: 40 INJECTION, POWDER, FOR SOLUTION INTRAMUSCULAR; INTRAVENOUS at 13:26

## 2020-03-25 RX ADMIN — SODIUM CHLORIDE 125 ML/HR: 0.9 INJECTION, SOLUTION INTRAVENOUS at 11:19

## 2020-03-25 RX ADMIN — SODIUM CHLORIDE 125 ML/HR: 0.9 INJECTION, SOLUTION INTRAVENOUS at 02:26

## 2020-03-25 RX ADMIN — METHYLPREDNISOLONE SODIUM SUCCINATE 20 MG: 40 INJECTION, POWDER, FOR SOLUTION INTRAMUSCULAR; INTRAVENOUS at 05:05

## 2020-03-25 RX ADMIN — FAMOTIDINE 20 MG: 20 TABLET, FILM COATED ORAL at 10:04

## 2020-03-25 RX ADMIN — METRONIDAZOLE 500 MG: 500 INJECTION, SOLUTION INTRAVENOUS at 13:27

## 2020-03-25 RX ADMIN — METRONIDAZOLE 500 MG: 500 INJECTION, SOLUTION INTRAVENOUS at 05:03

## 2020-03-25 NOTE — PLAN OF CARE
Problem: Nutrition/Hydration-ADULT  Goal: Nutrient/Hydration intake appropriate for improving, restoring or maintaining nutritional needs  Description  Monitor and assess patient's nutrition/hydration status for malnutrition  Collaborate with interdisciplinary team and initiate plan and interventions as ordered  Monitor patient's weight and dietary intake as ordered or per policy  Utilize nutrition screening tool and intervene as necessary  Determine patient's food preferences and provide high-protein, high-caloric foods as appropriate       INTERVENTIONS:  - Monitor oral intake, urinary output, labs, and treatment plans  - Assess nutrition and hydration status and recommend course of action  - Evaluate amount of meals eaten  - Allow adequate time for meals  - Assess need for intravenous fluids  - Provide specific nutrition/hydration education as appropriate     Outcome: Progressing     Problem: GASTROINTESTINAL - ADULT  Goal: Minimal or absence of nausea and/or vomiting  Description  INTERVENTIONS:  - Administer IV fluids if ordered to ensure adequate hydration  - Administer ordered antiemetic medications as needed  - Provide nonpharmacologic comfort measures as appropriate  - Advance diet as tolerated, if ordered  - Consider nutrition services referral to assist patient with adequate nutrition and appropriate food choices    Outcome: Progressing  Goal: Maintains or returns to baseline bowel function  Description  INTERVENTIONS:  - Assess bowel function  - Encourage oral fluids to ensure adequate hydration  - Administer IV fluids if ordered to ensure adequate hydration  - Administer ordered medications as needed  - Encourage mobilization and activity  - Consider nutritional services referral to assist patient with adequate nutrition and appropriate food choices  Outcome: Progressing  Goal: Maintains adequate nutritional intake  Description  INTERVENTIONS:  - Monitor percentage of each meal consumed  - Identify factors contributing to decreased intake, treat as appropriate  - Assist with meals as needed  - Monitor I&O, weight, and lab values if indicated  - Obtain nutrition services referral as needed  Outcome: Progressing     Problem: PAIN - ADULT  Goal: Verbalizes/displays adequate comfort level or baseline comfort level  Description  Interventions:  - Encourage patient to monitor pain and request assistance  - Assess pain using appropriate pain scale  - Administer analgesics based on type and severity of pain and evaluate response  - Implement non-pharmacological measures as appropriate and evaluate response  - Notify physician/advanced practitioner if interventions unsuccessful or patient reports new pain   Outcome: Progressing     Problem: Potential for Falls  Goal: Patient will remain free of falls  Description  INTERVENTIONS:  - Assess patient frequently for physical needs  -  Identify cognitive and physical deficits and behaviors that affect risk of falls    -  [place] fall precautions as indicated by assessment   - Educate patient/family on patient safety including physical limitations  - Instruct patient to call for assistance with activity based on assessment  - Modify environment to reduce risk of injury  - Consider OT/PT consult to assist with strengthening/mobility  Outcome: Progressing

## 2020-03-25 NOTE — PLAN OF CARE
Problem: Nutrition/Hydration-ADULT  Goal: Nutrient/Hydration intake appropriate for improving, restoring or maintaining nutritional needs  Description  Monitor and assess patient's nutrition/hydration status for malnutrition  Collaborate with interdisciplinary team and initiate plan and interventions as ordered  Monitor patient's weight and dietary intake as ordered or per policy  Utilize nutrition screening tool and intervene as necessary  Determine patient's food preferences and provide high-protein, high-caloric foods as appropriate       INTERVENTIONS:  - Monitor oral intake, urinary output, labs, and treatment plans  - Assess nutrition and hydration status and recommend course of action  - Evaluate amount of meals eaten  - Allow adequate time for meals  - Assess need for intravenous fluids  - Provide specific nutrition/hydration education as appropriate     Outcome: Progressing     Problem: GASTROINTESTINAL - ADULT  Goal: Minimal or absence of nausea and/or vomiting  Description  INTERVENTIONS:  - Administer IV fluids if ordered to ensure adequate hydration  - Administer ordered antiemetic medications as needed  - Provide nonpharmacologic comfort measures as appropriate  - Advance diet as tolerated, if ordered  - Consider nutrition services referral to assist patient with adequate nutrition and appropriate food choices    Outcome: Progressing  Goal: Maintains or returns to baseline bowel function  Description  INTERVENTIONS:  - Assess bowel function  - Encourage oral fluids to ensure adequate hydration  - Administer IV fluids if ordered to ensure adequate hydration  - Administer ordered medications as needed  - Encourage mobilization and activity  - Consider nutritional services referral to assist patient with adequate nutrition and appropriate food choices  Outcome: Progressing  Goal: Maintains adequate nutritional intake  Description  INTERVENTIONS:  - Monitor percentage of each meal consumed  - Identify factors contributing to decreased intake, treat as appropriate  - Assist with meals as needed  - Monitor I&O, weight, and lab values if indicated  - Obtain nutrition services referral as needed  Outcome: Progressing     Problem: PAIN - ADULT  Goal: Verbalizes/displays adequate comfort level or baseline comfort level  Description  Interventions:  - Encourage patient to monitor pain and request assistance  - Assess pain using appropriate pain scale  - Administer analgesics based on type and severity of pain and evaluate response  - Implement non-pharmacological measures as appropriate and evaluate response  - Notify physician/advanced practitioner if interventions unsuccessful or patient reports new pain   Outcome: Progressing     Problem: Potential for Falls  Goal: Patient will remain free of falls  Description  INTERVENTIONS:  - Assess patient frequently for physical needs  -  Identify cognitive and physical deficits and behaviors that affect risk of falls    -  [place] fall precautions as indicated by assessment   - Educate patient/family on patient safety including physical limitations  - Instruct patient to call for assistance with activity based on assessment  - Modify environment to reduce risk of injury  - Consider OT/PT consult to assist with strengthening/mobility  Outcome: Progressing

## 2020-03-25 NOTE — PROGRESS NOTES
Progress Note - Lina Mohan 22 y o  male MRN: [id number]    Unit/Bed#: 2 [place] [address] Encounter: [id number]        Subjective:   Patient reports continuing to feel well, still has not had any bowel movements but is passing gas  Denies any nausea or vomiting, denies having any abdominal pain at all  He apparently refused lab work this morning    Objective:     Vitals: Blood pressure 109/68, pulse 75, temperature 98 2 °F (36 8 °C), resp  rate 17, height 5' 5" (1 651 m), weight 58 1 kg (128 lb), SpO2 100 %  ,Body mass index is 21 3 kg/m²  Intake/Output Summary (Last 24 hours) at 3/25/2020 1001  Last data filed at 3/25/2020 0533  Gross per 24 hour   Intake 1508 75 ml   Output --   Net 1508 75 ml       Physical Exam (limited exam carried out due to COVID-19 outbreak):   General appearance: alert, appears stated age and cooperative  Lungs:  No labored breathing or tachypnea, no audible wheezing or stridor    Abdomen:  Nondistended  Extremities:  No rashes or cyanosis    Invasive Devices     Peripheral Intravenous Line            Peripheral IV 03/23/20 Right Arm 1 day                Lab, Imaging and other studies: I have personally reviewed pertinent reports      Admission on 03/23/2020   Component Date Value    WBC 03/23/2020 14 60*    RBC 03/23/2020 5 16     Hemoglobin 03/23/2020 15 0     Hematocrit 03/23/2020 45 6     MCV 03/23/2020 88     MCH 03/23/2020 29 1     MCHC 03/23/2020 32 9     RDW 03/23/2020 13 5     MPV 03/23/2020 9 0     Platelets 77/07/2518 440*    nRBC 03/23/2020 0     Neutrophils Relative 03/23/2020 85*    Immat GRANS % 03/23/2020 0     Lymphocytes Relative 03/23/2020 7*    Monocytes Relative 03/23/2020 8     Eosinophils Relative 03/23/2020 0     Basophils Relative 03/23/2020 0     Neutrophils Absolute 03/23/2020 12 34*    Immature Grans Absolute 03/23/2020 0 05     Lymphocytes Absolute 03/23/2020 1 00     Monocytes Absolute 03/23/2020 1 13     Eosinophils Absolute 03/23/2020 0 03     Basophils Absolute 03/23/2020 0 05     Sodium 03/23/2020 138     Potassium 03/23/2020 3 9     Chloride 03/23/2020 100     CO2 03/23/2020 29     ANION GAP 03/23/2020 9     BUN 03/23/2020 13     Creatinine 03/23/2020 1 09     Glucose 03/23/2020 105     Calcium 03/23/2020 9 0     AST 03/23/2020 11     ALT 03/23/2020 11*    Alkaline Phosphatase 03/23/2020 111     Total Protein 03/23/2020 8 1     Albumin 03/23/2020 3 3*    Total Bilirubin 03/23/2020 0 40     eGFR 03/23/2020 110     Lipase 03/23/2020 112     Extra Tube 03/23/2020 Hold for add-ons       Protime 03/23/2020 14 3     INR 03/23/2020 1 08     PTT 03/23/2020 28     Sodium 03/24/2020 136     Potassium 03/24/2020 4 1     Chloride 03/24/2020 101     CO2 03/24/2020 26     ANION GAP 03/24/2020 9     BUN 03/24/2020 19     Creatinine 03/24/2020 1 10     Glucose 03/24/2020 119     Calcium 03/24/2020 9 2     eGFR 03/24/2020 109     WBC 03/24/2020 14 46*    RBC 03/24/2020 4 39     Hemoglobin 03/24/2020 12 5     Hematocrit 03/24/2020 39 2     MCV 03/24/2020 89     MCH 03/24/2020 28 5     MCHC 03/24/2020 31 9     RDW 03/24/2020 13 3     MPV 03/24/2020 9 3     Platelets 96/59/5163 385     nRBC 03/24/2020 0     Neutrophils Relative 03/24/2020 88*    Immat GRANS % 03/24/2020 1     Lymphocytes Relative 03/24/2020 9*    Monocytes Relative 03/24/2020 2*    Eosinophils Relative 03/24/2020 0     Basophils Relative 03/24/2020 0     Neutrophils Absolute 03/24/2020 12 74*    Immature Grans Absolute 03/24/2020 0 18     Lymphocytes Absolute 03/24/2020 1 23     Monocytes Absolute 03/24/2020 0 29     Eosinophils Absolute 03/24/2020 0 00     Basophils Absolute 03/24/2020 0 02     CRP 03/24/2020 110 6*    Hepatitis B Surface Ag 03/24/2020 Non-reactive     Hepatitis C Ab 03/24/2020 Non-reactive     Hep B C IgM 03/24/2020 Non-reactive     Hep B Core Total Ab 03/24/2020 Non-reactive     Sed Rate 03/24/2020 25*     Results for Carlee Hernandez (MRN [id number]) as of 3/25/2020 10:01   Ref   Range 3/23/2020 10:31 3/23/2020 10:35 3/24/2020 05:49   Sodium Latest Ref Range: 136 - 145 mmol/L 138  136   Potassium Latest Ref Range: 3 5 - 5 3 mmol/L 3 9  4 1   Chloride Latest Ref Range: 100 - 108 mmol/L 100  101   CO2 Latest Ref Range: 21 - 32 mmol/L 29  26   Anion Gap Latest Ref Range: 4 - 13 mmol/L 9  9   BUN Latest Ref Range: 5 - 25 mg/dL 13  19   Creatinine Latest Ref Range: 0 60 - 1 30 mg/dL 1 09  1 10   Glucose, Random Latest Ref Range: 65 - 140 mg/dL 105  119   Calcium Latest Ref Range: 8 3 - 10 1 mg/dL 9 0  9 2   AST Latest Ref Range: 5 - 45 U/L 11     ALT Latest Ref Range: 12 - 78 U/L 11 (L)     Alkaline Phosphatase Latest Ref Range: 46 - 116 U/L 111     Total Protein Latest Ref Range: 6 4 - 8 2 g/dL 8 1     Albumin Latest Ref Range: 3 5 - 5 0 g/dL 3 3 (L)     TOTAL BILIRUBIN Latest Ref Range: 0 20 - 1 00 mg/dL 0 40     eGFR Latest Units: ml/min/1 73sq m 110  109   Lipase Latest Ref Range: 73 - 393 u/L 112     WBC Latest Ref Range: 4 31 - 10 16 Thousand/uL 14 60 (H)  14 46 (H)   Red Blood Cell Count Latest Ref Range: 3 88 - 5 62 Million/uL 5 16  4 39   Hemoglobin Latest Ref Range: 12 0 - 17 0 g/dL 15 0  12 5   HCT Latest Ref Range: 36 5 - 49 3 % 45 6  39 2   MCV Latest Ref Range: 82 - 98 fL 88  89   MCH Latest Ref Range: 26 8 - 34 3 pg 29 1  28 5   MCHC Latest Ref Range: 31 4 - 37 4 g/dL 32 9  31 9   RDW Latest Ref Range: 11 6 - 15 1 % 13 5  13 3   Platelet Count Latest Ref Range: 149 - 390 Thousands/uL 440 (H)  385   MPV Latest Ref Range: 8 9 - 12 7 fL 9 0  9 3   nRBC Latest Units: /100 WBCs 0  0   Neutrophils % Latest Ref Range: 43 - 75 % 85 (H)  88 (H)   Immat GRANS % Latest Ref Range: 0 - 2 % 0  1   Lymphocytes Relative Latest Ref Range: 14 - 44 % 7 (L)  9 (L)   Monocytes Relative Latest Ref Range: 4 - 12 % 8  2 (L)   Eosinophils Latest Ref Range: 0 - 6 % 0  0   Basophils Relative Latest Ref Range: 0 - 1 % 0  0   Immature Grans Absolute Latest Ref Range: 0 00 - 0 20 Thousand/uL 0 05  0 18   Absolute Neutrophils Latest Ref Range: 1 85 - 7 62 Thousands/µL 12 34 (H)  12 74 (H)   Lymphocytes Absolute Latest Ref Range: 0 60 - 4 47 Thousands/µL 1 00  1 23   Absolute Monocytes Latest Ref Range: 0 17 - 1 22 Thousand/µL 1 13  0 29   Absolute Eosinophils Latest Ref Range: 0 00 - 0 61 Thousand/µL 0 03  0 00   Basophils Absolute Latest Ref Range: 0 00 - 0 10 Thousands/µL 0 05  0 02   Sed Rate Latest Ref Range: 2 - 10 mm/hour   25 (H)   Protime Latest Ref Range: 11 6 - 14 5 seconds  14 3    INR Latest Ref Range: 0 84 - 1 19   1 08    PTT Latest Ref Range: 23 - 37 seconds  28    HEPATITIS B SURFACE ANTIGEN Latest Ref Range: Non-reactive, NonReactive - Confirmed    Non-reactive   HEPATITIS B CORE TOTAL ANTIBODY Latest Ref Range: Non-reactive    Non-reactive   HEPATITIS B CORE IGM ANTIBODY Latest Ref Range: Non-reactive    Non-reactive   HEPATITIS C ANTIBODY Latest Ref Range: Non-reactive    Non-reactive   C-REACTIVE PROTEIN Latest Ref Range: <3 0 mg/L   110 6 (H)       Assessment/Plan:    1  Fistulizing Crohn's disease with flare up in the setting of medication nonadherence for the last several years    Clinically appears improving with introduction of IV steroids and Flagyl    - follow-up on pre biologic testing including TPMT enzyme activity level and genotyping, QuantiFERON TB testing, his chronic hepatitis panel has come back negative  - he will also require COVID-19 testing prior to starting biologics, ideally timed more closely with starting infusion  - continue IV Flagyl while hospitalized  - the transition to oral steroids in the form of prednisone 40 mg once daily, would recommend slow taper of 5 mg each week  - office follow-up within the next few weeks to discuss about starting biologics, most likely Entyvio  - patient was advised regarding the importance of maintenance therapy in the prevention of developing further complications of Crohn's disease

## 2020-03-25 NOTE — DISCHARGE SUMMARY
Discharge Summary - [facility] Internal Medicine    Patient Information: Filiberto Cabral 22 y o  male MRN: [id number]  Unit/Bed#: [place] Encounter: [id number]    Discharging Physician / Practitioner: Ame Stevenson DO  PCP: Julia Samayoa  Admission Date: 3/23/2020  Discharge Date: 03/25/20    Reason for Admission: Abdominal Pain (Pt history of Crohn's, c/o abdominal pain and nausea )      Discharge Diagnoses:     Principal Problem:    Exacerbation of Crohn's disease ([facility] )  Active Problems:    Asthma  Resolved Problems:    Enteritis    SIRS (systemic inflammatory response syndrome) ([facility] )        * Exacerbation of Crohn's disease ([place] [address] )  Assessment & Plan  Patient presented to the emergency room with significant abdominal pain  Patient has history of Crohn's disease and has been off treatment for the past 2 years  CT scan of the abdomen pelvis in the she showed marked thickening with luminal narrowing of the terminal ileum with poststenotic dilatation and is not stricture with poststenotic dilated loop involving distal ileum with marked adjacent inflammatory changes and reactive lymphadenopathy  Multiple fluid tracks suspicious for blind ending fistulous tracts  Air pocket within the right upper quadrant adjacent to the liver  Follow-up with GI after discharge  Patient on Solu-Medrol 20 milligram IV q 8 hours along with Flagyl 500 milligram IV q 8 hours  While here  Prolonged prednisone taper as per GI - 40 mg daily x2 weeks and then decrease by 5 mg weekly  Patient previously was on 5 ASA with suboptimal response  Patient had adverse effects with Remicade  Patient had nonadherence with 6 MP and Humira  ESR 25 and   GI ordered pre biologic treatment labs-TB QuantiFERON testing, TPMT testing, chronic hepatitis panel and will need COVID 19 testing which will be ordered on follow-up with GI      Asthma  Assessment & Plan  No evidence of exacerbation    Patient does not take any medications at home     SIRS (systemic inflammatory response syndrome) (HCC)-resolved as of 3/25/2020  Assessment & Plan  POA as noted by leukocytosis, tachycardia due to Crohn's exacerbation    Enteritis-resolved as of 3/25/2020  Assessment & Plan  Possible bacterial enteritis  Patient noted to have significant inflammatory changes in the terminal ileum  Patient also noted to have leukocytosis on admission and again on repeat lab work but this is possibly steroid related  Patient refused lab work today  Stool for bacterial panel and Clostridium difficile toxin were ordered but patient with no diarrhea and therefore cancelled  Patient on Flagyl 500 milligram IV q 8 hours but no Flagyl on discharge as per GI      Consultations During Hospital Stay:  [id number] [facility]    Procedures Performed:     · none    Significant Findings:     · Refer to hospital course and above listed diagnosis related plan for details    Imaging while in hospital:    Ct Abdomen Pelvis With Contrast    Result Date: 3/23/2020  Narrative: CT ABDOMEN AND PELVIS WITH IV CONTRAST INDICATION:   History of Crohn's  COMPARISON:  None  TECHNIQUE:  CT examination of the abdomen and pelvis was performed  Axial, sagittal, and coronal 2D reformatted images were created from the source data and submitted for interpretation  Radiation dose length product (DLP) for this visit:  405 56 mGy-cm   This examination, like all CT scans performed in the [facility], was performed utilizing techniques to minimize radiation dose exposure, including the use of iterative  reconstruction and automated exposure control  IV Contrast:  100 mL of iohexol (OMNIPAQUE) Enteric Contrast:  Enteric contrast was not administered  FINDINGS: ABDOMEN LOWER CHEST:  No clinically significant abnormality identified in the visualized lower chest  LIVER/BILIARY TREE:  Unremarkable  GALLBLADDER:  No calcified gallstones  No pericholecystic inflammatory change   SPLEEN: Unremarkable  PANCREAS:  Unremarkable  ADRENAL GLANDS:  Unremarkable  KIDNEYS/URETERS:  Unremarkable  No hydronephrosis  STOMACH AND BOWEL:  There is marked thickening and luminal narrowing of the terminal ileum with focal upstream dilated segment of distal ileum, consistent with poststenotic dilatation (series 601 images 41 through 49)  There is a 2nd area of stenosis within the distal ileum (series 601 image 41), also with poststenotic dilated portion of distal ileum  There are several fluid tracks with rim enhancement that extend from the diseased terminal ileum, suspicious for blind ending fistulous tracts (series 601 images 41, 42, and 45)  There is an air-filled focus within the right upper quadrant adjacent to the liver (series 2 image 34 and series 601 image 52), which is difficult to discern if this is portion of ascending colon or an air-filled tract extending from the inflamed terminal ileum  There is marked inflammatory changes within the right lower quadrant with multiple enlarged lymph nodes present  APPENDIX:  A normal appendix was visualized (series 601 images 61 through 75)  ABDOMINOPELVIC CAVITY:  See above  Multiple enlarged lymph nodes within the right lower quadrant are present  For reference, an enlarged lymph node measures 1 2 cm in short axis (series 2 image 41)  VESSELS:  Unremarkable for patient's age  PELVIS REPRODUCTIVE ORGANS:  Unremarkable for patient's age  URINARY BLADDER:  Unremarkable  ABDOMINAL WALL/INGUINAL REGIONS:  Unremarkable  OSSEOUS STRUCTURES:  No acute fracture or destructive osseous lesion  Impression: 1  Marked thickening and luminal narrowing of the terminal ileum with poststenotic dilatation and additional stricture with poststenotic dilated loop involving distal ileum as described above with marked adjacent inflammatory changes and reactive lymphadenopathy, consistent with active inflammatory bowel disease    Multiple fluid tracks with rim enhancement are present, suspicious for blind-ending fistulous tracts  An air pocket within the right upper quadrant adjacent to the liver is present and  difficult to determine if this represents an air-filled tract extending from the inflamed ileum or portion of ascending colon  2   Normal appendix identified  The study was marked in [facility] for immediate notification  Workstation performed: [id number]       Incidental Findings:   · none    Test Results Pending at Discharge (will require follow up):   · As per After Visit Summary     Outpatient Tests Requested:  · none    Complications:  Refer to hospital course and above listed diagnosis related plan, if any    Hospital Course:     Diandra Chavez is a 22 y o  male patient who originally presented to the hospital on 3/23/2020 due to abdominal pain x2 weeks  Patient with history of Crohn's disease  Patient also reported nausea, vomiting, diarrhea which resolved in about 2-3 days but abdominal pain persisted  Patient was admitted for Crohn's disease exacerbation and seen by GI  He was started on IV Solu-Medrol along with IV Flagyl  Abdominal pain resolved by day of discharge  patient was tolerating his diet  He was cleared by GI prior to discharge    Please see above list of diagnoses and related plan for additional information  Condition at Discharge: stable     Discharge Day Visit / Exam:     Subjective:  Patient refusing blood work today  Had 1 bowel movement which was not diarrhea  Tolerating diet  Denies any abdominal pain    Vitals: Blood Pressure: 122/73 (03/25/20 1529)  Pulse: 65 (03/25/20 1529)  Temperature: 97 8 °F (36 6 °C) (03/25/20 1529)  Temp Source: Oral (03/25/20 1003)  Respirations: 16 (03/25/20 1529)  Height: 5' 5" (165 1 cm) (03/23/20 0955)  Weight - Scale: 58 1 kg (128 lb) (03/23/20 0955)  SpO2: 100 % (03/25/20 1529)  Exam:   Physical Exam   Constitutional: He is oriented to person, place, and time  No distress     HENT:   Head: Normocephalic and atraumatic  Eyes: EOM are normal  Right eye exhibits no discharge  Left eye exhibits no discharge  No scleral icterus  Cardiovascular: Normal rate and regular rhythm  Pulmonary/Chest: Effort normal and breath sounds normal  No respiratory distress  He has no wheezes  He has no rales  Abdominal: Soft  Bowel sounds are normal  He exhibits no distension  There is no tenderness  Musculoskeletal: He exhibits no edema  Neurological: He is alert and oriented to person, place, and time  No cranial nerve deficit  Skin: He is not diaphoretic  Discharge instructions/Information to patient and family:(Discharge Medications and Follow up):   See after visit summary for information provided to patient and family  Provisions for Follow-Up Care:  See after visit summary for information related to follow-up care and any pertinent home health orders  Disposition: Home    Planned Readmission:  No     Discharge Statement:  I spent 35 minutes discharging the patient  This time was spent on the day of discharge  I had direct contact with the patient on the day of discharge  Greater than 50% of the total time was spent examining patient, answering all patient questions, arranging and discussing plan of care with patient as well as directly providing post-discharge instructions  Additional time then spent on discharge activities  Discharge Medications:  See after visit summary for reconciled discharge medications provided to patient and family  ** Please Note: "This note has been constructed using a voice recognition system  Therefore there may be syntax, spelling, and/or grammatical errors   Please call if you have any questions  "**

## 2020-03-25 NOTE — NURSING NOTE
Attempted to draw the pt's blood when early AM medications were given  Pt refused  Pt stated he informed the MD yesterday that he did not want to be stuck again  Pt stated he had multiple sticks yesterday and now has swelling around some of the sites  Last night pt was given multiple hot packs to help with some of this swelling  Reji Thibodeaux NP on call overnight was made aware  Will continue to monitor the pt

## 2020-03-25 NOTE — PLAN OF CARE
Problem: Nutrition/Hydration-ADULT  Goal: Nutrient/Hydration intake appropriate for improving, restoring or maintaining nutritional needs  Description  Monitor and assess patient's nutrition/hydration status for malnutrition  Collaborate with interdisciplinary team and initiate plan and interventions as ordered  Monitor patient's weight and dietary intake as ordered or per policy  Utilize nutrition screening tool and intervene as necessary  Determine patient's food preferences and provide high-protein, high-caloric foods as appropriate       INTERVENTIONS:  - Monitor oral intake, urinary output, labs, and treatment plans  - Assess nutrition and hydration status and recommend course of action  - Evaluate amount of meals eaten  - Allow adequate time for meals  - Assess need for intravenous fluids  - Provide specific nutrition/hydration education as appropriate     3/25/2020 1818 by Nicky Craft RN  Outcome: Completed  3/25/2020 1235 by Nicky Craft RN  Outcome: Progressing     Problem: GASTROINTESTINAL - ADULT  Goal: Minimal or absence of nausea and/or vomiting  Description  INTERVENTIONS:  - Administer IV fluids if ordered to ensure adequate hydration  - Administer ordered antiemetic medications as needed  - Provide nonpharmacologic comfort measures as appropriate  - Advance diet as tolerated, if ordered  - Consider nutrition services referral to assist patient with adequate nutrition and appropriate food choices    3/25/2020 1818 by Nicky Craft RN  Outcome: Completed  3/25/2020 1235 by Nicky Craft RN  Outcome: Progressing  Goal: Maintains or returns to baseline bowel function  Description  INTERVENTIONS:  - Assess bowel function  - Encourage oral fluids to ensure adequate hydration  - Administer IV fluids if ordered to ensure adequate hydration  - Administer ordered medications as needed  - Encourage mobilization and activity  - Consider nutritional services referral to assist patient with adequate nutrition and appropriate food choices  3/25/2020 1818 by Jono Max RN  Outcome: Completed  3/25/2020 1235 by Jono Max RN  Outcome: Progressing  Goal: Maintains adequate nutritional intake  Description  INTERVENTIONS:  - Monitor percentage of each meal consumed  - Identify factors contributing to decreased intake, treat as appropriate  - Assist with meals as needed  - Monitor I&O, weight, and lab values if indicated  - Obtain nutrition services referral as needed  3/25/2020 1818 by Jono Max RN  Outcome: Completed  3/25/2020 1235 by Jono Max RN  Outcome: Progressing     Problem: PAIN - ADULT  Goal: Verbalizes/displays adequate comfort level or baseline comfort level  Description  Interventions:  - Encourage patient to monitor pain and request assistance  - Assess pain using appropriate pain scale  - Administer analgesics based on type and severity of pain and evaluate response  - Implement non-pharmacological measures as appropriate and evaluate response  - Notify physician/advanced practitioner if interventions unsuccessful or patient reports new pain   3/25/2020 1818 by Jono Max RN  Outcome: Completed  3/25/2020 1235 by Jono Max RN  Outcome: Progressing     Problem: Potential for Falls  Goal: Patient will remain free of falls  Description  INTERVENTIONS:  - Assess patient frequently for physical needs  -  Identify cognitive and physical deficits and behaviors that affect risk of falls    -  [place] fall precautions as indicated by assessment   - Educate patient/family on patient safety including physical limitations  - Instruct patient to call for assistance with activity based on assessment  - Modify environment to reduce risk of injury  - Consider OT/PT consult to assist with strengthening/mobility  3/25/2020 1818 by Jono Max RN  Outcome: Completed  3/25/2020 1235 by Jono Max RN  Outcome: Progressing

## 2020-03-26 RX ORDER — PREDNISONE 1 MG/1
TABLET ORAL
Qty: 308 TABLET | Refills: 0 | Status: SHIPPED | OUTPATIENT
Start: 2020-03-26 | End: 2020-07-25 | Stop reason: HOSPADM

## 2020-03-26 NOTE — UTILIZATION REVIEW
Notification of Discharge  This is a Notification of Discharge from our facility [address]  Please be advised that this patient has been discharge from our facility  Below you will find the admission and discharge date and time including the patients disposition  PRESENTATION DATE: 3/23/2020  9:50 AM  OBS ADMISSION DATE:   IP ADMISSION DATE: 3/23/20 1302   DISCHARGE DATE: 3/25/2020  7:00 PM  DISPOSITION: Home/Self Care Home/Self Care   Admission Orders listed below:  Admission Orders (From admission, onward)     Ordered        03/23/20 1302  Inpatient Admission  Once                   Please contact the UR Department if additional information is required to close this patient's authorization/case  [facility] Utilization Review Department  Main: [phone] x carefully listen to the prompts  All voicemails are confidential   [email or web address]  Send all requests for admission clinical reviews, approved or denied determinations and any other requests to dedicated fax number below belonging to the campus where the patient is receiving treatment   List of dedicated fax numbers:  [address] DENIALS (Administrative/Medical Necessity) [phone]   [address] (Maternity/NICU/Pediatrics) [phone]   Valentin Courser [phone]   Mahendra Sole [phone]   Vin Grit [phone]   [address] [phone]   [facility]  [phone]   [address] [facility]  [address] [facility] [place] [address] [facility] [phone]

## 2020-03-27 ENCOUNTER — TELEPHONE (OUTPATIENT)
Dept: GASTROENTEROLOGY | Facility: CLINIC | Age: 25
End: 2020-03-27

## 2020-03-30 LAB
DIAGNOSTIC IMP SPEC-IMP: NORMAL
LABORATORY COMMENT REPORT: NORMAL
TPMT GENE MUT ANL BLD/T: NORMAL
TPMT GENE MUT ANL BLD/T: NORMAL

## 2020-03-31 LAB
REF LAB TEST METHOD: NORMAL
TEST INTERPRETATION: NORMAL
TPMT RBC-CCNC: 15.5 UNITS/ML RBC

## 2020-04-22 ENCOUNTER — TELEPHONE (OUTPATIENT)
Dept: GASTROENTEROLOGY | Facility: AMBULARY SURGERY CENTER | Age: 25
End: 2020-04-22

## 2020-05-01 ENCOUNTER — TELEPHONE (OUTPATIENT)
Dept: GASTROENTEROLOGY | Facility: AMBULARY SURGERY CENTER | Age: 25
End: 2020-05-01

## 2020-06-25 ENCOUNTER — TRANSCRIBE ORDERS (OUTPATIENT)
Dept: ADMINISTRATIVE | Facility: HOSPITAL | Age: 25
End: 2020-06-25

## 2020-06-25 DIAGNOSIS — R11.10 VOMITING, INTRACTABILITY OF VOMITING NOT SPECIFIED, PRESENCE OF NAUSEA NOT SPECIFIED, UNSPECIFIED VOMITING TYPE: ICD-10-CM

## 2020-06-25 DIAGNOSIS — K50.90 CROHN'S DISEASE WITHOUT COMPLICATION, UNSPECIFIED GASTROINTESTINAL TRACT LOCATION (HCC): Primary | ICD-10-CM

## 2020-07-01 ENCOUNTER — HOSPITAL ENCOUNTER (OUTPATIENT)
Dept: RADIOLOGY | Facility: HOSPITAL | Age: 25
Discharge: HOME | End: 2020-07-01
Attending: INTERNAL MEDICINE
Payer: COMMERCIAL

## 2020-07-01 DIAGNOSIS — R11.10 VOMITING, INTRACTABILITY OF VOMITING NOT SPECIFIED, PRESENCE OF NAUSEA NOT SPECIFIED, UNSPECIFIED VOMITING TYPE: ICD-10-CM

## 2020-07-01 DIAGNOSIS — K50.90 CROHN'S DISEASE WITHOUT COMPLICATION, UNSPECIFIED GASTROINTESTINAL TRACT LOCATION (HCC): ICD-10-CM

## 2020-07-01 PROCEDURE — 74177 CT ABD & PELVIS W/CONTRAST: CPT

## 2020-07-01 RX ADMIN — IOHEXOL 100 ML: 350 INJECTION, SOLUTION INTRAVENOUS at 09:20

## 2020-07-14 ENCOUNTER — HOSPITAL ENCOUNTER (OUTPATIENT)
Dept: INFUSION CENTER | Facility: HOSPITAL | Age: 25
Discharge: HOME | End: 2020-07-14
Payer: COMMERCIAL

## 2020-07-14 VITALS
OXYGEN SATURATION: 99 % | TEMPERATURE: 97.8 F | HEART RATE: 100 BPM | DIASTOLIC BLOOD PRESSURE: 75 MMHG | SYSTOLIC BLOOD PRESSURE: 122 MMHG | RESPIRATION RATE: 16 BRPM

## 2020-07-14 PROCEDURE — 96413 CHEMO IV INFUSION 1 HR: CPT

## 2020-07-14 RX ORDER — OMEPRAZOLE 40 MG/1
40 CAPSULE, DELAYED RELEASE ORAL DAILY
COMMUNITY

## 2020-07-14 RX ADMIN — VEDOLIZUMAB 300 MG: 300 INJECTION, POWDER, LYOPHILIZED, FOR SOLUTION INTRAVENOUS at 09:19

## 2020-07-20 ENCOUNTER — TRANSCRIBE ORDERS (OUTPATIENT)
Dept: ADMINISTRATIVE | Facility: HOSPITAL | Age: 25
End: 2020-07-20

## 2020-07-20 DIAGNOSIS — K50.919 CROHN'S DISEASE WITH COMPLICATION, UNSPECIFIED GASTROINTESTINAL TRACT LOCATION (HCC): Primary | ICD-10-CM

## 2020-07-22 ENCOUNTER — APPOINTMENT (EMERGENCY)
Dept: RADIOLOGY | Facility: HOSPITAL | Age: 25
DRG: 871 | End: 2020-07-22
Payer: COMMERCIAL

## 2020-07-22 ENCOUNTER — HOSPITAL ENCOUNTER (INPATIENT)
Facility: HOSPITAL | Age: 25
LOS: 3 days | Discharge: HOME | DRG: 871 | End: 2020-07-25
Attending: EMERGENCY MEDICINE | Admitting: INTERNAL MEDICINE
Payer: COMMERCIAL

## 2020-07-22 DIAGNOSIS — K50.90 EXACERBATION OF CROHN'S DISEASE (HCC): ICD-10-CM

## 2020-07-22 DIAGNOSIS — K50.10 CROHN'S COLITIS (HCC): ICD-10-CM

## 2020-07-22 DIAGNOSIS — R10.9 ABDOMINAL PAIN: ICD-10-CM

## 2020-07-22 DIAGNOSIS — A41.9 SEPSIS (HCC): Primary | ICD-10-CM

## 2020-07-22 DIAGNOSIS — E43 SEVERE PROTEIN-CALORIE MALNUTRITION (HCC): ICD-10-CM

## 2020-07-22 LAB
ALBUMIN SERPL BCP-MCNC: 3.3 G/DL (ref 3.5–5)
ALP SERPL-CCNC: 105 U/L (ref 46–116)
ALT SERPL W P-5'-P-CCNC: 13 U/L (ref 12–78)
ANION GAP SERPL CALCULATED.3IONS-SCNC: 7 MMOL/L (ref 4–13)
APTT PPP: 30 SECONDS (ref 23–37)
AST SERPL W P-5'-P-CCNC: 15 U/L (ref 5–45)
BASOPHILS # BLD AUTO: 0.07 THOUSANDS/ΜL (ref 0–0.1)
BASOPHILS NFR BLD AUTO: 1 % (ref 0–1)
BILIRUB SERPL-MCNC: 0.6 MG/DL (ref 0.2–1)
BUN SERPL-MCNC: 16 MG/DL (ref 5–25)
CALCIUM SERPL-MCNC: 9.6 MG/DL (ref 8.3–10.1)
CHLORIDE SERPL-SCNC: 96 MMOL/L (ref 100–108)
CO2 SERPL-SCNC: 30 MMOL/L (ref 21–32)
CREAT SERPL-MCNC: 1.24 MG/DL (ref 0.6–1.3)
EOSINOPHIL # BLD AUTO: 0.02 THOUSAND/ΜL (ref 0–0.61)
EOSINOPHIL NFR BLD AUTO: 0 % (ref 0–6)
ERYTHROCYTE [DISTWIDTH] IN BLOOD BY AUTOMATED COUNT: 13.8 % (ref 11.6–15.1)
GFR SERPL CREATININE-BSD FRML MDRD: 93 ML/MIN/1.73SQ M
GLUCOSE SERPL-MCNC: 104 MG/DL (ref 65–140)
HCT VFR BLD AUTO: 50.2 % (ref 36.5–49.3)
HGB BLD-MCNC: 16.4 G/DL (ref 12–17)
IMM GRANULOCYTES # BLD AUTO: 0.07 THOUSAND/UL (ref 0–0.2)
IMM GRANULOCYTES NFR BLD AUTO: 1 % (ref 0–2)
INR PPP: 1.13 (ref 0.84–1.19)
LACTATE SERPL-SCNC: 1.4 MMOL/L (ref 0.5–2)
LIPASE SERPL-CCNC: 159 U/L (ref 73–393)
LYMPHOCYTES # BLD AUTO: 2.05 THOUSANDS/ΜL (ref 0.6–4.47)
LYMPHOCYTES NFR BLD AUTO: 18 % (ref 14–44)
MAGNESIUM SERPL-MCNC: 2.2 MG/DL (ref 1.6–2.6)
MCH RBC QN AUTO: 28.7 PG (ref 26.8–34.3)
MCHC RBC AUTO-ENTMCNC: 32.7 G/DL (ref 31.4–37.4)
MCV RBC AUTO: 88 FL (ref 82–98)
MONOCYTES # BLD AUTO: 1.13 THOUSAND/ΜL (ref 0.17–1.22)
MONOCYTES NFR BLD AUTO: 10 % (ref 4–12)
NEUTROPHILS # BLD AUTO: 8.37 THOUSANDS/ΜL (ref 1.85–7.62)
NEUTS SEG NFR BLD AUTO: 70 % (ref 43–75)
NRBC BLD AUTO-RTO: 0 /100 WBCS
PLATELET # BLD AUTO: 480 THOUSANDS/UL (ref 149–390)
PMV BLD AUTO: 8.6 FL (ref 8.9–12.7)
POTASSIUM SERPL-SCNC: 4.6 MMOL/L (ref 3.5–5.3)
PROT SERPL-MCNC: 8.5 G/DL (ref 6.4–8.2)
PROTHROMBIN TIME: 14.4 SECONDS (ref 11.6–14.5)
RBC # BLD AUTO: 5.71 MILLION/UL (ref 3.88–5.62)
SODIUM SERPL-SCNC: 133 MMOL/L (ref 136–145)
WBC # BLD AUTO: 11.71 THOUSAND/UL (ref 4.31–10.16)

## 2020-07-22 PROCEDURE — 96361 HYDRATE IV INFUSION ADD-ON: CPT

## 2020-07-22 PROCEDURE — 36415 COLL VENOUS BLD VENIPUNCTURE: CPT | Performed by: EMERGENCY MEDICINE

## 2020-07-22 PROCEDURE — 99291 CRITICAL CARE FIRST HOUR: CPT

## 2020-07-22 PROCEDURE — 99223 1ST HOSP IP/OBS HIGH 75: CPT | Performed by: PHYSICIAN ASSISTANT

## 2020-07-22 PROCEDURE — 85610 PROTHROMBIN TIME: CPT | Performed by: EMERGENCY MEDICINE

## 2020-07-22 PROCEDURE — 99291 CRITICAL CARE FIRST HOUR: CPT | Performed by: EMERGENCY MEDICINE

## 2020-07-22 PROCEDURE — 83735 ASSAY OF MAGNESIUM: CPT | Performed by: EMERGENCY MEDICINE

## 2020-07-22 PROCEDURE — 83605 ASSAY OF LACTIC ACID: CPT | Performed by: EMERGENCY MEDICINE

## 2020-07-22 PROCEDURE — 85025 COMPLETE CBC W/AUTO DIFF WBC: CPT | Performed by: EMERGENCY MEDICINE

## 2020-07-22 PROCEDURE — 83690 ASSAY OF LIPASE: CPT | Performed by: EMERGENCY MEDICINE

## 2020-07-22 PROCEDURE — 85730 THROMBOPLASTIN TIME PARTIAL: CPT | Performed by: EMERGENCY MEDICINE

## 2020-07-22 PROCEDURE — 74177 CT ABD & PELVIS W/CONTRAST: CPT

## 2020-07-22 PROCEDURE — 87040 BLOOD CULTURE FOR BACTERIA: CPT | Performed by: EMERGENCY MEDICINE

## 2020-07-22 PROCEDURE — 96374 THER/PROPH/DIAG INJ IV PUSH: CPT

## 2020-07-22 PROCEDURE — 80053 COMPREHEN METABOLIC PANEL: CPT | Performed by: EMERGENCY MEDICINE

## 2020-07-22 PROCEDURE — 96375 TX/PRO/DX INJ NEW DRUG ADDON: CPT

## 2020-07-22 RX ORDER — MORPHINE SULFATE 4 MG/ML
4 INJECTION, SOLUTION INTRAMUSCULAR; INTRAVENOUS ONCE
Status: COMPLETED | OUTPATIENT
Start: 2020-07-22 | End: 2020-07-22

## 2020-07-22 RX ORDER — METHYLPREDNISOLONE SODIUM SUCCINATE 125 MG/2ML
60 INJECTION, POWDER, LYOPHILIZED, FOR SOLUTION INTRAMUSCULAR; INTRAVENOUS ONCE
Status: COMPLETED | OUTPATIENT
Start: 2020-07-23 | End: 2020-07-23

## 2020-07-22 RX ORDER — ONDANSETRON 2 MG/ML
4 INJECTION INTRAMUSCULAR; INTRAVENOUS ONCE
Status: COMPLETED | OUTPATIENT
Start: 2020-07-22 | End: 2020-07-22

## 2020-07-22 RX ADMIN — MORPHINE SULFATE 4 MG: 4 INJECTION INTRAVENOUS at 21:48

## 2020-07-22 RX ADMIN — SODIUM CHLORIDE 1000 ML: 0.9 INJECTION, SOLUTION INTRAVENOUS at 20:59

## 2020-07-22 RX ADMIN — METHYLPREDNISOLONE SODIUM SUCCINATE 60 MG: 125 INJECTION, POWDER, FOR SOLUTION INTRAMUSCULAR; INTRAVENOUS at 23:57

## 2020-07-22 RX ADMIN — ONDANSETRON 4 MG: 2 INJECTION INTRAMUSCULAR; INTRAVENOUS at 21:47

## 2020-07-22 RX ADMIN — PIPERACILLIN AND TAZOBACTAM 3.38 G: 3; .375 INJECTION, POWDER, LYOPHILIZED, FOR SOLUTION INTRAVENOUS at 23:51

## 2020-07-22 RX ADMIN — IOHEXOL 100 ML: 350 INJECTION, SOLUTION INTRAVENOUS at 22:57

## 2020-07-22 RX ADMIN — ACETAMINOPHEN 975 MG: 325 SUPPOSITORY RECTAL at 23:57

## 2020-07-23 PROBLEM — A41.9 SEPSIS WITHOUT ACUTE ORGAN DYSFUNCTION (HCC): Status: ACTIVE | Noted: 2020-07-23

## 2020-07-23 PROBLEM — E43 SEVERE PROTEIN-CALORIE MALNUTRITION (HCC): Status: ACTIVE | Noted: 2020-07-23

## 2020-07-23 PROBLEM — E87.1 HYPONATREMIA: Status: ACTIVE | Noted: 2020-07-23

## 2020-07-23 LAB
ANION GAP SERPL CALCULATED.3IONS-SCNC: 7 MMOL/L (ref 4–13)
BASOPHILS # BLD AUTO: 0.03 THOUSANDS/ΜL (ref 0–0.1)
BASOPHILS NFR BLD AUTO: 0 % (ref 0–1)
BILIRUB UR QL STRIP: NEGATIVE
BUN SERPL-MCNC: 14 MG/DL (ref 5–25)
CALCIUM SERPL-MCNC: 8.8 MG/DL (ref 8.3–10.1)
CHLORIDE SERPL-SCNC: 97 MMOL/L (ref 100–108)
CLARITY UR: CLEAR
CO2 SERPL-SCNC: 27 MMOL/L (ref 21–32)
COLOR UR: YELLOW
CREAT SERPL-MCNC: 1.4 MG/DL (ref 0.6–1.3)
CRP SERPL QL: 93.8 MG/L
EOSINOPHIL # BLD AUTO: 0 THOUSAND/ΜL (ref 0–0.61)
EOSINOPHIL NFR BLD AUTO: 0 % (ref 0–6)
ERYTHROCYTE [DISTWIDTH] IN BLOOD BY AUTOMATED COUNT: 13.6 % (ref 11.6–15.1)
GFR SERPL CREATININE-BSD FRML MDRD: 81 ML/MIN/1.73SQ M
GLUCOSE SERPL-MCNC: 142 MG/DL (ref 65–140)
GLUCOSE UR STRIP-MCNC: NEGATIVE MG/DL
HCT VFR BLD AUTO: 42.2 % (ref 36.5–49.3)
HGB BLD-MCNC: 14 G/DL (ref 12–17)
HGB UR QL STRIP.AUTO: NEGATIVE
IMM GRANULOCYTES # BLD AUTO: 0.12 THOUSAND/UL (ref 0–0.2)
IMM GRANULOCYTES NFR BLD AUTO: 1 % (ref 0–2)
KETONES UR STRIP-MCNC: NEGATIVE MG/DL
LEUKOCYTE ESTERASE UR QL STRIP: NEGATIVE
LYMPHOCYTES # BLD AUTO: 0.76 THOUSANDS/ΜL (ref 0.6–4.47)
LYMPHOCYTES NFR BLD AUTO: 5 % (ref 14–44)
MCH RBC QN AUTO: 29 PG (ref 26.8–34.3)
MCHC RBC AUTO-ENTMCNC: 33.2 G/DL (ref 31.4–37.4)
MCV RBC AUTO: 87 FL (ref 82–98)
MONOCYTES # BLD AUTO: 0.19 THOUSAND/ΜL (ref 0.17–1.22)
MONOCYTES NFR BLD AUTO: 1 % (ref 4–12)
NEUTROPHILS # BLD AUTO: 13.38 THOUSANDS/ΜL (ref 1.85–7.62)
NEUTS SEG NFR BLD AUTO: 93 % (ref 43–75)
NITRITE UR QL STRIP: NEGATIVE
NRBC BLD AUTO-RTO: 0 /100 WBCS
PH UR STRIP.AUTO: 6.5 [PH]
PLATELET # BLD AUTO: 419 THOUSANDS/UL (ref 149–390)
PMV BLD AUTO: 9.1 FL (ref 8.9–12.7)
POTASSIUM SERPL-SCNC: 4.8 MMOL/L (ref 3.5–5.3)
PROT UR STRIP-MCNC: NEGATIVE MG/DL
RBC # BLD AUTO: 4.83 MILLION/UL (ref 3.88–5.62)
SARS-COV-2 RNA RESP QL NAA+PROBE: NEGATIVE
SODIUM SERPL-SCNC: 131 MMOL/L (ref 136–145)
SP GR UR STRIP.AUTO: 1.01 (ref 1–1.03)
UROBILINOGEN UR QL STRIP.AUTO: 0.2 E.U./DL
WBC # BLD AUTO: 14.48 THOUSAND/UL (ref 4.31–10.16)

## 2020-07-23 PROCEDURE — 85025 COMPLETE CBC W/AUTO DIFF WBC: CPT | Performed by: PHYSICIAN ASSISTANT

## 2020-07-23 PROCEDURE — 80048 BASIC METABOLIC PNL TOTAL CA: CPT | Performed by: PHYSICIAN ASSISTANT

## 2020-07-23 PROCEDURE — 99254 IP/OBS CNSLTJ NEW/EST MOD 60: CPT | Performed by: PHYSICIAN ASSISTANT

## 2020-07-23 PROCEDURE — 81003 URINALYSIS AUTO W/O SCOPE: CPT | Performed by: EMERGENCY MEDICINE

## 2020-07-23 PROCEDURE — 87635 SARS-COV-2 COVID-19 AMP PRB: CPT | Performed by: EMERGENCY MEDICINE

## 2020-07-23 PROCEDURE — 99233 SBSQ HOSP IP/OBS HIGH 50: CPT | Performed by: INTERNAL MEDICINE

## 2020-07-23 PROCEDURE — 86140 C-REACTIVE PROTEIN: CPT | Performed by: INTERNAL MEDICINE

## 2020-07-23 PROCEDURE — 99223 1ST HOSP IP/OBS HIGH 75: CPT | Performed by: INTERNAL MEDICINE

## 2020-07-23 RX ORDER — SODIUM CHLORIDE 9 MG/ML
100 INJECTION, SOLUTION INTRAVENOUS CONTINUOUS
Status: DISCONTINUED | OUTPATIENT
Start: 2020-07-23 | End: 2020-07-25 | Stop reason: HOSPADM

## 2020-07-23 RX ORDER — PANTOPRAZOLE SODIUM 40 MG/1
40 TABLET, DELAYED RELEASE ORAL
Status: DISCONTINUED | OUTPATIENT
Start: 2020-07-23 | End: 2020-07-25 | Stop reason: HOSPADM

## 2020-07-23 RX ORDER — METHYLPREDNISOLONE SODIUM SUCCINATE 40 MG/ML
20 INJECTION, POWDER, LYOPHILIZED, FOR SOLUTION INTRAMUSCULAR; INTRAVENOUS EVERY 8 HOURS SCHEDULED
Status: DISCONTINUED | OUTPATIENT
Start: 2020-07-23 | End: 2020-07-24

## 2020-07-23 RX ORDER — CIPROFLOXACIN 2 MG/ML
400 INJECTION, SOLUTION INTRAVENOUS EVERY 12 HOURS
Status: DISCONTINUED | OUTPATIENT
Start: 2020-07-23 | End: 2020-07-25 | Stop reason: HOSPADM

## 2020-07-23 RX ORDER — ACETAMINOPHEN 325 MG/1
650 TABLET ORAL EVERY 6 HOURS PRN
Status: DISCONTINUED | OUTPATIENT
Start: 2020-07-23 | End: 2020-07-25 | Stop reason: HOSPADM

## 2020-07-23 RX ORDER — ONDANSETRON 2 MG/ML
4 INJECTION INTRAMUSCULAR; INTRAVENOUS EVERY 6 HOURS PRN
Status: DISCONTINUED | OUTPATIENT
Start: 2020-07-23 | End: 2020-07-25 | Stop reason: HOSPADM

## 2020-07-23 RX ORDER — PREDNISONE 10 MG/1
30 TABLET ORAL DAILY
COMMUNITY
End: 2020-07-25 | Stop reason: HOSPADM

## 2020-07-23 RX ADMIN — PANTOPRAZOLE SODIUM 40 MG: 40 TABLET, DELAYED RELEASE ORAL at 05:02

## 2020-07-23 RX ADMIN — CIPROFLOXACIN 400 MG: 2 INJECTION, SOLUTION INTRAVENOUS at 14:14

## 2020-07-23 RX ADMIN — METHYLPREDNISOLONE SODIUM SUCCINATE 20 MG: 40 INJECTION, POWDER, FOR SOLUTION INTRAMUSCULAR; INTRAVENOUS at 09:03

## 2020-07-23 RX ADMIN — METRONIDAZOLE 500 MG: 500 INJECTION, SOLUTION INTRAVENOUS at 17:46

## 2020-07-23 RX ADMIN — METRONIDAZOLE 500 MG: 500 INJECTION, SOLUTION INTRAVENOUS at 10:16

## 2020-07-23 RX ADMIN — METRONIDAZOLE 500 MG: 500 INJECTION, SOLUTION INTRAVENOUS at 02:49

## 2020-07-23 RX ADMIN — CIPROFLOXACIN 400 MG: 2 INJECTION, SOLUTION INTRAVENOUS at 03:35

## 2020-07-23 RX ADMIN — SODIUM CHLORIDE 100 ML/HR: 0.9 INJECTION, SOLUTION INTRAVENOUS at 02:49

## 2020-07-23 RX ADMIN — METHYLPREDNISOLONE SODIUM SUCCINATE 20 MG: 40 INJECTION, POWDER, FOR SOLUTION INTRAMUSCULAR; INTRAVENOUS at 17:45

## 2020-07-23 RX ADMIN — SODIUM CHLORIDE 100 ML/HR: 0.9 INJECTION, SOLUTION INTRAVENOUS at 14:13

## 2020-07-23 NOTE — H&P
[address] Internal Medicine  H&P- Margie Kimbrough 1995, 26 y o  male MRN: [id number]  Unit/Bed#: [address] Encounter: [id number]  Primary Care Provider: Lexi Cruz   Date and time admitted to hospital: 7/22/2020  8:42 PM    * Exacerbation of Crohn's disease [facility])  Assessment & Plan  Patient presented to the ED today with worsening generalized abdominal pain for the past 2 days  He has a history of Crohn's disease and has had multiple recent flares in the past few months  Patient underwent EGD/colonoscopy on 7/17 at [facility] in [facility]  Requested medical records  - CT in the ED showed moderate wall thickening and hyperemia involving multiple loops of distal ileum as well as the cecum, ascending colon, and proximal transverse colon, with inflamed small bowel loops and right lower quadrant and surrounding mesenteric fat stranding and right hemiabdomen and prominent mesenteric lymph nodes; no discrete evidence of bowel obstruction, no abscess  - Patient was given Solu-Medrol 60 mg IV, will continue Solu-Medrol dosing  - Received Zosyn in the ED, continue with Cipro and Flagyl  - patient states he was started on Entyvio approximately 1 week ago  - Appreciate GI and general surgery consults    Sepsis without acute organ dysfunction ([address] )  Assessment & Plan  POA, as evidenced by tachycardia, tachypnea, temp of 102 4°F, WBC 11 with concern for intraabdominal infection in the setting of Crohn's disease  WBC may also be mildly elevated in part due to prolonged steroid taper   - lactic acid 1 4  - blood cultures pending  - follow up procal  - Continue Abx as above      Severe protein-calorie malnutrition ([address] )  Assessment & Plan   In the setting of Crohn's disease, patient has had multiple flares in the recent months and reports overall poor PO intake, poor appetite  BMI 16 97  Appreciate GI, nutrition consults  Body mass index is 16 97 kg/m²         Hyponatremia  Assessment & Plan  Sodium of 133 on admission  Patient reports poor PO intake for some time, but certainly worse in the past 2 days since his abdominal pain, nausea/poor appetite started  Support with IVFs while NPO  Repeat BMP in a m  VTE Prophylaxis: Pharmacologic VTE Prophylaxis contraindicated due to Low risk  / reason for no mechanical VTE prophylaxis Low risk   Code Status:  Level 1  POLST: POLST is not applicable to this patient  Discussion with family:  Offered but patient declined    Anticipated Length of Stay:  Patient will be admitted on an Inpatient basis with an anticipated length of stay of  > 2 midnights  Justification for Hospital Stay:  Crohn's disease with acute flare    Total Time for Visit, including Counseling / Coordination of Care: 45 minutes  Greater than 50% of this total time spent on direct patient counseling and coordination of care  Chief Complaint:   Abdominal pain    History of Present Illness:    Laura Alvarez is a 26 y o  male who presents with PMH of Crohn's disease, childhood asthma who presented to the ED today with a complaint of generalized abdominal pain  He states that the pain started yesterday and ranges from a 5 to an 8 or 9/10  He states that the pain is generalized, and the pain is not localized any specific area  He also complains of nausea and overall poor PO intake, poor appetite  He denies any episodes of vomiting, no diarrhea or bloody stools, and last bowel movement was yesterday which was normal  He states that in the recent months he has had multiple flares, and overall isn't eating as much as he used to because he frequently feels nauseous when he has Crohn's flares  He denies any fevers but presented to the ED with a temp of 100 6° F --> 102 4° F  Last Friday, 7/17, patient underwent EGD and colonoscopy at [facility], and was recently started on Entyvio, receiving his 1st dose on Tuesday, 7/14      Review of Systems:    Review of Systems   Constitutional: Positive for appetite change  Negative for chills, fatigue and fever  HENT: Negative for congestion, rhinorrhea and sore throat  Eyes: Negative for visual disturbance  Respiratory: Negative for cough, shortness of breath and wheezing  Cardiovascular: Negative for chest pain, palpitations and leg swelling  Gastrointestinal: Positive for abdominal pain and nausea  Negative for abdominal distention, blood in stool, constipation, diarrhea and vomiting  Genitourinary: Negative for dysuria, frequency and hematuria  Musculoskeletal: Negative for back pain, gait problem and myalgias  Skin: Negative for rash and wound  Neurological: Negative for dizziness, weakness, light-headedness and headaches  Past Medical and Surgical History:     Past Medical History:   Diagnosis Date    Asthma     Crohn disease ([facility] [address] )        History reviewed  No pertinent surgical history  Meds/Allergies:    Prior to Admission medications    Medication Sig Start Date End Date Taking? Authorizing Provider   omeprazole (PriLOSEC) 40 MG capsule Take 40 mg by mouth daily   Yes Historical Provider, MD   predniSONE 10 mg tablet Take 30 mg by mouth daily   Yes Historical Provider, MD   vedolizumab (Entyvio) 300 MG SOLR Infuse 300 mg into a venous catheter once a week   Yes Historical Provider, MD   predniSONE 5 mg tablet 40 mg daily X 14 days, then 35 mg daily for 7 days, then 30 mg daily for 7 days, then 25 mg daily X 7 days, then 20 mg dailyX 7 days, then 15 mg daily X 7 days, then 10 mg daily X 7 days, then 5 mg daily X 7 days 3/26/20   Andrae Berkowitz MD     I have reviewed home medications with patient personally      Allergies: No Known Allergies    Social History:    Substance Use History:   Social History     Substance and Sexual Activity   Alcohol Use Never    Frequency: Never    Drinks per session: Patient refused    Binge frequency: Never     Social History     Tobacco Use   Smoking Status Never Smoker   Smokeless Tobacco Never Used     Social History     Substance and Sexual Activity   Drug Use Never       Family History:    Family History   Problem Relation Age of Onset    Asthma Mother        Physical Exam:     Vitals:   Blood Pressure: 115/72 (07/23/20 0214)  Pulse: 94 (07/23/20 0214)  Temperature: 98 5 °F (36 9 °C) (07/23/20 0214)  Temp Source: Oral (07/23/20 0214)  Respirations: 16 (07/23/20 0214)  Height: 5' 5" (165 1 cm) (07/23/20 0214)  Weight - Scale: 46 3 kg (102 lb) (07/23/20 0214)  SpO2: 96 % (07/23/20 0214)    Physical Exam   Constitutional: He is oriented to person, place, and time  He appears well-developed and well-nourished  No distress  HENT:   Head: Normocephalic and atraumatic  Eyes: EOM are normal    Cardiovascular: Regular rhythm, normal heart sounds and intact distal pulses  Exam reveals no friction rub  No murmur heard  Tachycardic   Pulmonary/Chest: Effort normal and breath sounds normal  No respiratory distress  He has no wheezes  He has no rales  Abdominal: Soft  Bowel sounds are normal  He exhibits no distension  There is tenderness (Generalized tenderness palpation all 4 quadrants)  There is no rebound and no guarding  Musculoskeletal: He exhibits no edema or tenderness  Neurological: He is alert and oriented to person, place, and time  Skin: Skin is warm and dry  Capillary refill takes less than 2 seconds  No rash noted  He is not diaphoretic  No erythema  Psychiatric: He has a normal mood and affect  Vitals reviewed  Additional Data:     Lab Results: I have personally reviewed pertinent reports        Results from last 7 days   Lab Units 07/22/20 2052   WBC Thousand/uL 11 71*   HEMOGLOBIN g/dL 16 4   HEMATOCRIT % 50 2*   PLATELETS Thousands/uL 480*   NEUTROS PCT % 70   LYMPHS PCT % 18   MONOS PCT % 10   EOS PCT % 0     Results from last 7 days   Lab Units 07/22/20 2052   SODIUM mmol/L 133*   POTASSIUM mmol/L 4 6   CHLORIDE mmol/L 96*   CO2 mmol/L 30   BUN mg/dL 16   CREATININE mg/dL 1 24   ANION GAP mmol/L 7   CALCIUM mg/dL 9 6   ALBUMIN g/dL 3 3*   TOTAL BILIRUBIN mg/dL 0 60   ALK PHOS U/L 105   ALT U/L 13   AST U/L 15   GLUCOSE RANDOM mg/dL 104     Results from last 7 days   Lab Units 07/22/20 2052   INR  1 13             Results from last 7 days   Lab Units 07/22/20 2056   LACTIC ACID mmol/L 1 4       Imaging: I have personally reviewed pertinent reports  CT abdomen pelvis with contrast   Final Result by Faith Moreno DO (07/22 2326)      Moderate wall thickening and hyperemia involving multiple loops of distal ileum as well as the cecum and ascending colon and the proximal transverse colon which correlates with the patient's history of Crohn's disease  There is a conglomeration of    inflamed small bowel loops in the right lower quadrant (axial image 42, series 2)  Surrounding mesenteric fat stranding in the right hemiabdomen as well as some prominent mesenteric lymph nodes and a small amount of fluid in the right hemiabdomen and    posterior pelvis, probably reactive  The findings have significantly intervally worsened when compared to prior  No discrete evidence of bowel obstruction  No abscess is seen  Other findings as above  Workstation performed: [id number]             EKG, Pathology, and Other Studies Reviewed on Admission:   · EKG: none    Allscripts / Epic Records Reviewed: Yes     ** Please Note: This note has been constructed using a voice recognition system   **

## 2020-07-23 NOTE — ASSESSMENT & PLAN NOTE
Sodium of 133 on admission  Patient reports poor PO intake for some time, but certainly worse in the past 2 days since his abdominal pain, nausea/poor appetite started  Support with IVFs while NPO  Repeat BMP in a m      Lab Results   Component Value Date    SODIUM 131 (L) 07/23/2020    K 4 8 07/23/2020    CL 97 (L) 07/23/2020    CO2 27 07/23/2020    BUN 14 07/23/2020    CREATININE 1 40 (H) 07/23/2020    GLUC 142 (H) 07/23/2020    CALCIUM 8 8 07/23/2020

## 2020-07-23 NOTE — CONSULTS
Consultation -  [facility]  Germaine Nickerson 26 y o  male MRN: [id number]  Unit/Bed#: [address] [place] [address] Encounter: [id number]        Inpatient consult to gastroenterology  Consult performed by: Meek Ybarra MD  Consult ordered by: Genaro Arellano PA-C          Reason for Consult / Principal Problem:     Crohn's disease      ASSESSMENT AND PLAN:      1  Fistulizing Crohn's disease  - CT shows inflammation throughout the small bowel and colon  Findings are worse than in March  - check C diff, stool enteric panel, fecal calprotectin and CRP  - obtain results of last EGD and colonoscopy  - continue IV steroids (methylpred 20 t i d )  - continue IV antibiotics (Cipro and Flagyl)  - start on clear liquids and advance as tolerated  - continue supportive care with IVFs  - agree with surgery consult    ______________________________________________________________________    HPI:  71-year-old man with fistulizing Crohn's disease (reaction to infliximab, poor compliance with Humira, recently started on Entyvio) and asthma who presented to ER yesterday for generalized abdominal pain  Abdominal pain  -abdominal pain began 1 day prior to admission  -generalized  -severity ranges from 5-9 out of 10  - +nausea, anorexia, poor appetite  - last bowel movement was day prior to admission  - reports multiple flares of IBD symptoms and overall p o  Intake poor current Crohn's flares due to nausea  -was noted to have high fever and ER with temp of 102 4  - he was on prednisone 30 mg daily at home    Labs notable for mildly elevated white blood count 11 71 on admission increased to 14 48 today and elevated platelets (478 on admission)  CT abdomen pelvis with contrast shows moderate wall thickening and hyperemia involving multiple loops of distal ileum as well as cecum, ascending colon and proximal transverse colon    Conglomeration of inflamed small bowel loops in the right lower quadrant    Surrounding mesenteric fat stranding in the right hemiabdomen as well as some prominent mesenteric lymph nodes a small amount of fluid in the right rika abdomen posterior pelvis    Findings have significantly worsened when compared to prior  No discrete evidence of bowel obstruction  No abscess seen  Started on IV antibiotics and steroids  IBD history  -Crohn's disease diagnosed at age 16  -was previously followed with Dr Jordy Licea in [place], [place]  -had been on Lialda but this was not effective  -also previously on 6MP as well but did not continue  - poor compliance of adalimumab  -had reaction to infliximab  - admitted in March 2020 with Crohn's flare  CT at that time showed inflammatory changes in the right lower quadrant, thickening of the TI with poststenotic dilation, another stenotic area in the distal ileum also with poststenotic dilation, several fluid tracks likely blind and fistulas extending from area of terminal ileum  Started on steroids and antibiotics at that time  -EGD and colonoscopy at [facility]  -started [facility] 7/14          REVIEW OF SYSTEMS:    CONSTITUTIONAL: Denies any fever, chills, rigors, and weight loss  HEENT: No earache or tinnitus  Denies hearing loss or visual disturbances  CARDIOVASCULAR: No chest pain or palpitations  RESPIRATORY: Denies any cough, hemoptysis, shortness of breath or dyspnea on exertion  GASTROINTESTINAL: As noted in the History of Present Illness  GENITOURINARY: No problems with urination  Denies any hematuria or dysuria  NEUROLOGIC: No dizziness or vertigo, denies headaches  MUSCULOSKELETAL: Denies any muscle or joint pain  SKIN: Denies skin rashes or itching  ENDOCRINE: Denies excessive thirst  Denies intolerance to heat or cold  PSYCHOSOCIAL: Denies depression or anxiety  Denies any recent memory loss  Historical Information   Past Medical History:   Diagnosis Date    Asthma     Crohn disease ([facility] [address] )      History reviewed   No pertinent surgical history  Social History   Social History     Substance and Sexual Activity   Alcohol Use Never    Frequency: Never    Drinks per session: Patient refused    Binge frequency: Never     Social History     Substance and Sexual Activity   Drug Use Never     Social History     Tobacco Use   Smoking Status Never Smoker   Smokeless Tobacco Never Used     Family History   Problem Relation Age of Onset    Asthma Mother        Meds/Allergies     Medications Prior to Admission   Medication    omeprazole (PriLOSEC) 40 MG capsule    predniSONE 10 mg tablet    vedolizumab (Entyvio) 300 MG SOLR    predniSONE 5 mg tablet     Current Facility-Administered Medications   Medication Dose Route Frequency    acetaminophen (TYLENOL) tablet 650 mg  650 mg Oral Q6H PRN    ciprofloxacin (CIPRO) IVPB (premix) 400 mg 200 mL  400 mg Intravenous Q12H    methylPREDNISolone sodium succinate (Solu-MEDROL) injection 20 mg  20 mg Intravenous Q8H [address]    metroNIDAZOLE (FLAGYL) IVPB (premix) 500 mg 100 mL  500 mg Intravenous Q8H    morphine injection 2 mg  2 mg Intravenous Q4H PRN    ondansetron (ZOFRAN) injection 4 mg  4 mg Intravenous Q6H PRN    pantoprazole (PROTONIX) EC tablet 40 mg  40 mg Oral Early Morning    sodium chloride 0 9 % infusion  100 mL/hr Intravenous Continuous       No Known Allergies        Objective     Blood pressure 96/73, pulse 80, temperature (!) 97 4 °F (36 3 °C), temperature source Oral, resp  rate 16, height 5' 5" (1 651 m), weight 46 3 kg (102 lb), SpO2 99 %  Body mass index is 16 97 kg/m²  Intake/Output Summary (Last 24 hours) at 7/23/2020 1122  Last data filed at 7/23/2020 0545  Gross per 24 hour   Intake 1723 33 ml   Output    Net 1723 33 ml         PHYSICAL EXAM:      General Appearance:   Alert, cooperative, no distress   HEENT:   Normocephalic, atraumatic, anicteric       Neck:  Supple, symmetrical, trachea midline   Lungs:   Clear to auscultation bilaterally; no rales, rhonchi or wheezing; respirations unlabored    Heart[de-identified]   Regular rate and rhythm; no murmur, rub, or gallop  Abdomen:   Soft, non-tender, non-distended; normal bowel sounds; no masses, no organomegaly    Genitalia:   Deferred    Rectal:   Deferred    Extremities:  No cyanosis, clubbing or edema    Pulses:  2+ and symmetric all extremities    Skin:  No jaundice, rashes, or lesions    Lymph nodes:  No palpable cervical lymphadenopathy        Lab Results:   Admission on 07/22/2020   Component Date Value    WBC 07/22/2020 11 71*    RBC 07/22/2020 5 71*    Hemoglobin 07/22/2020 16 4     Hematocrit 07/22/2020 50 2*    MCV 07/22/2020 88     MCH 07/22/2020 28 7     MCHC 07/22/2020 32 7     RDW 07/22/2020 13 8     MPV 07/22/2020 8 6*    Platelets 91/15/2987 480*    nRBC 07/22/2020 0     Neutrophils Relative 07/22/2020 70     Immat GRANS % 07/22/2020 1     Lymphocytes Relative 07/22/2020 18     Monocytes Relative 07/22/2020 10     Eosinophils Relative 07/22/2020 0     Basophils Relative 07/22/2020 1     Neutrophils Absolute 07/22/2020 8 37*    Immature Grans Absolute 07/22/2020 0 07     Lymphocytes Absolute 07/22/2020 2 05     Monocytes Absolute 07/22/2020 1 13     Eosinophils Absolute 07/22/2020 0 02     Basophils Absolute 07/22/2020 0 07     Protime 07/22/2020 14 4     INR 07/22/2020 1 13     PTT 07/22/2020 30     Sodium 07/22/2020 133*    Potassium 07/22/2020 4 6     Chloride 07/22/2020 96*    CO2 07/22/2020 30     ANION GAP 07/22/2020 7     BUN 07/22/2020 16     Creatinine 07/22/2020 1 24     Glucose 07/22/2020 104     Calcium 07/22/2020 9 6     AST 07/22/2020 15     ALT 07/22/2020 13     Alkaline Phosphatase 07/22/2020 105     Total Protein 07/22/2020 8 5*    Albumin 07/22/2020 3 3*    Total Bilirubin 07/22/2020 0 60     eGFR 07/22/2020 93     Magnesium 07/22/2020 2 2     Lipase 07/22/2020 159     LACTIC ACID 07/22/2020 1 4     Blood Culture 07/22/2020 Received in Microbiology Lab  Culture in Progress   Blood Culture 07/22/2020 Received in Microbiology Lab  Culture in Progress   Color, UA 07/23/2020 Yellow     Clarity, UA 07/23/2020 Clear     Specific Gravity, UA 07/23/2020 1 010     pH, UA 07/23/2020 6 5     Leukocytes, UA 07/23/2020 Negative     Nitrite, UA 07/23/2020 Negative     Protein, UA 07/23/2020 Negative     Glucose, UA 07/23/2020 Negative     Ketones, UA 07/23/2020 Negative     Urobilinogen, UA 07/23/2020 0 2     Bilirubin, UA 07/23/2020 Negative     Blood, UA 07/23/2020 Negative     SARS-CoV-2 07/23/2020 Negative     Sodium 07/23/2020 131*    Potassium 07/23/2020 4 8     Chloride 07/23/2020 97*    CO2 07/23/2020 27     ANION GAP 07/23/2020 7     BUN 07/23/2020 14     Creatinine 07/23/2020 1 40*    Glucose 07/23/2020 142*    Calcium 07/23/2020 8 8     eGFR 07/23/2020 81     WBC 07/23/2020 14 48*    RBC 07/23/2020 4 83     Hemoglobin 07/23/2020 14 0     Hematocrit 07/23/2020 42 2     MCV 07/23/2020 87     MCH 07/23/2020 29 0     MCHC 07/23/2020 33 2     RDW 07/23/2020 13 6     MPV 07/23/2020 9 1     Platelets 94/33/9801 419*    nRBC 07/23/2020 0     Neutrophils Relative 07/23/2020 93*    Immat GRANS % 07/23/2020 1     Lymphocytes Relative 07/23/2020 5*    Monocytes Relative 07/23/2020 1*    Eosinophils Relative 07/23/2020 0     Basophils Relative 07/23/2020 0     Neutrophils Absolute 07/23/2020 13 38*    Immature Grans Absolute 07/23/2020 0 12     Lymphocytes Absolute 07/23/2020 0 76     Monocytes Absolute 07/23/2020 0 19     Eosinophils Absolute 07/23/2020 0 00     Basophils Absolute 07/23/2020 0 03        Imaging Studies: I have personally reviewed pertinent imaging studies      Endoscopies:

## 2020-07-23 NOTE — PLAN OF CARE
Problem: Potential for Falls  Goal: Patient will remain free of falls  Description  INTERVENTIONS:  - Assess patient frequently for physical needs  -  Identify cognitive and physical deficits and behaviors that affect risk of falls  -  [place] fall precautions as indicated by assessment   - Educate patient/family on patient safety including physical limitations  - Instruct patient to call for assistance with activity based on assessment  - Modify environment to reduce risk of injury  - Consider OT/PT consult to assist with strengthening/mobility  Outcome: Progressing     Problem: PAIN - ADULT  Goal: Verbalizes/displays adequate comfort level or baseline comfort level  Description  Interventions:  - Encourage patient to monitor pain and request assistance  - Assess pain using appropriate pain scale  - Administer analgesics based on type and severity of pain and evaluate response  - Implement non-pharmacological measures as appropriate and evaluate response  - Consider cultural and social influences on pain and pain management  - Notify physician/advanced practitioner if interventions unsuccessful or patient reports new pain  Outcome: Progressing - Patient has no complaints of pain at this time  Problem: INFECTION - ADULT  Goal: Absence or prevention of progression during hospitalization  Description  INTERVENTIONS:  - Assess and monitor for signs and symptoms of infection  - Monitor lab/diagnostic results  - Monitor all insertion sites, i e  indwelling lines  r  - Administer medications as ordered  - Instruct and encourage patient and family to use good hand hygiene technique     Outcome: Progressing  Goal: Absence of fever/infection during neutropenic period  Description  INTERVENTIONS:  - Monitor WBC    Outcome: Progressing     Problem: GASTROINTESTINAL - ADULT  Goal: Maintains or returns to baseline bowel function  Description  INTERVENTIONS:  - Assess bowel function  - Administer IV fluids if ordered to ensure adequate hydration  - Administer ordered medications as needed  - Encourage mobilization and activity  - Consider nutritional services referral to assist patient with adequate nutrition and appropriate food choices   Outcome: Progressing  Goal: Maintains adequate nutritional intake  Description  INTERVENTIONS:  - Monitor I&O, weight, and lab values if indicated  - Obtain nutrition services referral as needed   Outcome: Progressing     Problem: Nutrition/Hydration-ADULT  Goal: Nutrient/Hydration intake appropriate for improving, restoring or maintaining nutritional needs  Description  Monitor and assess patient's nutrition/hydration status for malnutrition  Collaborate with interdisciplinary team and initiate plan and interventions as ordered  Monitor patient's weight and dietary intake as ordered or per policy  Utilize nutrition screening tool and intervene as necessary  Determine patient's food preferences and provide high-protein, high-caloric foods as appropriate       INTERVENTIONS:  - Monitor oral intake, urinary output, labs, and treatment plans  - Assess nutrition and hydration status and recommend course of action  - Evaluate amount of meals eaten  - Assist patient with eating if necessary   - Allow adequate time for meals  - Recommend/ encourage appropriate diets, oral nutritional supplements, and vitamin/mineral supplements  - Order, calculate, and assess calorie counts as needed  - Recommend, monitor, and adjust tube feedings and TPN/PPN based on assessed needs  - Assess need for intravenous fluids  - Provide specific nutrition/hydration education as appropriate  - Include patient/family/caregiver in decisions related to nutrition  Outcome: Progressing

## 2020-07-23 NOTE — ASSESSMENT & PLAN NOTE
In the setting of Crohn's disease, patient has had multiple flares in the recent months and reports overall poor PO intake, poor appetite  BMI 16 97  Appreciate GI, nutrition consults  Body mass index is 16 97 kg/m²

## 2020-07-23 NOTE — PLAN OF CARE
Problem: Potential for Falls  Goal: Patient will remain free of falls  Description  INTERVENTIONS:  - Assess patient frequently for physical needs  -  Identify cognitive and physical deficits and behaviors that affect risk of falls    -  [place] fall precautions as indicated by assessment   - Educate patient/family on patient safety including physical limitations  - Instruct patient to call for assistance with activity based on assessment  - Modify environment to reduce risk of injury  - Consider OT/PT consult to assist with strengthening/mobility  Outcome: Progressing

## 2020-07-23 NOTE — SOCIAL WORK
Day 1, Pt is not a MB  Per chart review, nurse and provider rounds pt is A&Ox4, independent and being treated for Crohn's flare up  No discharge needs identified at this time

## 2020-07-23 NOTE — MALNUTRITION/BMI
This medical record reflects one or more clinical indicators suggestive of malnutrition  Malnutrition Findings:   Malnutrition type: Chronic illness(Severe protein calorie malnutrition of chronic illness related to inadequate energy intake as evidenced by 22% weight loss in 6 months, hollow looking orbits, protruding clavicle  Awaiting treatment plan )  Degree of Malnutrition: Other severe protein calorie malnutrition  Malnutrition Characteristics: Fat loss, Weight loss    BMI Findings:  BMI Classifications: Underweight < 18 5     Body mass index is 16 97 kg/m²  See Nutrition note dated 7/23/2020 for additional details  Completed nutrition assessment is viewable in the nutrition documentation

## 2020-07-23 NOTE — CONSULTS
Consultation - General Surgery   Linh Love 26 y o  male MRN: [id number]  Unit/Bed#: [address] [place] [address] Encounter: [id number]    Assessment/Plan     Assessment:  Crohn's disease excerbation: right sided abdominal pain x 2 days  Denies nausea, vomiting  Last BM yesterday  S/p colonoscopy on 07/17 [facility] [place] with Dr Yasemin Waite  WBC: 14 48   CT scan revealed: Thickening hyperemia of distal ileum cecum ascending colon and proximal transverse colon    Plan:  NPO  IV fluids  IV antibiotics: Cipro, Flagyl, Zosyn  GI consulted  No acute surgical intervention at this time  Serial abdominal exams  Consider transfer to [facility] for Crohn's disease clinic  HPI:  Linh Love is a 26 y o  male past medical history of Crohn's disease, in childhood asthma  Patient presents to the emergency department with right-sided abdominal pain x2 days  Patient reports pain feels similar to previous Crohn's flares  Patient denies any nausea, vomiting, constipation, diarrhea, blood in stools, dark tarry stools  Patient follow with Dr Yasemin Waite at [facility] for his Crohn's disease  Patient reports he had EGD and colonoscopy on 07/17 at [facility]  Patient reports starting Entyvio on 07/14  Inpatient consult to Acute Care Surgery  Consult performed by: Agustin Vargas PA-C  Consult ordered by: Yumiko Turner PA-C          Review of Systems   Constitutional: Negative for chills, fatigue and fever  HENT: Negative for congestion, ear pain, hearing loss, postnasal drip, sinus pressure, sinus pain and sore throat  Eyes: Negative for pain and discharge  Respiratory: Negative for chest tightness and shortness of breath  Cardiovascular: Negative for chest pain  Gastrointestinal: Positive for abdominal pain  Negative for constipation, nausea and vomiting  Genitourinary: Negative for difficulty urinating  Musculoskeletal: Negative for arthralgias and myalgias  Skin: Negative for rash     Neurological: Negative for dizziness and headaches  Psychiatric/Behavioral: Negative for behavioral problems  Historical Information   Past Medical History:   Diagnosis Date    Asthma     Crohn disease ([facility] [address] )      History reviewed  No pertinent surgical history  Social History   Social History     Substance and Sexual Activity   Alcohol Use Never    Frequency: Never    Drinks per session: Patient refused    Binge frequency: Never     Social History     Substance and Sexual Activity   Drug Use Never     E-Cigarette/Vaping    E-Cigarette Use Never User      E-Cigarette/Vaping Substances     Social History     Tobacco Use   Smoking Status Never Smoker   Smokeless Tobacco Never Used     Family History: non-contributory    Meds/Allergies   all current active meds have been reviewed  No Known Allergies    Objective   First Vitals:   Blood Pressure: 139/88 (07/22/20 2041)  Pulse: (!) 138 (07/22/20 2041)  Temperature: (!) 100 6 °F (38 1 °C) (07/22/20 2041)  Temp Source: Tympanic (07/22/20 2041)  Respirations: (!) 24 (07/22/20 2041)  Height: 5' 5" (165 1 cm) (07/23/20 0214)  Weight - Scale: 46 3 kg (102 lb) (07/22/20 2041)  SpO2: 98 % (07/22/20 2041)    Current Vitals:   Blood Pressure: 96/73 (07/23/20 0700)  Pulse: 80 (07/23/20 0700)  Temperature: (!) 97 4 °F (36 3 °C) (07/23/20 0700)  Temp Source: Oral (07/23/20 0700)  Respirations: 16 (07/23/20 0700)  Height: 5' 5" (165 1 cm) (07/23/20 0214)  Weight - Scale: 46 3 kg (102 lb) (07/23/20 0214)  SpO2: 99 % (07/23/20 0700)      Intake/Output Summary (Last 24 hours) at 7/23/2020 0925  Last data filed at 7/23/2020 0545  Gross per 24 hour   Intake 1723 33 ml   Output    Net 1723 33 ml       Invasive Devices     Peripheral Intravenous Line            Peripheral IV 07/22/20 Left Antecubital less than 1 day                Physical Exam   Constitutional: He is oriented to person, place, and time  He appears well-developed and well-nourished  No distress  Neck: Neck supple  Cardiovascular: Normal rate, regular rhythm and normal heart sounds  No murmur heard  Pulmonary/Chest: Effort normal and breath sounds normal  No stridor  No respiratory distress  He has no wheezes  Abdominal: Soft  Bowel sounds are normal  He exhibits no distension  There is no hepatosplenomegaly  There is tenderness in the right upper quadrant, right lower quadrant and periumbilical area  There is guarding  There is no rigidity, no rebound, no CVA tenderness, no tenderness at McBurney's point and negative Andino's sign  No hernia  Musculoskeletal: Normal range of motion  He exhibits no edema  Neurological: He is alert and oriented to person, place, and time  No cranial nerve deficit  Skin: Skin is warm  Capillary refill takes less than 2 seconds  He is not diaphoretic  Psychiatric: He has a normal mood and affect  His behavior is normal    Nursing note and vitals reviewed  Lab Results:   I have personally reviewed pertinent lab results  , CBC:   Lab Results   Component Value Date    WBC 14 48 (H) 07/23/2020    HGB 14 0 07/23/2020    HCT 42 2 07/23/2020    MCV 87 07/23/2020     (H) 07/23/2020    MCH 29 0 07/23/2020    MCHC 33 2 07/23/2020    RDW 13 6 07/23/2020    MPV 9 1 07/23/2020    NRBC 0 07/23/2020   , CMP:   Lab Results   Component Value Date    SODIUM 131 (L) 07/23/2020    K 4 8 07/23/2020    CL 97 (L) 07/23/2020    CO2 27 07/23/2020    BUN 14 07/23/2020    CREATININE 1 40 (H) 07/23/2020    CALCIUM 8 8 07/23/2020    AST 15 07/22/2020    ALT 13 07/22/2020    ALKPHOS 105 07/22/2020    EGFR 81 07/23/2020     Imaging: I have personally reviewed pertinent reports  EKG, Pathology, and Other Studies: I have personally reviewed pertinent reports  Counseling / Coordination of Care  Total floor / unit time spent today 60 minutes  Greater than 50% of total time was spent with the patient and / or family counseling and / or coordination of care    A description of the counseling / coordination of care: Attending history, performing physical exam, reviewing pertinent labs and imaging, discussing case with attending

## 2020-07-23 NOTE — UTILIZATION REVIEW
Initial Clinical Review    Admission: Date/Time/Statement: Admission Orders (From admission, onward)     Ordered        07/22/20 2343  Inpatient Admission  Once                   Orders Placed This Encounter   Procedures    Inpatient Admission     Standing Status:   Standing     Number of Occurrences:   1     Order Specific Question:   Admitting Physician     Answer:   Katherine Wilkerson [[id number]]     Order Specific Question:   Level of Care     Answer:   Med Surg [16]     Order Specific Question:   Estimated length of stay     Answer:   More than 2 Midnights     Order Specific Question:   Certification     Answer:   I certify that inpatient services are medically necessary for this patient for a duration of greater than two midnights  See H&P and MD Progress Notes for additional information about the patient's course of treatment  ED Arrival Information     Expected Arrival Acuity Means of Arrival Escorted By Service Admission Type    - 7/22/2020 20:27 Urgent Walk-In Family Member Hospitalist Urgent    Arrival Complaint    Abdominal Pain         Chief Complaint   Patient presents with    Abdominal Pain     abd pain since yesterday, hx of chrohns  feels like a flare up     Assessment/Plan: 26 y o  male who presents with PMH of Crohn's disease, childhood asthma who presented to the ED today with a complaint of generalized abdominal pain  He states that the pain started yesterday and ranges from a 5 to an 8 or 9/10  He also complains of nausea and overall poor PO intake, poor appetite  States has had multiple flares and frequently feels nauseous with Crohn's flare ups  In ED found fever 102 he is s/p egd and colonoscopy 7/17  Recently started on Entyvio  1st dose was 7/14  Patient admitted with Exacerbation Crohn's disease    CT showing moderate wall thickening and hyperemia involving multiple loops of distal ileum as well as the cecum, ascending colon, and proximal transverse colon, with inflamed small bowel loops and right lower quadrant and surrounding mesenteric fat stranding and right hemiabdomen and prominent mesenteric lymph nodes; no discrete evidence of bowel obstruction, no abscess  Continue IV Solumedrol 60 mg , continue IV Cipro Flagyl  Consult GI general surgery  Sepsis evidenced by tachycardia, tachypnea temp 102 4 wbc 11 concern for intraabdominal infection   Blood cx pending continue abx  Severe protein malnutrition BMI 16 97 ,gi nutrtion consults  Hyponatremia na 133 continue ivf while npo  SURGERY CONSULT  Crohn's disease excerbation: right sided abdominal pain x 2 days  Denies nausea, vomiting  Last BM yesterday  S/p colonoscopy on 07/17 [facility] follow with Dr Luz Jhaveri  WBC: 14 48   CT scan revealed: Thickening hyperemia of distal ileum cecum ascending colon and proximal transverse colon   NPO,IVF iv abx gi consulted  No acute surgical intervention at this time  Serial abdominal exams consider transfer to [facility] for Crohn's clinic  GI CONSULT  Fistulizing Crohn's disease Ct scan shows inflammation throughout the small bowel and colon  Findings are worse than in March  Check c diff ,stool enteric panel, fecal calprotectin and crp  Obtain results last egd colonoscopy  Continue IV Steroid 20 tid, IV Cipro and Flagyl  Start on clear liquid  IVF     ED Triage Vitals [07/22/20 2041]   Temperature Pulse Respirations Blood Pressure SpO2   (!) 100 6 °F (38 1 °C) (!) 138 (!) 24 139/88 98 %      Temp Source Heart Rate Source Patient Position - Orthostatic VS BP Location FiO2 (%)   Tympanic Radial Sitting Right arm --      Pain Score       9        Wt Readings from Last 1 Encounters:   07/23/20 46 3 kg (102 lb)     Additional Vital Signs:   /23/20 07:00:53  97 4 °F (36 3 °C)Abnormal   80  16  96/73  81  99 %  None (Room air)  Lying   07/23/20 02:14:41  98 5 °F (36 9 °C)  94  16  115/72  86  96 %  None (Room air)  Lying   07/23/20 0145  99 2 °F (37 3 °C)          97 %       07/23/20 0000        114/76  91  100 %      07/22/20 2351  102 4 °F (39 1 °C)Abnormal   114Abnormal   22  114/76    98 %         Pertinent Labs/Diagnostic Test Results:   7/22 ct abdomen Moderate wall thickening and hyperemia involving multiple loops of distal ileum as well as the cecum and ascending colon and the proximal transverse colon which correlates with the patient's history of Crohn's disease  Patience Marine is a conglomeration of   inflamed small bowel loops in the right lower quadrant (axial image 42, series 2)   Surrounding mesenteric fat stranding in the right hemiabdomen as well as some prominent mesenteric lymph nodes and a small amount of fluid in the right hemiabdomen and   posterior pelvis, probably reactive  The findings have significantly intervally worsened when compared to prior    No discrete evidence of bowel obstruction   No abscess is seen    Results from last 7 days   Lab Units 07/23/20  0009   SARS-COV-2  Negative     Results from last 7 days   Lab Units 07/23/20 0458 07/22/20 2052   WBC Thousand/uL 14 48* 11 71*   HEMOGLOBIN g/dL 14 0 16 4   HEMATOCRIT % 42 2 50 2*   PLATELETS Thousands/uL 419* 480*   NEUTROS ABS Thousands/µL 13 38* 8 37*     Results from last 7 days   Lab Units 07/23/20 0458 07/22/20 2052   SODIUM mmol/L 131* 133*   POTASSIUM mmol/L 4 8 4 6   CHLORIDE mmol/L 97* 96*   CO2 mmol/L 27 30   ANION GAP mmol/L 7 7   BUN mg/dL 14 16   CREATININE mg/dL 1 40* 1 24   EGFR ml/min/1 73sq m 81 93   CALCIUM mg/dL 8 8 9 6   MAGNESIUM mg/dL  --  2 2     Results from last 7 days   Lab Units 07/22/20 2052   AST U/L 15   ALT U/L 13   ALK PHOS U/L 105   TOTAL PROTEIN g/dL 8 5*   ALBUMIN g/dL 3 3*   TOTAL BILIRUBIN mg/dL 0 60     Results from last 7 days   Lab Units 07/23/20 0458 07/22/20 2052   GLUCOSE RANDOM mg/dL 142* 104     Results from last 7 days   Lab Units 07/22/20 2052   PROTIME seconds 14 4   INR  1 13   PTT seconds 30     Results from last 7 days   Lab Units 07/22/20 2056   LACTIC ACID mmol/L 1 4     Results from last 7 days   Lab Units 07/22/20 2052   LIPASE u/L 159     Results from last 7 days   Lab Units 07/23/20  0058   CLARITY UA  Clear   COLOR UA  Yellow   SPEC GRAV UA  1 010   PH UA  6 5   GLUCOSE UA mg/dl Negative   KETONES UA mg/dl Negative   BLOOD UA  Negative   PROTEIN UA mg/dl Negative   NITRITE UA  Negative   BILIRUBIN UA  Negative   UROBILINOGEN UA E U /dl 0 2   LEUKOCYTES UA  Negative     Results from last 7 days   Lab Units 07/22/20 2056 07/22/20 2052   BLOOD CULTURE  Received in Microbiology Lab  Culture in Progress  Received in Microbiology Lab  Culture in Progress       ED Treatment:   Medication Administration from 07/22/2020 2027 to 07/23/2020 0208       Date/Time Order Dose Route Action Action by Comments     07/22/2020 2304 sodium chloride 0 9 % bolus 1,000 mL 0 mL Intravenous Stopped Mari Rivas, FRANCISCO      07/22/2020 2059 sodium chloride 0 9 % bolus 1,000 mL 1,000 mL Intravenous New Bag Deandre Deep, RN      07/22/2020 2148 morphine (PF) 4 mg/mL injection 4 mg 4 mg Intravenous Given Upland Deep, RN      07/22/2020 2147 ondansetron (ZOFRAN) injection 4 mg 4 mg Intravenous Given Deandre Deep, RN      07/22/2020 2257 iohexol (OMNIPAQUE) 350 MG/ML injection (SINGLE-DOSE) 100 mL 100 mL Intravenous Given Shani Arana      07/23/2020 0034 piperacillin-tazobactam (ZOSYN) IVPB 3 375 g 0 g Intravenous Stopped Mari Rivas RN      07/22/2020 2351 piperacillin-tazobactam (ZOSYN) IVPB 3 375 g 3 375 g Intravenous New Bag Mari Rivas RN      07/22/2020 2357 methylPREDNISolone sodium succinate (Solu-MEDROL) injection 60 mg 60 mg Intravenous Given Amparo Florian RN      07/22/2020 2357 acetaminophen (TYLENOL) rectal suppository 975 mg 975 mg Rectal Given Amparo Florian RN         Past Medical History:   Diagnosis Date    Asthma     Crohn disease ([facility] )      Present on Admission:   Exacerbation of Crohn's disease ([facility] )   Hyponatremia   Sepsis without acute organ dysfunction (HCC)   Severe protein-calorie malnutrition (HCC)      Admitting Diagnosis: Severe protein-calorie malnutrition ([facility] ) [E43]  Abdominal pain [R10 9]  Crohn's colitis ([facility] ) [K50 10]  Exacerbation of Crohn's disease ([facility] ) [K50 90]  Sepsis ([facility] ) [A41 9]  Age/Sex: 26 y o  male  Admission Orders:  gmf  Npo    Scheduled Medications:    Medications:  ciprofloxacin 400 mg Intravenous Q12H   methylPREDNISolone sodium succinate 20 mg Intravenous Q8H [address]   metroNIDAZOLE 500 mg Intravenous Q8H   pantoprazole 40 mg Oral Early Morning     Continuous IV Infusions:    sodium chloride 100 mL/hr Intravenous Continuous     PRN Meds:    acetaminophen 650 mg Oral Q6H PRN   morphine injection 2 mg Intravenous Q4H PRN   ondansetron 4 mg Intravenous Q6H PRN       IP CONSULT TO NUTRITION SERVICES  IP CONSULT TO GASTROENTEROLOGY  IP CONSULT TO ACUTE CARE SURGERY    Network Utilization Review Department  [email or web address]  ATTENTION: Please call with any questions or concerns to [phone] and carefully listen to the prompts so that you are directed to the right person  All voicemails are confidential   Yanet Joya all requests for admission clinical reviews, approved or denied determinations and any other requests to dedicated fax number below belonging to the campus where the patient is receiving treatment   List of dedicated fax numbers for the Facilities:  FACILITY NAME UR FAX NUMBER   ADMISSION DENIALS (Administrative/Medical Necessity) [phone]   [address] (Maternity/NICU/Pediatrics) [phone]   Ladan Neumann [phone]   Daniel Erwin [phone]   [address] [place] [address] [phone]   [address] [facility] [address] [facility] [address] [facility] [phone] [address] [place] [address] [phone]

## 2020-07-23 NOTE — PROGRESS NOTES
Progress Note - Radha Masterson 1995, 26 y o  male MRN: [id number]    Unit/Bed#: [address] Encounter: [id number]    Primary Care Provider: Bea Kendall   Date and time admitted to hospital: 7/22/2020  8:42 PM        * Exacerbation of Crohn's disease [facility])  Assessment & Plan  Patient presented to the ED today with worsening generalized abdominal pain for the past 2 days  He has a history of Crohn's disease and has had multiple recent flares in the past few months  Patient underwent EGD/colonoscopy on 7/17 at [facility] in [place]  Requested medical records  - CT in the ED showed moderate wall thickening and hyperemia involving multiple loops of distal ileum as well as the cecum, ascending colon, and proximal transverse colon, with inflamed small bowel loops and right lower quadrant and surrounding mesenteric fat stranding and right hemiabdomen and prominent mesenteric lymph nodes; no discrete evidence of bowel obstruction, no abscess  - Patient was given Solu-Medrol 60 mg IV, will continue Solu-Medrol dosing  - Received Zosyn in the ED, continue with Cipro and Flagyl  - patient states he was started on Entyvio approximately 1 week ago  - Appreciate GI and general Surgery Consults  Patient's GI outpatient is Dr Lacy Prado  D/w Dr Keaton Kennedy, and he was okay with Other group seeing the patient here  Sepsis without acute organ dysfunction [facility])  Assessment & Plan  POA, as evidenced by tachycardia, tachypnea, temp of 102 4°F, WBC 11 with concern for intraabdominal infection in the setting of Crohn's disease  WBC may also be mildly elevated in part due to prolonged steroid taper   - lactic acid 1 4  - blood cultures pending  - follow up procal  - Continue Abx as above      Severe protein-calorie malnutrition ([address] )  Assessment & Plan   In the setting of Crohn's disease, patient has had multiple flares in the recent months and reports overall poor PO intake, poor appetite  BMI 16 97   Appreciate GI, nutrition consults  Body mass index is 16 97 kg/m²  Hyponatremia  Assessment & Plan  Sodium of 133 on admission  Patient reports poor PO intake for some time, but certainly worse in the past 2 days since his abdominal pain, nausea/poor appetite started  Support with IVFs while NPO  Repeat BMP in a m  Lab Results   Component Value Date    SODIUM 131 (L) 07/23/2020    K 4 8 07/23/2020    CL 97 (L) 07/23/2020    CO2 27 07/23/2020    BUN 14 07/23/2020    CREATININE 1 40 (H) 07/23/2020    GLUC 142 (H) 07/23/2020    CALCIUM 8 8 07/23/2020           Subjective:   I have seen and Examined the patient at the bedside  No CP or Sob  No fevers or chills, No nausea or vomiting  Overnight events reviewed with the RN  No Other complains  Patient still complains of some abdominal pain which is slightly better ago  This morning no fever, yesterday had some high-grade fevers though  Appetite is very poor  Objective: Wt Readings from Last 3 Encounters:   07/23/20 46 3 kg (102 lb)   03/23/20 58 1 kg (128 lb)     Temp Readings from Last 3 Encounters:   07/23/20 (!) 97 4 °F (36 3 °C) (Oral)   07/14/20 97 8 °F (36 6 °C) (Temporal)   03/25/20 97 8 °F (36 6 °C)     BP Readings from Last 3 Encounters:   07/23/20 96/73   07/14/20 122/75   03/25/20 122/73     Pulse Readings from Last 3 Encounters:   07/23/20 80   07/14/20 100   03/25/20 65        Review of System:   Denies any CP or SOB  Denies any Cough or Cold  Denies any focal tingling numbness or weakness in any extremities  Physical Exam:   General : Alert, Awake and oriented x 2-3, NAD  Cachectic and malnourished  Head exam:  Bilateral temporal muscle wasting noted, bilateral buffet hyper loss noted  Neck : Supple  Eyes:  BERT, EOMI  CVS : S1, S2, RRR    R/S : Clear to auscultate anteriorly  Abd: Soft, mildly tender to palpate diffusely, nondistended, bowel sounds diminished  Extremity: Trace pedal edema noted  Skin: No acute Rash noted     CNS: No acute FND       Labs and Imaging Data Reviewed by me:   Lab Results   Component Value Date    WBC 14 48 (H) 07/23/2020    HGB 14 0 07/23/2020    HCT 42 2 07/23/2020    MCV 87 07/23/2020     (H) 07/23/2020      Lab Results   Component Value Date    SODIUM 131 (L) 07/23/2020    K 4 8 07/23/2020    CL 97 (L) 07/23/2020    CO2 27 07/23/2020    BUN 14 07/23/2020    CREATININE 1 40 (H) 07/23/2020    GLUC 142 (H) 07/23/2020    CALCIUM 8 8 07/23/2020     No results found for: BNP   Lab Results   Component Value Date    INR 1 13 07/22/2020    INR 1 08 03/23/2020    PROTIME 14 4 07/22/2020    PROTIME 14 3 03/23/2020     Lab Results   Component Value Date    ALT 13 07/22/2020    AST 15 07/22/2020    ALKPHOS 105 07/22/2020

## 2020-07-23 NOTE — ASSESSMENT & PLAN NOTE
Patient presented to the ED today with worsening generalized abdominal pain for the past 2 days  He has a history of Crohn's disease and has had multiple recent flares in the past few months  Patient underwent EGD/colonoscopy on 7/17 at [facility] in [place]  Requested medical records  - CT in the ED showed moderate wall thickening and hyperemia involving multiple loops of distal ileum as well as the cecum, ascending colon, and proximal transverse colon, with inflamed small bowel loops and right lower quadrant and surrounding mesenteric fat stranding and right hemiabdomen and prominent mesenteric lymph nodes; no discrete evidence of bowel obstruction, no abscess  - Patient was given Solu-Medrol 60 mg IV, will continue Solu-Medrol dosing  - Received Zosyn in the ED, continue with Cipro and Flagyl  - patient states he was started on Entyvio approximately 1 week ago  - Appreciate GI and general Surgery Consults  Patient's GI outpatient is Dr Loretta Faust  D/w Dr Charles Sewell, and he was okay with Other group seeing the patient here

## 2020-07-23 NOTE — ASSESSMENT & PLAN NOTE
POA, as evidenced by tachycardia, tachypnea, temp of 102 4°F, WBC 11 with concern for intraabdominal infection in the setting of Crohn's disease   WBC may also be mildly elevated in part due to prolonged steroid taper   - lactic acid 1 4  - blood cultures pending  - follow up procal  - Continue Abx as above

## 2020-07-23 NOTE — SEPSIS NOTE
Sepsis Note   Aarti Manley 26 y o  male MRN: [id number]  Unit/Bed#: ED 08 Encounter: [id number]      qSOFA     Row Name 07/22/20 2041                Altered mental status GCS < 15          Respiratory Rate > / =69  1        Systolic BP < / =694  0        Q Sofa Score  1            Initial Sepsis Screening     Row Name 07/22/20 2340                Is the patient's history suggestive of a new or worsening infection? (!) Yes (Proceed)  -KF        Suspected source of infection  acute abdominal infection  -KF        Are two or more of the following signs & symptoms of infection both present and new to the patient? (!) Yes (Proceed)  -KF        Indicate SIRS criteria  Leukocytosis (WBC > 00030 IJL); Tachycardia > 90 bpm;Tachypnea > 20 resp per min  -KF        If the answer is yes to both questions, suspicion of sepsis is present          If severe sepsis is present AND tissue hypoperfusion perists in the hour after fluid resuscitation or lactate > 4, the patient meets criteria for SEPTIC SHOCK          Are any of the following organ dysfunction criteria present within 6 hours of suspected infection and SIRS criteria that are NOT considered to be chronic conditions?         Organ dysfunction          Date of presentation of severe sepsis          Time of presentation of severe sepsis          Tissue hypoperfusion persists in the hour after crystalloid fluid administration, evidenced, by either:          Was hypotension present within one hour of the conclusion of crystalloid fluid administration?           Date of presentation of septic shock          Time of presentation of septic shock            User Key  (r) = Recorded By, (t) = Taken By, (c) = Cosigned By    Initials Name Provider Type    [address], DO Physician

## 2020-07-23 NOTE — ASSESSMENT & PLAN NOTE
Sodium of 133 on admission  Patient reports poor PO intake for some time, but certainly worse in the past 2 days since his abdominal pain, nausea/poor appetite started  Support with IVFs while NPO  Repeat BMP in a m

## 2020-07-23 NOTE — ASSESSMENT & PLAN NOTE
Patient presented to the ED today with worsening generalized abdominal pain for the past 2 days  He has a history of Crohn's disease and has had multiple recent flares in the past few months  Patient underwent EGD/colonoscopy on 7/17 at  in [facility]  Requested medical records    - CT in the ED showed moderate wall thickening and hyperemia involving multiple loops of distal ileum as well as the cecum, ascending colon, and proximal transverse colon, with inflamed small bowel loops and right lower quadrant and surrounding mesenteric fat stranding and right hemiabdomen and prominent mesenteric lymph nodes; no discrete evidence of bowel obstruction, no abscess  - Patient was given Solu-Medrol 60 mg IV, will continue Solu-Medrol dosing  - Received Zosyn in the ED, continue with Cipro and Flagyl  - patient states he was started on Entyvio approximately 1 week ago  - Appreciate GI and general surgery consults

## 2020-07-23 NOTE — ED PROVIDER NOTES
History  Chief Complaint   Patient presents with    Abdominal Pain     abd pain since yesterday, hx of jarrod  feels like a flare up     23-year-old male with past history of Crohn's disease, asthma, presents to the ED with complaint of acute onset generalized, moderate to severe abdominal pain since yesterday  Patient denies any diarrhea or hematochezia  Patient has had associated nausea with his pain  Patient states that he is followed by GI and started on a new medication Gerald Rodriguez) for Crohn's about a week ago  Patient received his 1st infusion dose about a week ago  Patient came to the ED today as his abdominal pain is increasing  Patient denies any blood in his stool  History provided by:  Patient  Abdominal Pain   Associated symptoms: nausea    Associated symptoms: no chest pain, no cough, no diarrhea, no dysuria, no fatigue, no fever, no shortness of breath, no sore throat and no vomiting        Prior to Admission Medications   Prescriptions Last Dose Informant Patient Reported? Taking?   omeprazole (PriLOSEC) 40 MG capsule Past Week at Unknown time Self Yes Yes   Sig: Take 40 mg by mouth daily   predniSONE 5 mg tablet Past Week at Unknown time  No Yes   Si mg daily X 14 days, then 35 mg daily for 7 days, then 30 mg daily for 7 days, then 25 mg daily X 7 days, then 20 mg dailyX 7 days, then 15 mg daily X 7 days, then 10 mg daily X 7 days, then 5 mg daily X 7 days   vedolizumab (Entyvio) 300 MG SOLR   Yes Yes   Sig: Infuse 300 mg into a venous catheter once a week      Facility-Administered Medications: None       Past Medical History:   Diagnosis Date    Asthma     Crohn disease ([facility] [address] )        History reviewed  No pertinent surgical history  Family History   Problem Relation Age of Onset    Asthma Mother      I have reviewed and agree with the history as documented      E-Cigarette/Vaping    E-Cigarette Use Never User      E-Cigarette/Vaping Substances     Social History     Tobacco Use  Smoking status: Never Smoker    Smokeless tobacco: Never Used   Substance Use Topics    Alcohol use: Never     Frequency: Never    Drug use: Never       Review of Systems   Constitutional: Negative for activity change, fatigue and fever  HENT: Negative for congestion, ear discharge and sore throat  Eyes: Negative for pain and redness  Respiratory: Negative for cough, chest tightness, shortness of breath and wheezing  Cardiovascular: Negative for chest pain  Gastrointestinal: Positive for abdominal pain and nausea  Negative for diarrhea and vomiting  Endocrine: Negative for cold intolerance  Genitourinary: Negative for dysuria and urgency  Musculoskeletal: Negative for arthralgias and back pain  Neurological: Negative for dizziness, weakness and headaches  Psychiatric/Behavioral: Negative for agitation and behavioral problems  Physical Exam  Physical Exam   Constitutional: He is oriented to person, place, and time  He appears well-developed and well-nourished  HENT:   Head: Normocephalic and atraumatic  Nose: Nose normal    Mouth/Throat: Oropharynx is clear and moist    Eyes: Conjunctivae and EOM are normal    Neck: Normal range of motion  Neck supple  Cardiovascular: Normal rate, regular rhythm and normal heart sounds  Pulmonary/Chest: Effort normal and breath sounds normal    Abdominal: Soft  Bowel sounds are normal  He exhibits no distension  There is generalized tenderness  Abdomen is soft, nondistended, with bowel sounds present all 4 quadrant  Moderate, diffuse tenderness noted to palpation  Musculoskeletal: Normal range of motion  Neurological: He is alert and oriented to person, place, and time  Skin: Skin is warm  Psychiatric: He has a normal mood and affect  His behavior is normal  Judgment and thought content normal    Nursing note and vitals reviewed        Vital Signs  ED Triage Vitals [07/22/20 2041]   Temperature Pulse Respirations Blood Pressure SpO2   (!) 100 6 °F (38 1 °C) (!) 138 (!) 24 139/88 98 %      Temp Source Heart Rate Source Patient Position - Orthostatic VS BP Location FiO2 (%)   Tympanic Radial Sitting Right arm --      Pain Score       9           Vitals:    07/22/20 2041 07/22/20 2351   BP: 139/88 114/76   Pulse: (!) 138 (!) 114   Patient Position - Orthostatic VS: Sitting Sitting         Visual Acuity      ED Medications  Medications   piperacillin-tazobactam (ZOSYN) IVPB 3 375 g (3 375 g Intravenous New Bag 7/22/20 2351)   methylPREDNISolone sodium succinate (Solu-MEDROL) injection 60 mg (has no administration in time range)   acetaminophen (TYLENOL) rectal suppository 975 mg (has no administration in time range)   sodium chloride 0 9 % bolus 1,000 mL (0 mL Intravenous Stopped 7/22/20 2304)   morphine (PF) 4 mg/mL injection 4 mg (4 mg Intravenous Given 7/22/20 2148)   ondansetron (ZOFRAN) injection 4 mg (4 mg Intravenous Given 7/22/20 2147)   iohexol (OMNIPAQUE) 350 MG/ML injection (SINGLE-DOSE) 100 mL (100 mL Intravenous Given 7/22/20 2257)       Diagnostic Studies  Results Reviewed     Procedure Component Value Units Date/Time    [id number]     Lab Status:  No result Specimen:  Nares from Nose     Lactic acid [[id number]]  (Normal) Collected:  07/22/20 2056    Lab Status:  Final result Specimen:  Blood from Hand, Left Updated:  07/22/20 2123     LACTIC ACID 1 4 mmol/L     Narrative:       Result may be elevated if tourniquet was used during collection      Comprehensive metabolic panel [[id number]]  (Abnormal) Collected:  07/22/20 2052    Lab Status:  Final result Specimen:  Blood from Arm, Left Updated:  07/22/20 2120     Sodium 133 mmol/L      Potassium 4 6 mmol/L      Chloride 96 mmol/L      CO2 30 mmol/L      ANION GAP 7 mmol/L      BUN 16 mg/dL      Creatinine 1 24 mg/dL      Glucose 104 mg/dL      Calcium 9 6 mg/dL      AST 15 U/L      ALT 13 U/L      Alkaline Phosphatase 105 U/L      Total Protein 8 5 g/dL      Albumin 3 3 g/dL      Total Bilirubin 0 60 mg/dL      eGFR 93 ml/min/1 73sq m     Narrative:       National Kidney Disease Foundation guidelines for Chronic Kidney Disease (CKD):     Stage 1 with normal or high GFR (GFR > 90 mL/min/1 73 square meters)    Stage 2 Mild CKD (GFR = 60-89 mL/min/1 73 square meters)    Stage 3A Moderate CKD (GFR = 45-59 mL/min/1 73 square meters)    Stage 3B Moderate CKD (GFR = 30-44 mL/min/1 73 square meters)    Stage 4 Severe CKD (GFR = 15-29 mL/min/1 73 square meters)    Stage 5 End Stage CKD (GFR <15 mL/min/1 73 square meters)  Note: GFR calculation is accurate only with a steady state creatinine    Magnesium [[id number]]  (Normal) Collected:  07/22/20 2052    Lab Status:  Final result Specimen:  Blood from Arm, Left Updated:  07/22/20 2120     Magnesium 2 2 mg/dL     Lipase [[id number]]  (Normal) Collected:  07/22/20 2052    Lab Status:  Final result Specimen:  Blood from Arm, Left Updated:  07/22/20 2120     Lipase 159 u/L     Protime-INR [[id number]]  (Normal) Collected:  07/22/20 2052    Lab Status:  Final result Specimen:  Blood from Arm, Left Updated:  07/22/20 2116     Protime 14 4 seconds      INR 1 13    APTT [[id number]]  (Normal) Collected:  07/22/20 2052    Lab Status:  Final result Specimen:  Blood from Arm, Left Updated:  07/22/20 2116     PTT 30 seconds     CBC and differential [[id number]]  (Abnormal) Collected:  07/22/20 2052    Lab Status:  Final result Specimen:  Blood from Arm, Left Updated:  07/22/20 2103     WBC 11 71 Thousand/uL      RBC 5 71 Million/uL      Hemoglobin 16 4 g/dL      Hematocrit 50 2 %      MCV 88 fL      MCH 28 7 pg      MCHC 32 7 g/dL      RDW 13 8 %      MPV 8 6 fL      Platelets 312 Thousands/uL      nRBC 0 /100 WBCs      Neutrophils Relative 70 %      Immat GRANS % 1 %      Lymphocytes Relative 18 %      Monocytes Relative 10 %      Eosinophils Relative 0 %      Basophils Relative 1 %      Neutrophils Absolute 8 37 Thousands/µL      Immature Grans Absolute 0 07 Thousand/uL      Lymphocytes Absolute 2 05 Thousands/µL      Monocytes Absolute 1 13 Thousand/µL      Eosinophils Absolute 0 02 Thousand/µL      Basophils Absolute 0 07 Thousands/µL     Blood culture #2 [[id number]] Collected:  07/22/20 2056    Lab Status: In process Specimen:  Blood from Hand, Left Updated:  07/22/20 2102    Blood culture #1 [[id number]] Collected:  07/22/20 2052    Lab Status: In process Specimen:  Blood from Arm, Left Updated:  07/22/20 2101    UA w Reflex to Microscopic w Reflex to Culture [[id number]]     Lab Status:  No result Specimen:  Urine                  CT abdomen pelvis with contrast   Final Result by Jada Ricardo DO (07/22 2326)      Moderate wall thickening and hyperemia involving multiple loops of distal ileum as well as the cecum and ascending colon and the proximal transverse colon which correlates with the patient's history of Crohn's disease  There is a conglomeration of    inflamed small bowel loops in the right lower quadrant (axial image 42, series 2)  Surrounding mesenteric fat stranding in the right hemiabdomen as well as some prominent mesenteric lymph nodes and a small amount of fluid in the right hemiabdomen and    posterior pelvis, probably reactive  The findings have significantly intervally worsened when compared to prior  No discrete evidence of bowel obstruction  No abscess is seen  Other findings as above              Workstation performed: [id number]                    Procedures  CriticalCare Time  Performed by: Radha Casas DO  Authorized by: Radha Casas DO     Critical care provider statement:     Critical care time (minutes):  60    Critical care was necessary to treat or prevent imminent or life-threatening deterioration of the following conditions:  Sepsis    Critical care was time spent personally by me on the following activities:  Blood draw for specimens, obtaining history from patient or surrogate, development of treatment plan with patient or surrogate, discussions with consultants, discussions with primary provider, evaluation of patient's response to treatment, examination of patient, review of old charts, re-evaluation of patient's condition, ordering and review of radiographic studies, ordering and review of laboratory studies, ordering and performing treatments and interventions and interpretation of cardiac output measurements             ED Course       US AUDIT      Most Recent Value   Initial Alcohol Screen: US AUDIT-C    1  How often do you have a drink containing alcohol?  0 Filed at: 07/22/2020 2042   Audit-C Score  0 Filed at: 07/22/2020 2042                  RUTHIE/DAST-10      Most Recent Value   How many times in the past year have you    Used an illegal drug or used a prescription medication for non-medical reasons? Never Filed at: 07/22/2020 2042              Initial Sepsis Screening     Row Name 07/22/20 8440                Is the patient's history suggestive of a new or worsening infection? (!) Yes (Proceed)  -KF        Suspected source of infection  acute abdominal infection  -KF        Are two or more of the following signs & symptoms of infection both present and new to the patient? (!) Yes (Proceed)  -KF        Indicate SIRS criteria  Leukocytosis (WBC > 28302 IJL); Tachycardia > 90 bpm;Tachypnea > 20 resp per min  -KF        If the answer is yes to both questions, suspicion of sepsis is present          If severe sepsis is present AND tissue hypoperfusion perists in the hour after fluid resuscitation or lactate > 4, the patient meets criteria for SEPTIC SHOCK          Are any of the following organ dysfunction criteria present within 6 hours of suspected infection and SIRS criteria that are NOT considered to be chronic conditions?           Organ dysfunction          Date of presentation of severe sepsis          Time of presentation of severe sepsis   Tissue hypoperfusion persists in the hour after crystalloid fluid administration, evidenced, by either:          Was hypotension present within one hour of the conclusion of crystalloid fluid administration?         Date of presentation of septic shock          Time of presentation of septic shock            User Key  (r) = Recorded By, (t) = Taken By, (c) = Cosigned By    [facility] Name Provider Type    [address], DO Physician                        MDM  Number of Diagnoses or Management Options  Abdominal pain: new and requires workup  Crohn's colitis [facility]): new and requires workup  Sepsis [facility]): new and requires workup  Diagnosis management comments: Obtain septic workup as patient meets SIRS criteria, CT abdomen/pelvis  Give IV fluids, antiemetics, pain medication and reassess       Amount and/or Complexity of Data Reviewed  Clinical lab tests: ordered and reviewed  Tests in the radiology section of CPT®: ordered and reviewed  Tests in the medicine section of CPT®: ordered and reviewed  Review and summarize past medical records: yes  Independent visualization of images, tracings, or specimens: yes    Risk of Complications, Morbidity, and/or Mortality  General comments: Patient met sirs criteria in the ED with mild elevation in white count  No lactic acidosis noted  Patient's pain is controlled in the ED with morphine  CT scan showed concern for inflammation of bowel as well as prominent lymph nodes  Empiric antibiotic as well as steroids given and patient is admitted for further management  Patient agrees with admission plans      Patient Progress  Patient progress: improved        Disposition  Final diagnoses:   Sepsis ([address] )   Abdominal pain   Crohn's colitis ([address] )     Time reflects when diagnosis was documented in both MDM as applicable and the Disposition within this note     Time User Action Codes Description Comment    7/22/2020 11:42 PM Ingrid Slater [A41 9] Sepsis ([address] ) 7/22/2020 11:42 PM Adiel Rosales Add [R10 9] Abdominal pain     7/22/2020 11:42 PM Adiel Hodge [K50 10] Crohn's colitis [facility])       ED Disposition     ED Disposition Condition Date/Time Comment    Admit Stable Wed Jul 22, 2020 11:42 PM Case was discussed with NP for hospitalist and the patient's admission status was agreed to be Admission Status: inpatient status to the service of Dr Joy Liu  Follow-up Information    None         Patient's Medications   Discharge Prescriptions    No medications on file     No discharge procedures on file      PDMP Review     None          ED Provider  Electronically Signed by           Vee Horn DO  07/22/20 5653

## 2020-07-23 NOTE — PLAN OF CARE
Problem: Potential for Falls  Goal: Patient will remain free of falls  Description  INTERVENTIONS:  - Assess patient frequently for physical needs  -  Identify cognitive and physical deficits and behaviors that affect risk of falls  -  [place] fall precautions as indicated by assessment   - Educate patient/family on patient safety including physical limitations  - Instruct patient to call for assistance with activity based on assessment  - Modify environment to reduce risk of injury  - Consider OT/PT consult to assist with strengthening/mobility  Outcome: Progressing     Problem: PAIN - ADULT  Goal: Verbalizes/displays adequate comfort level or baseline comfort level  Description  Interventions:  - Encourage patient to monitor pain and request assistance  - Assess pain using appropriate pain scale  - Administer analgesics based on type and severity of pain and evaluate response  - Implement non-pharmacological measures as appropriate and evaluate response  - Consider cultural and social influences on pain and pain management  - Notify physician/advanced practitioner if interventions unsuccessful or patient reports new pain  Outcome: Progressing     Problem: INFECTION - ADULT  Goal: Absence or prevention of progression during hospitalization  Description  INTERVENTIONS:  - Assess and monitor for signs and symptoms of infection  - Monitor lab/diagnostic results  - Monitor all insertion sites, i e  indwelling lines  r  - Administer medications as ordered  - Instruct and encourage patient and family to use good hand hygiene technique     Outcome: Progressing  Goal: Absence of fever/infection during neutropenic period  Description  INTERVENTIONS:  - Monitor WBC    Outcome: Progressing     Problem: GASTROINTESTINAL - ADULT  Goal: Maintains or returns to baseline bowel function  Description  INTERVENTIONS:  - Assess bowel function  - Administer IV fluids if ordered to ensure adequate hydration  - Administer ordered medications as needed  - Encourage mobilization and activity  - Consider nutritional services referral to assist patient with adequate nutrition and appropriate food choices   Outcome: Progressing  Goal: Maintains adequate nutritional intake  Description  INTERVENTIONS:  - Monitor I&O, weight, and lab values if indicated  - Obtain nutrition services referral as needed   Outcome: Progressing

## 2020-07-24 LAB
ANION GAP SERPL CALCULATED.3IONS-SCNC: 6 MMOL/L (ref 4–13)
BASOPHILS # BLD MANUAL: 0 THOUSAND/UL (ref 0–0.1)
BASOPHILS NFR MAR MANUAL: 0 % (ref 0–1)
BUN SERPL-MCNC: 17 MG/DL (ref 5–25)
CALCIUM SERPL-MCNC: 8.6 MG/DL (ref 8.3–10.1)
CHLORIDE SERPL-SCNC: 103 MMOL/L (ref 100–108)
CO2 SERPL-SCNC: 28 MMOL/L (ref 21–32)
CREAT SERPL-MCNC: 1.05 MG/DL (ref 0.6–1.3)
EOSINOPHIL # BLD MANUAL: 0 THOUSAND/UL (ref 0–0.4)
EOSINOPHIL NFR BLD MANUAL: 0 % (ref 0–6)
ERYTHROCYTE [DISTWIDTH] IN BLOOD BY AUTOMATED COUNT: 13.6 % (ref 11.6–15.1)
GFR SERPL CREATININE-BSD FRML MDRD: 114 ML/MIN/1.73SQ M
GLUCOSE SERPL-MCNC: 136 MG/DL (ref 65–140)
HCT VFR BLD AUTO: 40.5 % (ref 36.5–49.3)
HGB BLD-MCNC: 14.3 G/DL (ref 12–17)
LYMPHOCYTES # BLD AUTO: 0.42 THOUSAND/UL (ref 0.6–4.47)
LYMPHOCYTES # BLD AUTO: 2 % (ref 14–44)
MCH RBC QN AUTO: 29.1 PG (ref 26.8–34.3)
MCHC RBC AUTO-ENTMCNC: 35.3 G/DL (ref 31.4–37.4)
MCV RBC AUTO: 83 FL (ref 82–98)
MONOCYTES # BLD AUTO: 0.21 THOUSAND/UL (ref 0–1.22)
MONOCYTES NFR BLD: 1 % (ref 4–12)
NEUTROPHILS # BLD MANUAL: 20.15 THOUSAND/UL (ref 1.85–7.62)
NEUTS BAND NFR BLD MANUAL: 19 % (ref 0–8)
NEUTS SEG NFR BLD AUTO: 78 % (ref 43–75)
PLATELET # BLD AUTO: 442 THOUSANDS/UL (ref 149–390)
PLATELET BLD QL SMEAR: ABNORMAL
PMV BLD AUTO: 9.2 FL (ref 8.9–12.7)
POTASSIUM SERPL-SCNC: 4.6 MMOL/L (ref 3.5–5.3)
RBC # BLD AUTO: 4.91 MILLION/UL (ref 3.88–5.62)
RBC MORPH BLD: NORMAL
SODIUM SERPL-SCNC: 137 MMOL/L (ref 136–145)
TOTAL CELLS COUNTED SPEC: 100
WBC # BLD AUTO: 20.77 THOUSAND/UL (ref 4.31–10.16)

## 2020-07-24 PROCEDURE — 99232 SBSQ HOSP IP/OBS MODERATE 35: CPT | Performed by: INTERNAL MEDICINE

## 2020-07-24 PROCEDURE — 85027 COMPLETE CBC AUTOMATED: CPT | Performed by: PHYSICIAN ASSISTANT

## 2020-07-24 PROCEDURE — 80048 BASIC METABOLIC PNL TOTAL CA: CPT | Performed by: PHYSICIAN ASSISTANT

## 2020-07-24 PROCEDURE — 99231 SBSQ HOSP IP/OBS SF/LOW 25: CPT | Performed by: INTERNAL MEDICINE

## 2020-07-24 PROCEDURE — 85007 BL SMEAR W/DIFF WBC COUNT: CPT | Performed by: PHYSICIAN ASSISTANT

## 2020-07-24 RX ORDER — PREDNISONE 20 MG/1
40 TABLET ORAL DAILY
Status: DISCONTINUED | OUTPATIENT
Start: 2020-07-24 | End: 2020-07-25 | Stop reason: HOSPADM

## 2020-07-24 RX ADMIN — CIPROFLOXACIN 400 MG: 2 INJECTION, SOLUTION INTRAVENOUS at 02:42

## 2020-07-24 RX ADMIN — METRONIDAZOLE 500 MG: 500 INJECTION, SOLUTION INTRAVENOUS at 17:33

## 2020-07-24 RX ADMIN — PREDNISONE 40 MG: 20 TABLET ORAL at 11:43

## 2020-07-24 RX ADMIN — METRONIDAZOLE 500 MG: 500 INJECTION, SOLUTION INTRAVENOUS at 01:50

## 2020-07-24 RX ADMIN — METHYLPREDNISOLONE SODIUM SUCCINATE 20 MG: 40 INJECTION, POWDER, FOR SOLUTION INTRAMUSCULAR; INTRAVENOUS at 01:50

## 2020-07-24 RX ADMIN — METRONIDAZOLE 500 MG: 500 INJECTION, SOLUTION INTRAVENOUS at 09:49

## 2020-07-24 RX ADMIN — METHYLPREDNISOLONE SODIUM SUCCINATE 20 MG: 40 INJECTION, POWDER, FOR SOLUTION INTRAMUSCULAR; INTRAVENOUS at 09:49

## 2020-07-24 RX ADMIN — SODIUM CHLORIDE 100 ML/HR: 0.9 INJECTION, SOLUTION INTRAVENOUS at 14:30

## 2020-07-24 RX ADMIN — SODIUM CHLORIDE 100 ML/HR: 0.9 INJECTION, SOLUTION INTRAVENOUS at 01:50

## 2020-07-24 RX ADMIN — PANTOPRAZOLE SODIUM 40 MG: 40 TABLET, DELAYED RELEASE ORAL at 05:26

## 2020-07-24 RX ADMIN — CIPROFLOXACIN 400 MG: 2 INJECTION, SOLUTION INTRAVENOUS at 14:30

## 2020-07-24 NOTE — PROGRESS NOTES
Progress Note - Elijah Seymour 1995, 26 y o  male MRN: [id number]    Unit/Bed#: [facility] Encounter: [id number]    Primary Care Provider: Maxine Spatz   Date and time admitted to hospital: 7/22/2020  8:42 PM        * Exacerbation of Crohn's disease [facility])  Assessment & Plan  Patient presented to the ED today with worsening generalized abdominal pain for the past 2 days  He has a history of Crohn's disease and has had multiple recent flares in the past few months  Patient underwent EGD/colonoscopy on 7/17 at [facility] in [place]  Requested medical records  - CT in the ED showed moderate wall thickening and hyperemia involving multiple loops of distal ileum as well as the cecum, ascending colon, and proximal transverse colon, with inflamed small bowel loops and right lower quadrant and surrounding mesenteric fat stranding and right hemiabdomen and prominent mesenteric lymph nodes; no discrete evidence of bowel obstruction, no abscess  Patient's GI outpatient is Dr Ivonne Kohli  D/w Dr Dseirae Kelly, and he was okay with Other group seeing the patient here  Patient is on ciprofloxacin and metronidazole continue the same, also on IV Solu-Medrol currently  Follow-up GI recommendations regarding switch of IV Solu-Medrol to p o  Prednisone  Sepsis without acute organ dysfunction [facility])  Assessment & Plan  POA, as evidenced by tachycardia, tachypnea, temp of 102 4°F, WBC 11 with concern for intraabdominal infection in the setting of Crohn's disease  WBC may also be mildly elevated in part due to prolonged steroid taper   - lactic acid 1 4  - blood cultures pending  - follow up procal  - Continue Abx as above  Resolving, on IV antibiotics,    Severe protein-calorie malnutrition ([facility] [address] )  Assessment & Plan   In the setting of Crohn's disease, patient has had multiple flares in the recent months and reports overall poor PO intake, poor appetite  BMI 16 97  Appreciate GI, nutrition consults    Degree of Malnutrition: Other severe protein calorie malnutrition  Body mass index is 16 97 kg/m²  Hyponatremia  Assessment & Plan  Sodium of 133 on admission  Patient reports poor PO intake for some time, but certainly worse in the past 2 days since his abdominal pain, nausea/poor appetite started  Support with IVFs while NPO  Repeat BMP in a m  Lab Results   Component Value Date    SODIUM 137 07/24/2020    K 4 6 07/24/2020     07/24/2020    CO2 28 07/24/2020    BUN 17 07/24/2020    CREATININE 1 05 07/24/2020    GLUC 136 07/24/2020    CALCIUM 8 6 07/24/2020   Mild hyponatremia is resolving  - this is secondary to dehydration  Subjective:   I have seen and Examined the patient at the bedside  No CP or Sob  No fevers or chills, No nausea or vomiting  Overnight events reviewed with the RN  No Other complains  Patient was started on a soft diet and tolerating slowly well  Abdominal pain is slightly better  No diarrhea  Objective: Wt Readings from Last 3 Encounters:   07/23/20 46 3 kg (102 lb)   03/23/20 58 1 kg (128 lb)     Temp Readings from Last 3 Encounters:   07/24/20 97 6 °F (36 4 °C)   07/14/20 97 8 °F (36 6 °C) (Temporal)   03/25/20 97 8 °F (36 6 °C)     BP Readings from Last 3 Encounters:   07/24/20 106/70   07/14/20 122/75   03/25/20 122/73     Pulse Readings from Last 3 Encounters:   07/24/20 81   07/14/20 100   03/25/20 65        Review of System:   Denies any CP or SOB  Denies any Cough or Cold  Denies any Fevers or chills  Denies any focal tingling numbness or weakness in any extremities  Denies any abdominal pain, Nausea or vomiting  Physical Exam:   General : Alert, Awake and oriented x 2-3, NAD  Cachectic  Bilateral temporal muscle wasting noted  Bilateral buccal fat loss noted  Neck : Supple  Eyes:  BERT, EOMI  CVS : S1, S2, RRR    R/S : Clear to auscultate anteriorly  Abd: Soft, NT, ND  Bs+ve  Extremity: Trace pedal edema noted  Skin: No acute Rash noted     CNS: No acute FND       Labs and Imaging Data Reviewed by me:   Lab Results   Component Value Date    WBC 20 77 (H) 07/24/2020    HGB 14 3 07/24/2020    HCT 40 5 07/24/2020    MCV 83 07/24/2020     (H) 07/24/2020      Lab Results   Component Value Date    SODIUM 137 07/24/2020    K 4 6 07/24/2020     07/24/2020    CO2 28 07/24/2020    BUN 17 07/24/2020    CREATININE 1 05 07/24/2020    GLUC 136 07/24/2020    CALCIUM 8 6 07/24/2020     No results found for: BNP   Lab Results   Component Value Date    INR 1 13 07/22/2020    INR 1 08 03/23/2020    PROTIME 14 4 07/22/2020    PROTIME 14 3 03/23/2020     Lab Results   Component Value Date    ALT 13 07/22/2020    AST 15 07/22/2020    ALKPHOS 105 07/22/2020

## 2020-07-24 NOTE — ASSESSMENT & PLAN NOTE
Sodium of 133 on admission  Patient reports poor PO intake for some time, but certainly worse in the past 2 days since his abdominal pain, nausea/poor appetite started  Support with IVFs while NPO  Repeat BMP in a m  Lab Results   Component Value Date    SODIUM 137 07/24/2020    K 4 6 07/24/2020     07/24/2020    CO2 28 07/24/2020    BUN 17 07/24/2020    CREATININE 1 05 07/24/2020    GLUC 136 07/24/2020    CALCIUM 8 6 07/24/2020   Mild hyponatremia is resolving  - this is secondary to dehydration

## 2020-07-24 NOTE — ASSESSMENT & PLAN NOTE
POA, as evidenced by tachycardia, tachypnea, temp of 102 4°F, WBC 11 with concern for intraabdominal infection in the setting of Crohn's disease   WBC may also be mildly elevated in part due to prolonged steroid taper   - lactic acid 1 4  - blood cultures pending  - follow up procal  - Continue Abx as above  Resolving, on IV antibiotics,

## 2020-07-24 NOTE — ASSESSMENT & PLAN NOTE
Patient presented to the ED today with worsening generalized abdominal pain for the past 2 days  He has a history of Crohn's disease and has had multiple recent flares in the past few months  Patient underwent EGD/colonoscopy on 7/17 at [facility] in [place]  Requested medical records  - CT in the ED showed moderate wall thickening and hyperemia involving multiple loops of distal ileum as well as the cecum, ascending colon, and proximal transverse colon, with inflamed small bowel loops and right lower quadrant and surrounding mesenteric fat stranding and right hemiabdomen and prominent mesenteric lymph nodes; no discrete evidence of bowel obstruction, no abscess  Patient's GI outpatient is Dr Haydee Winslow  D/w Dr Shaun Yun, and he was okay with Other group seeing the patient here  Patient is on ciprofloxacin and metronidazole continue the same, also on IV Solu-Medrol currently  Follow-up GI recommendations regarding switch of IV Solu-Medrol to p o  Prednisone

## 2020-07-24 NOTE — PROGRESS NOTES
Progress Note - Onofre Strickland 26 y o  male MRN: [id number]    Unit/Bed#: 2 [place] [address] Encounter: [id number]    Assessment and Plan:   Principal Problem:    Exacerbation of Crohn's disease ([place] [address] )  Active Problems:    Hyponatremia    Sepsis without acute organ dysfunction ([place] [address] )    Severe protein-calorie malnutrition ([place] [address] )    #1  Fistulizing Crohn's disease: CT shows inflammation throughout the small bowel and colon  Findings are worse than in March  - check C diff, stool enteric panel, fecal calprotectin if patient has a BM  - switch to oral steroids today, if doing well into tomorrow, can be d/c home for steroid taper starting at 40 mg for one week and tapering down by 5 mg weekly  - continue IV antibiotics (Cipro and Flagyl), can switch to PO to complete a 7 day course upon d/c   - diet as tolerated  - outpatient follow up with his GI and surgery at [facility]    ----------------------------------------------------------------------------------------------------------------    Subjective:     Improved today, ate well, got full quickly but no pain  No BM  No N/V  Pain improved compared to admission  Objective:     Vitals: Blood pressure 106/70, pulse 81, temperature 97 6 °F (36 4 °C), resp  rate 16, height 5' 5" (1 651 m), weight 46 3 kg (102 lb), SpO2 98 %  ,Body mass index is 16 97 kg/m²        Intake/Output Summary (Last 24 hours) at 7/24/2020 1103  Last data filed at 7/24/2020 0601  Gross per 24 hour   Intake 3820 ml   Output    Net 3820 ml       Physical Exam:     General Appearance: Alert, appears stated age and cooperative  Lungs: Clear to auscultation bilaterally, no rales or rhonchi, no labored breathing/accessory muscle use  Heart: Regular rate and rhythm, S1, S2 normal, no murmur, click, rub or gallop  Abdomen: Soft, non-tender, non-distended; bowel sounds normal; no masses or no organomegaly  Extremities: No cyanosis, clubbing, or edema    Invasive Devices     Peripheral Intravenous Line Peripheral IV 07/22/20 Left Antecubital 1 day                Lab Results:  Results from last 7 days   Lab Units 07/24/20  0800 07/23/20  0458   WBC Thousand/uL 20 77* 14 48*   HEMOGLOBIN g/dL 14 3 14 0   HEMATOCRIT % 40 5 42 2   PLATELETS Thousands/uL 442* 419*   NEUTROS PCT %  --  93*   LYMPHS PCT %  --  5*   LYMPHO PCT % 2*  --    MONOS PCT %  --  1*   MONO PCT % 1*  --    EOS PCT % 0 0     Results from last 7 days   Lab Units 07/24/20  0800 07/22/20 2052   POTASSIUM mmol/L 4 6   < > 4 6   CHLORIDE mmol/L 103   < > 96*   CO2 mmol/L 28   < > 30   BUN mg/dL 17   < > 16   CREATININE mg/dL 1 05   < > 1 24   CALCIUM mg/dL 8 6   < > 9 6   ALK PHOS U/L  --   --  105   ALT U/L  --   --  13   AST U/L  --   --  15    < > = values in this interval not displayed  Invalid input(s): BILI  Results from last 7 days   Lab Units 07/22/20 2052   INR  1 13     Results from last 7 days   Lab Units 07/22/20 2052   LIPASE u/L 159       Imaging Studies: I have personally reviewed pertinent imaging studies  Ct Abdomen Pelvis With Contrast    Result Date: 7/22/2020  Impression: Moderate wall thickening and hyperemia involving multiple loops of distal ileum as well as the cecum and ascending colon and the proximal transverse colon which correlates with the patient's history of Crohn's disease  There is a conglomeration of inflamed small bowel loops in the right lower quadrant (axial image 42, series 2)  Surrounding mesenteric fat stranding in the right hemiabdomen as well as some prominent mesenteric lymph nodes and a small amount of fluid in the right hemiabdomen and posterior pelvis, probably reactive  The findings have significantly intervally worsened when compared to prior  No discrete evidence of bowel obstruction  No abscess is seen  Other findings as above   Workstation performed: [id number]

## 2020-07-24 NOTE — ASSESSMENT & PLAN NOTE
In the setting of Crohn's disease, patient has had multiple flares in the recent months and reports overall poor PO intake, poor appetite  BMI 16 97  Appreciate GI, nutrition consults  Degree of Malnutrition: Other severe protein calorie malnutrition  Body mass index is 16 97 kg/m²

## 2020-07-24 NOTE — PLAN OF CARE
Problem: Potential for Falls  Goal: Patient will remain free of falls  Description  INTERVENTIONS:  - Assess patient frequently for physical needs  -  Identify cognitive and physical deficits and behaviors that affect risk of falls  -  [place] fall precautions as indicated by assessment   - Educate patient/family on patient safety including physical limitations  - Instruct patient to call for assistance with activity based on assessment  - Modify environment to reduce risk of injury  - Consider OT/PT consult to assist with strengthening/mobility  Outcome: Progressing     Problem: PAIN - ADULT  Goal: Verbalizes/displays adequate comfort level or baseline comfort level  Description  Interventions:  - Encourage patient to monitor pain and request assistance  - Assess pain using appropriate pain scale  - Administer analgesics based on type and severity of pain and evaluate response  - Implement non-pharmacological measures as appropriate and evaluate response  - Consider cultural and social influences on pain and pain management  - Notify physician/advanced practitioner if interventions unsuccessful or patient reports new pain  Outcome: Progressing     Problem: INFECTION - ADULT  Goal: Absence or prevention of progression during hospitalization  Description  INTERVENTIONS:  - Assess and monitor for signs and symptoms of infection  - Monitor lab/diagnostic results  - Monitor all insertion sites, i e  indwelling lines  r  - Administer medications as ordered  - Instruct and encourage patient and family to use good hand hygiene technique     Outcome: Progressing  Goal: Absence of fever/infection during neutropenic period  Description  INTERVENTIONS:  - Monitor WBC    Outcome: Progressing     Problem: GASTROINTESTINAL - ADULT  Goal: Maintains or returns to baseline bowel function  Description  INTERVENTIONS:  - Assess bowel function  - Administer IV fluids if ordered to ensure adequate hydration  - Administer ordered medications as needed  - Encourage mobilization and activity  - Consider nutritional services referral to assist patient with adequate nutrition and appropriate food choices   Outcome: Progressing  Goal: Maintains adequate nutritional intake  Description  INTERVENTIONS:  - Monitor I&O, weight, and lab values if indicated  - Obtain nutrition services referral as needed   Outcome: Progressing     Problem: Nutrition/Hydration-ADULT  Goal: Nutrient/Hydration intake appropriate for improving, restoring or maintaining nutritional needs  Description  Monitor and assess patient's nutrition/hydration status for malnutrition  Collaborate with interdisciplinary team and initiate plan and interventions as ordered  Monitor patient's weight and dietary intake as ordered or per policy  Utilize nutrition screening tool and intervene as necessary  Determine patient's food preferences and provide high-protein, high-caloric foods as appropriate       INTERVENTIONS:  - Monitor oral intake, urinary output, labs, and treatment plans  - Assess nutrition and hydration status and recommend course of action  - Evaluate amount of meals eaten  - Assist patient with eating if necessary   - Allow adequate time for meals  - Recommend/ encourage appropriate diets, oral nutritional supplements, and vitamin/mineral supplements  - Order, calculate, and assess calorie counts as needed  - Recommend, monitor, and adjust tube feedings and TPN/PPN based on assessed needs  - Assess need for intravenous fluids  - Provide specific nutrition/hydration education as appropriate  - Include patient/family/caregiver in decisions related to nutrition  Outcome: Progressing

## 2020-07-24 NOTE — QUICK NOTE
No indication for acute surgical intervention  Surgery team no longer needs to follow in this patient's care   Highly recommend patient follow up with surgeon who specializes in Crohn's disease at [facility]

## 2020-07-25 VITALS
RESPIRATION RATE: 16 BRPM | HEART RATE: 88 BPM | HEIGHT: 65 IN | BODY MASS INDEX: 17 KG/M2 | SYSTOLIC BLOOD PRESSURE: 127 MMHG | TEMPERATURE: 97.3 F | WEIGHT: 102 LBS | DIASTOLIC BLOOD PRESSURE: 89 MMHG | OXYGEN SATURATION: 98 %

## 2020-07-25 LAB
C DIFF TOX A+B STL QL IA: NEGATIVE
C DIFF TOX GENS STL QL NAA+PROBE: POSITIVE
CAMPYLOBACTER DNA SPEC NAA+PROBE: NORMAL
SALMONELLA DNA SPEC QL NAA+PROBE: NORMAL
SHIGA TOXIN STX GENE SPEC NAA+PROBE: NORMAL
SHIGELLA DNA SPEC QL NAA+PROBE: NORMAL

## 2020-07-25 PROCEDURE — 87505 NFCT AGENT DETECTION GI: CPT | Performed by: INTERNAL MEDICINE

## 2020-07-25 PROCEDURE — 87493 C DIFF AMPLIFIED PROBE: CPT | Performed by: INTERNAL MEDICINE

## 2020-07-25 PROCEDURE — 99239 HOSP IP/OBS DSCHRG MGMT >30: CPT | Performed by: INTERNAL MEDICINE

## 2020-07-25 PROCEDURE — 83993 ASSAY FOR CALPROTECTIN FECAL: CPT | Performed by: INTERNAL MEDICINE

## 2020-07-25 RX ORDER — PREDNISONE 20 MG/1
40 TABLET ORAL DAILY
Qty: 60 TABLET | Refills: 0 | Status: SHIPPED | OUTPATIENT
Start: 2020-07-26 | End: 2020-08-14 | Stop reason: HOSPADM

## 2020-07-25 RX ORDER — METRONIDAZOLE 500 MG/1
500 TABLET ORAL EVERY 8 HOURS SCHEDULED
Qty: 15 TABLET | Refills: 0 | Status: SHIPPED | OUTPATIENT
Start: 2020-07-25 | End: 2020-07-30

## 2020-07-25 RX ORDER — ACETAMINOPHEN 325 MG/1
650 TABLET ORAL EVERY 6 HOURS PRN
Qty: 30 TABLET | Refills: 0 | Status: ON HOLD | OUTPATIENT
Start: 2020-07-25 | End: 2020-09-17 | Stop reason: ALTCHOICE

## 2020-07-25 RX ADMIN — PREDNISONE 40 MG: 20 TABLET ORAL at 09:41

## 2020-07-25 RX ADMIN — PANTOPRAZOLE SODIUM 40 MG: 40 TABLET, DELAYED RELEASE ORAL at 05:14

## 2020-07-25 RX ADMIN — METRONIDAZOLE 500 MG: 500 INJECTION, SOLUTION INTRAVENOUS at 09:41

## 2020-07-25 RX ADMIN — METRONIDAZOLE 500 MG: 500 INJECTION, SOLUTION INTRAVENOUS at 02:10

## 2020-07-25 RX ADMIN — CIPROFLOXACIN 400 MG: 2 INJECTION, SOLUTION INTRAVENOUS at 02:59

## 2020-07-25 NOTE — PLAN OF CARE
Problem: Potential for Falls  Goal: Patient will remain free of falls  Description  INTERVENTIONS:  - Assess patient frequently for physical needs  -  Identify cognitive and physical deficits and behaviors that affect risk of falls  -  [place] fall precautions as indicated by assessment   - Educate patient/family on patient safety including physical limitations  - Instruct patient to call for assistance with activity based on assessment  - Modify environment to reduce risk of injury  - Consider OT/PT consult to assist with strengthening/mobility  Outcome: Progressing     Problem: PAIN - ADULT  Goal: Verbalizes/displays adequate comfort level or baseline comfort level  Description  Interventions:  - Encourage patient to monitor pain and request assistance  - Assess pain using appropriate pain scale  - Administer analgesics based on type and severity of pain and evaluate response  - Implement non-pharmacological measures as appropriate and evaluate response  - Consider cultural and social influences on pain and pain management  - Notify physician/advanced practitioner if interventions unsuccessful or patient reports new pain  Outcome: Progressing - Patient has no complaints of pain at this time  Problem: INFECTION - ADULT  Goal: Absence or prevention of progression during hospitalization  Description  INTERVENTIONS:  - Assess and monitor for signs and symptoms of infection  - Monitor lab/diagnostic results  - Monitor all insertion sites, i e  indwelling lines    - Administer medications as ordered  - Instruct and encourage patient and family to use good hand hygiene technique     Outcome: Progressing  Goal: Absence of fever/infection during neutropenic period  Description  INTERVENTIONS:  - Monitor WBC    Outcome: Progressing     Problem: GASTROINTESTINAL - ADULT  Goal: Maintains or returns to baseline bowel function  Description  INTERVENTIONS:  - Assess bowel function  - Administer IV fluids if ordered to ensure adequate hydration  - Administer ordered medications as needed  - Encourage mobilization and activity  - Consider nutritional services referral to assist patient with adequate nutrition and appropriate food choices   Outcome: Progressing  Goal: Maintains adequate nutritional intake  Description  INTERVENTIONS:  - Monitor I&O, weight, and lab values if indicated  - Obtain nutrition services referral as needed   Outcome: Progressing     Problem: Nutrition/Hydration-ADULT  Goal: Nutrient/Hydration intake appropriate for improving, restoring or maintaining nutritional needs  Description  Monitor and assess patient's nutrition/hydration status for malnutrition  Collaborate with interdisciplinary team and initiate plan and interventions as ordered  Monitor patient's weight and dietary intake as ordered or per policy  Utilize nutrition screening tool and intervene as necessary  Determine patient's food preferences and provide high-protein, high-caloric foods as appropriate       INTERVENTIONS:  - Monitor oral intake, urinary output, labs, and treatment plans  - Assess nutrition and hydration status and recommend course of action  - Evaluate amount of meals eaten  - Assist patient with eating if necessary   - Allow adequate time for meals  - Recommend/ encourage appropriate diets, oral nutritional supplements, and vitamin/mineral supplements  - Order, calculate, and assess calorie counts as needed  - Recommend, monitor, and adjust tube feedings and TPN/PPN based on assessed needs  - Assess need for intravenous fluids  - Provide specific nutrition/hydration education as appropriate  - Include patient/family/caregiver in decisions related to nutrition  Outcome: Progressing

## 2020-07-25 NOTE — PLAN OF CARE
Problem: Potential for Falls  Goal: Patient will remain free of falls  Description  INTERVENTIONS:  - Assess patient frequently for physical needs  -  Identify cognitive and physical deficits and behaviors that affect risk of falls  -  [place] fall precautions as indicated by assessment   - Educate patient/family on patient safety including physical limitations  - Instruct patient to call for assistance with activity based on assessment  - Modify environment to reduce risk of injury  - Consider OT/PT consult to assist with strengthening/mobility  Outcome: Adequate for Discharge     Problem: PAIN - ADULT  Goal: Verbalizes/displays adequate comfort level or baseline comfort level  Description  Interventions:  - Encourage patient to monitor pain and request assistance  - Assess pain using appropriate pain scale  - Administer analgesics based on type and severity of pain and evaluate response  - Implement non-pharmacological measures as appropriate and evaluate response  - Consider cultural and social influences on pain and pain management  - Notify physician/advanced practitioner if interventions unsuccessful or patient reports new pain  Outcome: Adequate for Discharge     Problem: INFECTION - ADULT  Goal: Absence or prevention of progression during hospitalization  Description  INTERVENTIONS:  - Assess and monitor for signs and symptoms of infection  - Monitor lab/diagnostic results  - Monitor all insertion sites, i e  indwelling lines  r  - Administer medications as ordered  - Instruct and encourage patient and family to use good hand hygiene technique     Outcome: Adequate for Discharge  Goal: Absence of fever/infection during neutropenic period  Description  INTERVENTIONS:  - Monitor WBC    Outcome: Adequate for Discharge     Problem: GASTROINTESTINAL - ADULT  Goal: Maintains or returns to baseline bowel function  Description  INTERVENTIONS:  - Assess bowel function  - Administer IV fluids if ordered to ensure adequate hydration  - Administer ordered medications as needed  - Encourage mobilization and activity  - Consider nutritional services referral to assist patient with adequate nutrition and appropriate food choices   Outcome: Adequate for Discharge  Goal: Maintains adequate nutritional intake  Description  INTERVENTIONS:  - Monitor I&O, weight, and lab values if indicated  - Obtain nutrition services referral as needed   Outcome: Adequate for Discharge     Problem: Nutrition/Hydration-ADULT  Goal: Nutrient/Hydration intake appropriate for improving, restoring or maintaining nutritional needs  Description  Monitor and assess patient's nutrition/hydration status for malnutrition  Collaborate with interdisciplinary team and initiate plan and interventions as ordered  Monitor patient's weight and dietary intake as ordered or per policy  Utilize nutrition screening tool and intervene as necessary  Determine patient's food preferences and provide high-protein, high-caloric foods as appropriate       INTERVENTIONS:  - Monitor oral intake, urinary output, labs, and treatment plans  - Assess nutrition and hydration status and recommend course of action  - Evaluate amount of meals eaten  - Assist patient with eating if necessary   - Allow adequate time for meals  - Recommend/ encourage appropriate diets, oral nutritional supplements, and vitamin/mineral supplements  - Order, calculate, and assess calorie counts as needed  - Recommend, monitor, and adjust tube feedings and TPN/PPN based on assessed needs  - Assess need for intravenous fluids  - Provide specific nutrition/hydration education as appropriate  - Include patient/family/caregiver in decisions related to nutrition  Outcome: Adequate for Discharge

## 2020-07-25 NOTE — NURSING NOTE
Pt left via walking with a steady gait accompanied by this RN  IV dc and intact  Discharge instructions reviewed with pt  Prescriptions called to pharmacy  Up to date with immunizations  Non-smoker  No further questions at this time

## 2020-07-25 NOTE — DISCHARGE SUMMARY
Discharge Summary - [address] [facility]    Patient Information: Mc Weber 26 y o  male MRN: [id number]  Unit/Bed#: [address] [place] [address] Encounter: [id number]    Discharging Physician / Practitioner: Alok Bonner MD  PCP: Nita Jones  Admission Date: 7/22/2020  Discharge Date: 07/25/20    Reason for Admission: Abdominal Pain (abd pain since yesterday, hx of chrohns  feels like a flare up)      Discharge Diagnoses:     Principal Problem:    Exacerbation of Crohn's disease ([facility] )  Active Problems:    Sepsis without acute organ dysfunction ([facility] )    Severe protein-calorie malnutrition ([facility] )    Hyponatremia  Resolved Problems:    * No resolved hospital problems  *        No new Assessment & Plan notes have been filed under this hospital service since the last note was generated  Service: Hospitalist      Consultations During Hospital Stay:  IP CONSULT TO NUTRITION SERVICES  IP CONSULT TO GASTROENTEROLOGY  IP CONSULT TO ACUTE CARE SURGERY    Procedures Performed:     · None    Significant Findings:     · Refer to hospital course and above listed diagnosis related plan for details   Moderate wall thickening and hyperemia involving multiple loops of distal ileum as well as the cecum and ascending colon and the proximal transverse colon which correlates with the patient's history of Crohn's disease  On CT/AP  Imaging while in hospital:      Incidental Findings:   · None     Test Results Pending at Discharge (will require follow up): None  Outpatient Tests Requested:  · None    Complications:  Refer to hospital course and above listed diagnosis related plan, if any    Hospital Course:     Mc Weber is a 26 y o  male patient who originally presented to the hospital on 7/22/2020 due to Abd pain and diarrhea  Patient was febrile in the ER, also had a CT scan of the abdomen pelvis which showed thickening in the colon and small bowel region and suggestive of Crohn's disease flare up    Patient was admitted for IV fluid resuscitation and IV antibiotics  He was also treated some IV pain meds and antiemetics as needed  Patient is abdominal pain gradually resolved  Patient was NPO for state, foramina clear liquid diet  Patient was then advanced to soft diet  Patient was seen by GI and patient initially was on IV Solu-Medrol for couple days followed by which she was transition to oral steroids  Patient was stable on oral steroids and was and recommended to be discharged on prednisone 40 mg daily  He was recently tapered to-30 mg of prednisone however be was recommended that the patient go back on 40 mg daily  He should follow-up with his GI specialist Dr Rachel Garcia is an outpatient and Keppra dose accordingly  Will also finish 5 more day course of Flagyl  Please see above list of diagnoses and related plan for additional information  Condition at Discharge: fair     Discharge Day Visit / Exam:     Subjective:  I have seen and examined the patient at bedside  Overnight events reviewed with the RN  Vitals: Blood Pressure: 127/89 (07/25/20 0945)  Pulse: 88 (07/25/20 0945)  Temperature: (!) 97 3 °F (36 3 °C) (07/25/20 0945)  Temp Source: Oral (07/24/20 0740)  Respirations: 18 (07/24/20 2258)  Height: 5' 5" (165 1 cm) (07/23/20 0214)  Weight - Scale: 46 3 kg (102 lb) (07/23/20 0214)  SpO2: 98 % (07/25/20 0945)  Exam:   Physical Exam   General Exam: Alert and Oriented x 3, NAD  Eyes: BERT  Neck: Supple  CVS: S1, S2 [place], RRR    R/S: Clear to auscultate anteriorly  Abd: Soft, NT, ND, BS+ve  Extremities: No edema noted  Skin: No acute Rash noted  CNS: No acute FND  Moves all 4 extremities  Psych: Co-operative, Not agitated  Discharge instructions/Information to patient and family:(Discharge Medications and Follow up):   See after visit summary for information provided to patient and family        Provisions for Follow-Up Care:  See after visit summary for information related to follow-up care and any pertinent home health orders  Disposition: Home    Planned Readmission:  No     Discharge Statement:  I spent 35 minutes discharging the patient  This time was spent on the day of discharge  I had direct contact with the patient on the day of discharge  Greater than 50% of the total time was spent examining patient, answering all patient questions, arranging and discussing plan of care with patient as well as directly providing post-discharge instructions  Additional time then spent on discharge activities  Discharge Medications:  See after visit summary for reconciled discharge medications provided to patient and family  ** Please Note: "This note has been constructed using a voice recognition system  Therefore there may be syntax, spelling, and/or grammatical errors   Please call if you have any questions  "**

## 2020-07-27 NOTE — UTILIZATION REVIEW
Notification of Discharge  This is a Notification of Discharge from our facility [address]  Please be advised that this patient has been discharge from our facility  Below you will find the admission and discharge date and time including the patients disposition  PRESENTATION DATE: 7/22/2020  8:42 PM  OBS ADMISSION DATE:   IP ADMISSION DATE: 7/22/20 2343   DISCHARGE DATE: 7/25/2020 12:52 PM  DISPOSITION: Home/Self Care Home/Self Care   Admission Orders listed below:  Admission Orders (From admission, onward)     Ordered        07/22/20 2343  Inpatient Admission  Once                   Please contact the UR Department if additional information is required to close this patient's authorization/case  [facility] Utilization Review Department  Main: [phone] x carefully listen to the prompts  All voicemails are confidential   [email or web address]  Send all requests for admission clinical reviews, approved or denied determinations and any other requests to dedicated fax number below belonging to the campus where the patient is receiving treatment   List of dedicated fax numbers:  [address] DENIALS (Administrative/Medical Necessity) [phone]   [address] (Maternity/NICU/Pediatrics) [phone]   Rai Figueroa [phone]     [address] [phone]   Grisel Foster [phone]   Anita Stahl[address] [phone]   [facility]  [phone]   [address] [facility] [address]  [address] [address] [facility] [phone]

## 2020-07-27 NOTE — PROGRESS NOTES
DR Amari Lugo OFFICE CALLED SPOKE WITH Ondina Hoskins RN  OKAY FOR PATIENT TO GET HIS TREATMENT TOMORROW

## 2020-07-28 ENCOUNTER — HOSPITAL ENCOUNTER (OUTPATIENT)
Dept: INFUSION CENTER | Facility: HOSPITAL | Age: 25
Discharge: HOME | End: 2020-07-28
Payer: COMMERCIAL

## 2020-07-28 VITALS
HEART RATE: 108 BPM | OXYGEN SATURATION: 99 % | SYSTOLIC BLOOD PRESSURE: 120 MMHG | RESPIRATION RATE: 16 BRPM | DIASTOLIC BLOOD PRESSURE: 69 MMHG | TEMPERATURE: 98.5 F

## 2020-07-28 LAB
BACTERIA BLD CULT: NORMAL
BACTERIA BLD CULT: NORMAL

## 2020-07-28 PROCEDURE — 96413 CHEMO IV INFUSION 1 HR: CPT

## 2020-07-28 RX ADMIN — VEDOLIZUMAB 300 MG: 300 INJECTION, POWDER, LYOPHILIZED, FOR SOLUTION INTRAVENOUS at 13:35

## 2020-07-30 ENCOUNTER — HOSPITAL ENCOUNTER (OUTPATIENT)
Dept: RADIOLOGY | Facility: HOSPITAL | Age: 25
Discharge: HOME | End: 2020-07-30
Attending: INTERNAL MEDICINE
Payer: COMMERCIAL

## 2020-07-30 DIAGNOSIS — K50.919 CROHN'S DISEASE WITH COMPLICATION, UNSPECIFIED GASTROINTESTINAL TRACT LOCATION (HCC): ICD-10-CM

## 2020-07-30 PROCEDURE — 74250 X-RAY XM SM INT 1CNTRST STD: CPT

## 2020-07-31 LAB — CALPROTECTIN STL-MCNT: 919 UG/G (ref 0–120)

## 2020-08-12 ENCOUNTER — TRANSCRIBE ORDERS (OUTPATIENT)
Dept: ADMINISTRATIVE | Facility: HOSPITAL | Age: 25
End: 2020-08-12

## 2020-08-12 DIAGNOSIS — R10.32 ABDOMINAL PAIN, LLQ: Primary | ICD-10-CM

## 2020-08-13 ENCOUNTER — HOSPITAL ENCOUNTER (INPATIENT)
Facility: HOSPITAL | Age: 25
LOS: 1 days | Discharge: HOME | DRG: 386 | End: 2020-08-14
Attending: EMERGENCY MEDICINE | Admitting: FAMILY MEDICINE
Payer: COMMERCIAL

## 2020-08-13 ENCOUNTER — APPOINTMENT (EMERGENCY)
Dept: RADIOLOGY | Facility: HOSPITAL | Age: 25
DRG: 386 | End: 2020-08-13
Payer: COMMERCIAL

## 2020-08-13 DIAGNOSIS — K50.90 EXACERBATION OF CROHN'S DISEASE WITHOUT COMPLICATION (HCC): ICD-10-CM

## 2020-08-13 DIAGNOSIS — K50.90 EXACERBATION OF CROHN'S DISEASE (HCC): ICD-10-CM

## 2020-08-13 DIAGNOSIS — K50.10 CROHN'S COLITIS (HCC): ICD-10-CM

## 2020-08-13 DIAGNOSIS — K52.9 ENTERITIS: ICD-10-CM

## 2020-08-13 DIAGNOSIS — R10.9 ABDOMINAL PAIN: Primary | ICD-10-CM

## 2020-08-13 DIAGNOSIS — D72.829 LEUKOCYTOSIS: ICD-10-CM

## 2020-08-13 DIAGNOSIS — A04.72 C. DIFFICILE COLITIS: ICD-10-CM

## 2020-08-13 DIAGNOSIS — K52.9 COLITIS: ICD-10-CM

## 2020-08-13 LAB
ALBUMIN SERPL BCP-MCNC: 3.2 G/DL (ref 3.5–5)
ALP SERPL-CCNC: 107 U/L (ref 46–116)
ALT SERPL W P-5'-P-CCNC: 13 U/L (ref 12–78)
ANION GAP SERPL CALCULATED.3IONS-SCNC: 7 MMOL/L (ref 4–13)
AST SERPL W P-5'-P-CCNC: 16 U/L (ref 5–45)
BASOPHILS # BLD AUTO: 0.08 THOUSANDS/ΜL (ref 0–0.1)
BASOPHILS NFR BLD AUTO: 1 % (ref 0–1)
BILIRUB SERPL-MCNC: 0.4 MG/DL (ref 0.2–1)
BILIRUB UR QL STRIP: NEGATIVE
BUN SERPL-MCNC: 15 MG/DL (ref 5–25)
CALCIUM SERPL-MCNC: 9.1 MG/DL (ref 8.3–10.1)
CHLORIDE SERPL-SCNC: 101 MMOL/L (ref 100–108)
CLARITY UR: CLEAR
CO2 SERPL-SCNC: 32 MMOL/L (ref 21–32)
COLOR UR: YELLOW
CREAT SERPL-MCNC: 1 MG/DL (ref 0.6–1.3)
CRP SERPL QL: 52.3 MG/L
EOSINOPHIL # BLD AUTO: 0.01 THOUSAND/ΜL (ref 0–0.61)
EOSINOPHIL NFR BLD AUTO: 0 % (ref 0–6)
ERYTHROCYTE [DISTWIDTH] IN BLOOD BY AUTOMATED COUNT: 15.4 % (ref 11.6–15.1)
ERYTHROCYTE [SEDIMENTATION RATE] IN BLOOD: 16 MM/HOUR (ref 0–14)
GFR SERPL CREATININE-BSD FRML MDRD: 121 ML/MIN/1.73SQ M
GLUCOSE SERPL-MCNC: 90 MG/DL (ref 65–140)
GLUCOSE UR STRIP-MCNC: NEGATIVE MG/DL
HCT VFR BLD AUTO: 41.7 % (ref 36.5–49.3)
HGB BLD-MCNC: 13.7 G/DL (ref 12–17)
HGB UR QL STRIP.AUTO: NEGATIVE
IMM GRANULOCYTES # BLD AUTO: 0.32 THOUSAND/UL (ref 0–0.2)
IMM GRANULOCYTES NFR BLD AUTO: 2 % (ref 0–2)
KETONES UR STRIP-MCNC: NEGATIVE MG/DL
LACTATE SERPL-SCNC: 1.3 MMOL/L (ref 0.5–2)
LEUKOCYTE ESTERASE UR QL STRIP: NEGATIVE
LIPASE SERPL-CCNC: 167 U/L (ref 73–393)
LYMPHOCYTES # BLD AUTO: 3.5 THOUSANDS/ΜL (ref 0.6–4.47)
LYMPHOCYTES NFR BLD AUTO: 24 % (ref 14–44)
MCH RBC QN AUTO: 29.7 PG (ref 26.8–34.3)
MCHC RBC AUTO-ENTMCNC: 32.9 G/DL (ref 31.4–37.4)
MCV RBC AUTO: 90 FL (ref 82–98)
MONOCYTES # BLD AUTO: 1.14 THOUSAND/ΜL (ref 0.17–1.22)
MONOCYTES NFR BLD AUTO: 8 % (ref 4–12)
NEUTROPHILS # BLD AUTO: 9.48 THOUSANDS/ΜL (ref 1.85–7.62)
NEUTS SEG NFR BLD AUTO: 65 % (ref 43–75)
NITRITE UR QL STRIP: NEGATIVE
NRBC BLD AUTO-RTO: 0 /100 WBCS
PH UR STRIP.AUTO: 7 [PH]
PLATELET # BLD AUTO: 416 THOUSANDS/UL (ref 149–390)
PMV BLD AUTO: 8.9 FL (ref 8.9–12.7)
POTASSIUM SERPL-SCNC: 4.1 MMOL/L (ref 3.5–5.3)
PROT SERPL-MCNC: 7.8 G/DL (ref 6.4–8.2)
PROT UR STRIP-MCNC: NEGATIVE MG/DL
RBC # BLD AUTO: 4.62 MILLION/UL (ref 3.88–5.62)
SODIUM SERPL-SCNC: 140 MMOL/L (ref 136–145)
SP GR UR STRIP.AUTO: 1.01 (ref 1–1.03)
UROBILINOGEN UR QL STRIP.AUTO: 0.2 E.U./DL
WBC # BLD AUTO: 14.53 THOUSAND/UL (ref 4.31–10.16)

## 2020-08-13 PROCEDURE — 85652 RBC SED RATE AUTOMATED: CPT | Performed by: EMERGENCY MEDICINE

## 2020-08-13 PROCEDURE — 83690 ASSAY OF LIPASE: CPT | Performed by: EMERGENCY MEDICINE

## 2020-08-13 PROCEDURE — 86140 C-REACTIVE PROTEIN: CPT | Performed by: EMERGENCY MEDICINE

## 2020-08-13 PROCEDURE — 87040 BLOOD CULTURE FOR BACTERIA: CPT | Performed by: EMERGENCY MEDICINE

## 2020-08-13 PROCEDURE — 96361 HYDRATE IV INFUSION ADD-ON: CPT

## 2020-08-13 PROCEDURE — 99285 EMERGENCY DEPT VISIT HI MDM: CPT

## 2020-08-13 PROCEDURE — 99285 EMERGENCY DEPT VISIT HI MDM: CPT | Performed by: EMERGENCY MEDICINE

## 2020-08-13 PROCEDURE — 80053 COMPREHEN METABOLIC PANEL: CPT | Performed by: EMERGENCY MEDICINE

## 2020-08-13 PROCEDURE — 36415 COLL VENOUS BLD VENIPUNCTURE: CPT | Performed by: EMERGENCY MEDICINE

## 2020-08-13 PROCEDURE — 81003 URINALYSIS AUTO W/O SCOPE: CPT | Performed by: PHYSICIAN ASSISTANT

## 2020-08-13 PROCEDURE — 99223 1ST HOSP IP/OBS HIGH 75: CPT | Performed by: INTERNAL MEDICINE

## 2020-08-13 PROCEDURE — 96374 THER/PROPH/DIAG INJ IV PUSH: CPT

## 2020-08-13 PROCEDURE — 74177 CT ABD & PELVIS W/CONTRAST: CPT

## 2020-08-13 PROCEDURE — 99222 1ST HOSP IP/OBS MODERATE 55: CPT | Performed by: FAMILY MEDICINE

## 2020-08-13 PROCEDURE — 83605 ASSAY OF LACTIC ACID: CPT | Performed by: EMERGENCY MEDICINE

## 2020-08-13 PROCEDURE — G1004 CDSM NDSC: HCPCS

## 2020-08-13 PROCEDURE — 85025 COMPLETE CBC W/AUTO DIFF WBC: CPT | Performed by: EMERGENCY MEDICINE

## 2020-08-13 RX ORDER — PANTOPRAZOLE SODIUM 40 MG/1
40 TABLET, DELAYED RELEASE ORAL
Status: DISCONTINUED | OUTPATIENT
Start: 2020-08-13 | End: 2020-08-14 | Stop reason: HOSPADM

## 2020-08-13 RX ORDER — ONDANSETRON 2 MG/ML
4 INJECTION INTRAMUSCULAR; INTRAVENOUS EVERY 6 HOURS PRN
Status: DISCONTINUED | OUTPATIENT
Start: 2020-08-13 | End: 2020-08-14 | Stop reason: HOSPADM

## 2020-08-13 RX ORDER — CIPROFLOXACIN 2 MG/ML
400 INJECTION, SOLUTION INTRAVENOUS EVERY 12 HOURS
Status: DISCONTINUED | OUTPATIENT
Start: 2020-08-13 | End: 2020-08-14

## 2020-08-13 RX ORDER — SODIUM CHLORIDE 9 MG/ML
75 INJECTION, SOLUTION INTRAVENOUS CONTINUOUS
Status: DISCONTINUED | OUTPATIENT
Start: 2020-08-13 | End: 2020-08-14 | Stop reason: HOSPADM

## 2020-08-13 RX ORDER — CIPROFLOXACIN 2 MG/ML
400 INJECTION, SOLUTION INTRAVENOUS ONCE
Status: COMPLETED | OUTPATIENT
Start: 2020-08-13 | End: 2020-08-13

## 2020-08-13 RX ORDER — METHYLPREDNISOLONE SODIUM SUCCINATE 40 MG/ML
20 INJECTION, POWDER, LYOPHILIZED, FOR SOLUTION INTRAMUSCULAR; INTRAVENOUS EVERY 8 HOURS SCHEDULED
Status: DISCONTINUED | OUTPATIENT
Start: 2020-08-13 | End: 2020-08-14 | Stop reason: HOSPADM

## 2020-08-13 RX ORDER — ACETAMINOPHEN 325 MG/1
650 TABLET ORAL EVERY 6 HOURS PRN
Status: DISCONTINUED | OUTPATIENT
Start: 2020-08-13 | End: 2020-08-14 | Stop reason: HOSPADM

## 2020-08-13 RX ORDER — DICYCLOMINE HYDROCHLORIDE 10 MG/1
20 CAPSULE ORAL
Status: DISCONTINUED | OUTPATIENT
Start: 2020-08-13 | End: 2020-08-14 | Stop reason: HOSPADM

## 2020-08-13 RX ORDER — MORPHINE SULFATE 4 MG/ML
4 INJECTION, SOLUTION INTRAMUSCULAR; INTRAVENOUS ONCE
Status: COMPLETED | OUTPATIENT
Start: 2020-08-13 | End: 2020-08-13

## 2020-08-13 RX ADMIN — SODIUM CHLORIDE 1000 ML: 0.9 INJECTION, SOLUTION INTRAVENOUS at 02:53

## 2020-08-13 RX ADMIN — DICYCLOMINE HYDROCHLORIDE 20 MG: 10 CAPSULE ORAL at 12:22

## 2020-08-13 RX ADMIN — SODIUM CHLORIDE 75 ML/HR: 0.9 INJECTION, SOLUTION INTRAVENOUS at 05:06

## 2020-08-13 RX ADMIN — METRONIDAZOLE 500 MG: 500 INJECTION, SOLUTION INTRAVENOUS at 21:29

## 2020-08-13 RX ADMIN — IOHEXOL 75 ML: 350 INJECTION, SOLUTION INTRAVENOUS at 03:19

## 2020-08-13 RX ADMIN — Medication 125 MG: at 22:12

## 2020-08-13 RX ADMIN — DICYCLOMINE HYDROCHLORIDE 20 MG: 10 CAPSULE ORAL at 17:22

## 2020-08-13 RX ADMIN — CIPROFLOXACIN 400 MG: 2 INJECTION, SOLUTION INTRAVENOUS at 05:06

## 2020-08-13 RX ADMIN — METHYLPREDNISOLONE SODIUM SUCCINATE 20 MG: 40 INJECTION, POWDER, FOR SOLUTION INTRAMUSCULAR; INTRAVENOUS at 15:19

## 2020-08-13 RX ADMIN — METRONIDAZOLE 500 MG: 500 INJECTION, SOLUTION INTRAVENOUS at 04:21

## 2020-08-13 RX ADMIN — SODIUM CHLORIDE 75 ML/HR: 0.9 INJECTION, SOLUTION INTRAVENOUS at 21:29

## 2020-08-13 RX ADMIN — METHYLPREDNISOLONE SODIUM SUCCINATE 20 MG: 40 INJECTION, POWDER, FOR SOLUTION INTRAMUSCULAR; INTRAVENOUS at 05:06

## 2020-08-13 RX ADMIN — PANTOPRAZOLE SODIUM 40 MG: 40 TABLET, DELAYED RELEASE ORAL at 05:06

## 2020-08-13 RX ADMIN — CIPROFLOXACIN 400 MG: 2 INJECTION, SOLUTION INTRAVENOUS at 17:22

## 2020-08-13 RX ADMIN — METHYLPREDNISOLONE SODIUM SUCCINATE 20 MG: 40 INJECTION, POWDER, FOR SOLUTION INTRAMUSCULAR; INTRAVENOUS at 22:12

## 2020-08-13 RX ADMIN — METRONIDAZOLE 500 MG: 500 INJECTION, SOLUTION INTRAVENOUS at 12:22

## 2020-08-13 RX ADMIN — DICYCLOMINE HYDROCHLORIDE 20 MG: 10 CAPSULE ORAL at 22:12

## 2020-08-13 RX ADMIN — MORPHINE SULFATE 4 MG: 4 INJECTION INTRAVENOUS at 02:51

## 2020-08-13 RX ADMIN — Medication 125 MG: at 09:16

## 2020-08-13 NOTE — H&P
[address] [facility]  H&P- Genette Lick 1995, 26 y o  male MRN: [id number]  Unit/Bed#: [address] [address] Encounter: [id number]  Primary Care Provider: Lyn Rosas   Date and time admitted to hospital: 8/13/2020  2:38 AM    Exacerbation of Crohn's disease ([address] )  Assessment & Plan  Pt presented with worsening epigastric/LUQ pain x1 week  The pain has been off and on most days for the past week but today has been constant for hours and radiating to his back  He denies fevers, chills, N/V  Normal bowel movements, last BM yesterday, non-bloody  He follows with Dr Yecenia Richardson outpatient but unfortunately had an appointment switched and was unable to be seen since his last admission from 7/22-7/25   - WBC 14 on admission  - CT showed multifocal colitis and enteritis compatible with a Crohn's flare up  - Pt believes his last Entyvio dose was last Thursday, 8/06  - He has been taking prednisone 40 mg PO daily   - Methylprednisolone 60 mg, appreciate GI recommendations for further steroid dosing   - Continue cipro and flagyl  - Previous labs suggesting C diff colonization without active infection, will add prophylactic vanco dose while on Abx with repeat C diff/stool enteric panel  - Pt has been tolerating liquids, trial clear liquid diet and advance as possible   - prn pain control  - f/u blood cx  - GI consult       VTE Prophylaxis: Pharmacologic VTE Prophylaxis contraindicated due to low risk  / reason for no mechanical VTE prophylaxis lowr isk   Code Status: Level 1  POLST: POLST is not applicable to this patient  Discussion with family: No    Anticipated Length of Stay:  Patient will be admitted on an Inpatient basis with an anticipated length of stay of  > 2 midnights  Justification for Hospital Stay: Crohn's flare    Total Time for Visit, including Counseling / Coordination of Care: 30 minutes  Greater than 50% of this total time spent on direct patient counseling and coordination of care      Chief Complaint:   Abdominal pain    History of Present Illness:    Onofre Strickland is a 26 y o  male who presents with PMH of Crohn's disease and mild intermittent asthma who presented to the ED today with worsening abdominal pain x 1 week  He has had off and on abdominal pain in LUQ which patient states mostly comes in the morning for an hour but goes away  Tonight he had more severe pain that lasted multiple hours and started radiating ot his back  He denies fevers, chills, nausea, vomiting, diarrhea, hematochezia/melena  He reports his appetite has improved since his last admission and he has been taking his home medications  He believes his last Entyvio dose was last Thursday 8/06  He has been taking prednisone 40 mg PO daily  He had a scheduled appointment with his GI doctor, Dr Luz Jhaveri, which unfortunately had to be switched, and patient is now scheduled to follow up in early September  Patient was afebrile in the ED with tachycardia and WBC of 14  CT in the ED showed multifocal colitis and enteritis suggestive of Crohn's flare  Patient was given IVF, cipro, Flagyl  His pain improved with 1x dose of morphine in ED  Review of Systems:    Review of Systems   Constitutional: Negative for chills, fatigue and fever  HENT: Negative for congestion, rhinorrhea and sore throat  Respiratory: Negative for cough, shortness of breath and wheezing  Cardiovascular: Negative for chest pain, palpitations and leg swelling  Gastrointestinal: Positive for abdominal pain  Negative for abdominal distention, blood in stool, constipation, diarrhea, nausea and vomiting  Genitourinary: Negative for dysuria, frequency and urgency  Musculoskeletal: Positive for back pain  Negative for myalgias  Neurological: Negative for dizziness, weakness, light-headedness and headaches  Past Medical and Surgical History:     Past Medical History:   Diagnosis Date    Asthma     Crohn disease ([facility] [address] )        History reviewed   No pertinent surgical history  Meds/Allergies:    Prior to Admission medications    Medication Sig Start Date End Date Taking? Authorizing Provider   acetaminophen (TYLENOL) 325 mg tablet Take 2 tablets (650 mg total) by mouth every 6 (six) hours as needed for fever 7/25/20  Yes Breanne Wasserman MD   omeprazole (PriLOSEC) 40 MG capsule Take 40 mg by mouth daily   Yes Historical Provider, MD   predniSONE 20 mg tablet Take 2 tablets (40 mg total) by mouth daily 7/26/20  Yes Breanne Wasserman MD   vedolizumab (Entyvio) 300 MG SOLR Infuse 300 mg into a venous catheter once a week   Yes Historical Provider, MD     I have reviewed home medications with patient personally  Allergies: No Known Allergies    Social History:    Social History     Substance and Sexual Activity   Alcohol Use Never    Frequency: Never    Drinks per session: Patient refused    Binge frequency: Never     Social History     Tobacco Use   Smoking Status Never Smoker   Smokeless Tobacco Never Used     Social History     Substance and Sexual Activity   Drug Use Never       Family History:    Family History   Problem Relation Age of Onset    Asthma Mother        Physical Exam:     Vitals:   Blood Pressure: 126/80 (08/13/20 0441)  Pulse: 97 (08/13/20 0441)  Temperature: 98 6 °F (37 °C) (08/13/20 0441)  Temp Source: Oral (08/13/20 0441)  Respirations: 18 (08/13/20 0441)  Weight - Scale: 51 1 kg (112 lb 11 2 oz) (08/13/20 0241)  SpO2: 99 % (08/13/20 0441)    Physical Exam  Vitals signs reviewed  Constitutional:       General: He is not in acute distress  Appearance: Normal appearance  He is well-developed and underweight  He is not ill-appearing or toxic-appearing  HENT:      Head: Normocephalic and atraumatic  Cardiovascular:      Rate and Rhythm: Regular rhythm  Tachycardia present  Heart sounds: Normal heart sounds  No murmur  Pulmonary:      Effort: Pulmonary effort is normal  No respiratory distress        Breath sounds: Normal breath sounds  No wheezing or rales  Abdominal:      General: Bowel sounds are normal  There is no distension  Palpations: Abdomen is soft  Tenderness: There is no abdominal tenderness  There is no guarding  Musculoskeletal:         General: No swelling or tenderness  Skin:     General: Skin is warm and dry  Neurological:      Mental Status: He is alert and oriented to person, place, and time  Psychiatric:         Mood and Affect: Mood normal          Behavior: Behavior normal          Additional Data:     Lab Results: I have personally reviewed pertinent reports  Results from last 7 days   Lab Units 08/13/20  0251   WBC Thousand/uL 14 53*   HEMOGLOBIN g/dL 13 7   HEMATOCRIT % 41 7   PLATELETS Thousands/uL 416*   NEUTROS PCT % 65   LYMPHS PCT % 24   MONOS PCT % 8   EOS PCT % 0     Results from last 7 days   Lab Units 08/13/20  0251   SODIUM mmol/L 140   POTASSIUM mmol/L 4 1   CHLORIDE mmol/L 101   CO2 mmol/L 32   BUN mg/dL 15   CREATININE mg/dL 1 00   ANION GAP mmol/L 7   CALCIUM mg/dL 9 1   ALBUMIN g/dL 3 2*   TOTAL BILIRUBIN mg/dL 0 40   ALK PHOS U/L 107   ALT U/L 13   AST U/L 16   GLUCOSE RANDOM mg/dL 90                 Results from last 7 days   Lab Units 08/13/20  0414   LACTIC ACID mmol/L 1 3       Imaging: I have personally reviewed pertinent reports  CT abdomen pelvis with contrast   Final Result by Terri Cartagena MD (08/13 0640)      Multifocal colitis and enteritis compatible with a Crohn's flare up  Workstation performed: [id number]             EKG, Pathology, and Other Studies Reviewed on Admission:   · EKG: none    Allscripts / Epic Records Reviewed: Yes     ** Please Note: This note has been constructed using a voice recognition system   **

## 2020-08-13 NOTE — ASSESSMENT & PLAN NOTE
Pt presented with worsening epigastric/LUQ pain x1 week  The pain has been off and on most days for the past week but today has been constant for hours and radiating to his back  He denies fevers, chills, N/V  Normal bowel movements, last BM yesterday, non-bloody   He follows with Dr Tia Valentine outpatient but unfortunately had an appointment switched and was unable to be seen since his last admission from 7/22-7/25   - WBC 14 on admission  - CT showed multifocal colitis and enteritis compatible with a Crohn's flare up  - Pt believes his last Entyvio dose was last Thursday, 8/06  - He has been taking prednisone 40 mg PO daily   - Methylprednisolone 60 mg, appreciate GI recommendations for further steroid dosing   - Continue cipro and flagyl  - Previous labs suggesting C diff colonization without active infection, will add prophylactic vanco dose while on Abx with repeat C diff/stool enteric panel  - Pt has been tolerating liquids, trial clear liquid diet and advance as possible   - prn pain control  - f/u blood cx  - GI consult

## 2020-08-13 NOTE — SOCIAL WORK
LOS: 0 GMLOS: 2 7    Pt is a 30 day readmission  Pt is not a bundle  Pt presented to [facility] ED with compliant of left-sided abdominal pain lasting for 1 week  Pt known to have a PMHx of Crohn's Disease and was last admitted to [facility] 7/22-7/25 for treatment of same  Pt admitted for further evaluation of Crohn's flare as evident on CT scan  Stool cxs pending to r/o Cdiff  SW contacted pt to complete assessment  Pt identifies his mother Soundra Curling ([phone]) as emergency contact  Pt is currently unemployed-relying on his mother for financial support  They live together in a 2nd floor apartment  Pt is fully independent with ADLs and drives self as means of transportation  No hx of substance abuse or MH dx hx reported  Pt's PCP is Dr Lashawn Lynch in [place], [place] (where pt is originally from)  Gastroenterologist is Dr Allen Richard through [facility]  Preferred pharmacy is [facility]  SW confirmed insurance coverage  Pt denied any barriers to obtaining home medications  When medically cleared for discharge, pt's mother to transport home  Pt states on discharge from last admission he was able to obtain all meds from [place] and took compliantly  Pt was to have a GI f/u scheduled for 8/6 which was moved to 9/2  Pt was seen in office on day of admission where he received an ultrasound/xray  Pt did not follow-up with PCP  Pt states "the issue is GI-I don't need to see my PCP for that " No questions or concerns from pt at this time  SW will continue to follow for discharge needs

## 2020-08-13 NOTE — CONSULTS
Consultation -  [facility]  Lorella Scheuermann 26 y o  male MRN: [id number]  Unit/Bed#: [address] [place] [address] Encounter: [id number]         Reason for Consult / Principal Problem:  Abdominal pain radiating into the back    HPI: Shelli Easton is a 43-year-old male with history of Crohn's disease of both the small and large intestine diagnosed at age 16 currently on [de-identified]  He follows with Dr Fatoumata Aguilar as an outpatient  Patient was recently discharged from the hospital in late July for Crohn's disease flare  At that time, patient CRP was elevated at 93  CT imaging showing moderate wall thickening and hyperemia of several loops in the distal ileum and colon  Patient has only had 2 doses of Entyvio thus far  He is due for his next infusion on 08/25  Patient reports that since being discharged on prednisone, he was doing well overall  He was eating  He was no longer nauseous or vomiting  His stools were beginning to form  However, for the past week he developed abdominal pain radiating into the back specifically usually in the middle of the night around 4:00 a m  On this admission, his leukocytosis is improved to 14,000  CRP is 52  He is not anemic  CT showing similar findings with multifocal colitis and enteritis  Patient was seen examined at the bedside this morning  He reports that he is still having intermittent abdominal pain radiating to the back  He denies nausea or vomiting  He was tolerating clear liquids without any issues this morning  He continues to be afebrile  Review of Systems:    CONSTITUTIONAL: Denies any fever, chills, or rigors  Good appetite, and no recent weight loss  HEENT: No earache or tinnitus  Denies hearing loss or visual disturbances  CARDIOVASCULAR: No chest pain or palpitations  RESPIRATORY: Denies any cough, hemoptysis, shortness of breath or dyspnea on exertion  GASTROINTESTINAL: As noted in the History of Present Illness     GENITOURINARY: No problems with urination  Denies any hematuria or dysuria  NEUROLOGIC: No dizziness or vertigo, denies headaches  MUSCULOSKELETAL: Denies any muscle or joint pain  SKIN: Denies skin rashes or itching  ENDOCRINE: Denies excessive thirst  Denies intolerance to heat or cold  PSYCHOSOCIAL: Denies depression or anxiety  Denies any recent memory loss  Historical Information   Past Medical History:   Diagnosis Date    Asthma     Crohn disease ([address] )      History reviewed  No pertinent surgical history  Social History   Social History     Substance and Sexual Activity   Alcohol Use Never    Frequency: Never    Drinks per session: Patient refused    Binge frequency: Never     Social History     Substance and Sexual Activity   Drug Use Never     Social History     Tobacco Use   Smoking Status Never Smoker   Smokeless Tobacco Never Used     Family History   Problem Relation Age of Onset    Asthma Mother         Meds/Allergies     Current Facility-Administered Medications   Medication Dose Route Frequency    acetaminophen (TYLENOL) tablet 650 mg  650 mg Oral Q6H PRN    ciprofloxacin (CIPRO) IVPB (premix) 400 mg 200 mL  400 mg Intravenous Q12H    dicyclomine (BENTYL) capsule 20 mg  20 mg Oral 4x Daily (AC & HS)    methylPREDNISolone sodium succinate (Solu-MEDROL) injection 20 mg  20 mg Intravenous Q8H [facility]    metroNIDAZOLE (FLAGYL) IVPB (premix) 500 mg 100 mL  500 mg Intravenous Q8H    morphine injection 2 mg  2 mg Intravenous Q4H PRN    ondansetron (ZOFRAN) injection 4 mg  4 mg Intravenous Q6H PRN    pantoprazole (PROTONIX) EC tablet 40 mg  40 mg Oral Early Morning    sodium chloride 0 9 % infusion  75 mL/hr Intravenous Continuous    vancomycin (VANCOCIN) oral solution 125 mg  125 mg Oral Q12H [facility]       No Known Allergies      Objective     Blood pressure 123/78, pulse 94, temperature 98 3 °F (36 8 °C), temperature source Oral, resp   rate 18, height 5' 6" (1 676 m), weight 51 1 kg (112 lb 11 2 oz), SpO2 99 %       Intake/Output Summary (Last 24 hours) at 8/13/2020 1056  Last data filed at 8/13/2020 0830  Gross per 24 hour   Intake 360 ml   Output 300 ml   Net 60 ml         PHYSICAL EXAM:      General Appearance:   Alert and oriented x 3  Cooperative, and in no respiratory distress   HEENT:   Normocephalic, atraumatic, anicteric      Neck:  Supple, symmetrical, trachea midline   Lungs:   Clear to auscultation bilaterally; no rales, rhonchi or wheezing; respirations unlabored    Heart[de-identified]   S1 and S2 normal; regular rate and rhythm; no murmur, rub, or gallop  Abdomen:   Soft, non-tender, non-distended; normal bowel sounds; no masses, no organomegaly    Genitalia:   Deferred    Rectal:   Deferred    Extremities:  No cyanosis, clubbing or edema    Pulses:  2+ and symmetric all extremities    Skin:  Skin color, texture, turgor normal, no rashes or lesions    Lymph nodes:  No palpable cervical or supraclavicular lymphadenopathy        Lab Results:   Results from last 7 days   Lab Units 08/13/20  0251   WBC Thousand/uL 14 53*   HEMOGLOBIN g/dL 13 7   HEMATOCRIT % 41 7   PLATELETS Thousands/uL 416*   NEUTROS PCT % 65   LYMPHS PCT % 24   MONOS PCT % 8   EOS PCT % 0     Results from last 7 days   Lab Units 08/13/20  0251   POTASSIUM mmol/L 4 1   CHLORIDE mmol/L 101   CO2 mmol/L 32   BUN mg/dL 15   CREATININE mg/dL 1 00   CALCIUM mg/dL 9 1   ALK PHOS U/L 107   ALT U/L 13   AST U/L 16         Results from last 7 days   Lab Units 08/13/20  0251   LIPASE u/L 167       Imaging Studies: I have personally reviewed pertinent imaging studies  Ct Abdomen Pelvis With Contrast    Result Date: 8/13/2020  Impression: Multifocal colitis and enteritis compatible with a Crohn's flare up  Workstation performed: [id number]       ASSESSMENT and PLAN:      1) Crohn's disease of the small and large intestine - Patient was off of maintenance treatment for several years until he came to the hospital initially in March    In the past, he failed Humira and Remicade  He has been on mesalamine  I was able to speak with patient's mother over the phone  Overall, it is reassuring that he has had an improvement in his inflammatory markers since his last admission  He is going for Entyvio infusions, his 3rd will be next week  I think patient is still recovering from unmanaged disease and previous flare  He has had improvement in certain symptoms such as fecal urgency, bowel consistency and nausea/vomiting   - Entyvio as planned  - He will follow up with Dr Sj Zambrano as an outpatient  - Continue IV steroids for now as you were doing  - Continue IV antibiotics for now  - Schedule Bentyl 20 mg   - Follow-up stool studies if patient does have a liquidy bowel movement  - Will give low-fiber/low residue diet  - Plan discussed with Dr Dolly Naranjo     The patient was seen and examined by Dr Isela Jimenez, all ravi medical decisions were made with Dr Isela Jimenez  Thank you for allowing us to participate in the care of this pleasant patient  We will follow up with you closely

## 2020-08-13 NOTE — ED PROVIDER NOTES
History  Chief Complaint   Patient presents with    Abdominal Pain     Pt c/o of L sided ab pain that radiates around to his L back for 1wk  Denies N/V/constipation/diarrhea  Has not taken anything for the pain  HPI     42-year-old male presents emergency department for evaluation of left-sided abdominal pain and left-sided pain  History of Crohn's disease  Describes pain is 8/10  No aggravating or alleviating symptoms  Denies any fevers or chills  He went to his gastroenterologist's office today where an outpatient ultrasound and x-ray were ordered  He is on 40 mg of prednisone per day  He denies any diarrhea or blood in his stool  Prior to Admission Medications   Prescriptions Last Dose Informant Patient Reported? Taking?   acetaminophen (TYLENOL) 325 mg tablet Past Week at Unknown time  No Yes   Sig: Take 2 tablets (650 mg total) by mouth every 6 (six) hours as needed for fever   omeprazole (PriLOSEC) 40 MG capsule 8/12/2020 at y Self Yes Yes   Sig: Take 40 mg by mouth daily   predniSONE 20 mg tablet 8/12/2020 at Unknown time  No Yes   Sig: Take 2 tablets (40 mg total) by mouth daily   vedolizumab (Entyvio) 300 MG SOLR Past Month at Unknown time  Yes Yes   Sig: Infuse 300 mg into a venous catheter once a week      Facility-Administered Medications: None       Past Medical History:   Diagnosis Date    Asthma     Crohn disease ([facility] [address] )        History reviewed  No pertinent surgical history  Family History   Problem Relation Age of Onset    Asthma Mother      I have reviewed and agree with the history as documented      E-Cigarette/Vaping    E-Cigarette Use Never User      E-Cigarette/Vaping Substances    Nicotine No     THC No     CBD No     Flavoring No     Other No     Unknown No      Social History     Tobacco Use    Smoking status: Never Smoker    Smokeless tobacco: Never Used   Substance Use Topics    Alcohol use: Never     Frequency: Never     Drinks per session: Patient refused Binge frequency: Never    Drug use: Never       Review of Systems   Gastrointestinal: Positive for abdominal pain  All other systems reviewed and are negative  Physical Exam  Physical Exam  Vitals signs and nursing note reviewed  Constitutional:       General: He is not in acute distress  Appearance: He is well-developed  He is not diaphoretic  HENT:      Head: Normocephalic and atraumatic  Right Ear: External ear normal       Left Ear: External ear normal    Eyes:      General:         Right eye: No discharge  Left eye: No discharge  Pupils: Pupils are equal, round, and reactive to light  Neck:      Musculoskeletal: Normal range of motion and neck supple  Thyroid: No thyromegaly  Trachea: No tracheal deviation  Cardiovascular:      Rate and Rhythm: Normal rate and regular rhythm  Heart sounds: No murmur  Pulmonary:      Effort: Pulmonary effort is normal       Breath sounds: Normal breath sounds  Abdominal:      General: Bowel sounds are normal  There is no distension  Palpations: Abdomen is soft  Tenderness: There is abdominal tenderness in the right upper quadrant and right lower quadrant  Musculoskeletal: Normal range of motion  General: No deformity  Skin:     General: Skin is warm  Capillary Refill: Capillary refill takes less than 2 seconds  Neurological:      Mental Status: He is alert and oriented to person, place, and time  Cranial Nerves: No cranial nerve deficit  Motor: No abnormal muscle tone     Psychiatric:         Behavior: Behavior normal          Vital Signs  ED Triage Vitals [08/13/20 0241]   Temperature Pulse Respirations Blood Pressure SpO2   (!) 97 4 °F (36 3 °C) 105 19 148/99 100 %      Temp Source Heart Rate Source Patient Position - Orthostatic VS BP Location FiO2 (%)   Tympanic Monitor Sitting Right arm --      Pain Score       8           Vitals:    08/13/20 0241   BP: 148/99   Pulse: 105 Patient Position - Orthostatic VS: Sitting         Visual Acuity      ED Medications  Medications   sodium chloride 0 9 % bolus 1,000 mL (1,000 mL Intravenous New Bag 8/13/20 0253)   ciprofloxacin (CIPRO) IVPB (premix) 400 mg 200 mL (has no administration in time range)   metroNIDAZOLE (FLAGYL) IVPB (premix) 500 mg 100 mL (has no administration in time range)   morphine (PF) 4 mg/mL injection 4 mg (4 mg Intravenous Given 8/13/20 0251)   iohexol (OMNIPAQUE) 350 MG/ML injection (SINGLE-DOSE) 75 mL (75 mL Intravenous Given 8/13/20 0319)       Diagnostic Studies  Results Reviewed     Procedure Component Value Units Date/Time    Lactic acid, plasma [[id number]]     Lab Status:  No result Specimen:  Blood     Blood culture #1 [[id number]]     Lab Status:  No result Specimen:  Blood     Blood culture #2 [[id number]]     Lab Status:  No result Specimen:  Blood     Lipase [[id number]]  (Normal) Collected:  08/13/20 0251    Lab Status:  Final result Specimen:  Blood from Arm, Left Updated:  08/13/20 0318     Lipase 167 u/L     Comprehensive metabolic panel [[id number]]  (Abnormal) Collected:  08/13/20 0251    Lab Status:  Final result Specimen:  Blood from Arm, Left Updated:  08/13/20 0318     Sodium 140 mmol/L      Potassium 4 1 mmol/L      Chloride 101 mmol/L      CO2 32 mmol/L      ANION GAP 7 mmol/L      BUN 15 mg/dL      Creatinine 1 00 mg/dL      Glucose 90 mg/dL      Calcium 9 1 mg/dL      AST 16 U/L      ALT 13 U/L      Alkaline Phosphatase 107 U/L      Total Protein 7 8 g/dL      Albumin 3 2 g/dL      Total Bilirubin 0 40 mg/dL      eGFR 121 ml/min/1 73sq m     Narrative:       [place] guidelines for Chronic Kidney Disease (CKD):     Stage 1 with normal or high GFR (GFR > 90 mL/min/1 73 square meters)    Stage 2 Mild CKD (GFR = 60-89 mL/min/1 73 square meters)    Stage 3A Moderate CKD (GFR = 45-59 mL/min/1 73 square meters)    Stage 3B Moderate CKD (GFR = 30-44 mL/min/1 73 square meters)    Stage 4 Severe CKD (GFR = 15-29 mL/min/1 73 square meters)    Stage 5 End Stage CKD (GFR <15 mL/min/1 73 square meters)  Note: GFR calculation is accurate only with a steady state creatinine    C-reactive protein [[id number]]  (Abnormal) Collected:  08/13/20 0251    Lab Status:  Final result Specimen:  Blood from Arm, Left Updated:  08/13/20 0318     CRP 52 3 mg/L     Sedimentation rate, automated [[id number]]  (Abnormal) Collected:  08/13/20 0251    Lab Status:  Final result Specimen:  Blood from Arm, Left Updated:  08/13/20 0313     Sed Rate 16 mm/hour     Narrative:       New method- Test performed using  automated Rheology Technology  If following a patient's inflammatory disease during treatment, a new baseline should be established  CBC and differential [[id number]]  (Abnormal) Collected:  08/13/20 0251    Lab Status:  Final result Specimen:  Blood from Arm, Left Updated:  08/13/20 0259     WBC 14 53 Thousand/uL      RBC 4 62 Million/uL      Hemoglobin 13 7 g/dL      Hematocrit 41 7 %      MCV 90 fL      MCH 29 7 pg      MCHC 32 9 g/dL      RDW 15 4 %      MPV 8 9 fL      Platelets 822 Thousands/uL      nRBC 0 /100 WBCs      Neutrophils Relative 65 %      Immat GRANS % 2 %      Lymphocytes Relative 24 %      Monocytes Relative 8 %      Eosinophils Relative 0 %      Basophils Relative 1 %      Neutrophils Absolute 9 48 Thousands/µL      Immature Grans Absolute 0 32 Thousand/uL      Lymphocytes Absolute 3 50 Thousands/µL      Monocytes Absolute 1 14 Thousand/µL      Eosinophils Absolute 0 01 Thousand/µL      Basophils Absolute 0 08 Thousands/µL     UA w Reflex to Microscopic w Reflex to Culture [[id number]]     Lab Status:  No result Specimen:  Urine                  CT abdomen pelvis with contrast   Final Result by Lucía Betancourt MD (08/13 6946)      Multifocal colitis and enteritis compatible with a Crohn's flare up              Workstation performed: [id number] Procedures  Procedures         ED Course                                             MDM  Number of Diagnoses or Management Options  Abdominal pain: new and requires workup  Colitis: new and requires workup  [facility] ): new and requires workup  Enteritis: new and requires workup  Leukocytosis: new and requires workup  Diagnosis management comments: Diffuse abdominal tenderness, worse in the left lower quadrant  CT scan consistent with multifocal colitis/enteritis consistent with Crohn's flare  Does have a leukocytosis  Nonspecific in setting of steroid use  Due to history, CT findings of multifocal colitis/enteritis, Cipro/Flagyl were ordered and given to patient  Patient's pain improved with IV morphine  He was given IV fluids  Will admit to hospitalist service          Amount and/or Complexity of Data Reviewed  Clinical lab tests: ordered  Tests in the radiology section of CPT®: ordered and reviewed  Discuss the patient with other providers: yes    Risk of Complications, Morbidity, and/or Mortality  Presenting problems: high  Diagnostic procedures: high  Management options: high    Patient Progress  Patient progress: improved        Disposition  Final diagnoses:   Abdominal pain   Colitis   Crohn's colitis ([facility] )   Enteritis   Leukocytosis     Time reflects when diagnosis was documented in both MDM as applicable and the Disposition within this note     Time User Action Codes Description Comment    8/13/2020  3:50 AM Carlos Manuel Apt Add [R10 9] Abdominal pain     8/13/2020  3:50 AM Carlos Manuel Apt Add [K52 9] Colitis     8/13/2020  3:50 AM Carlos Manuel Apt Add [K50 10] Crohn's colitis ([facility] )     8/13/2020  3:50 AM Carlos Manuel Apt Add [K52 9] Enteritis     8/13/2020  3:50 AM Carlos Manuel Apt Add [D72 829] Leukocytosis       ED Disposition     ED Disposition Condition Date/Time Comment    Admit Stable Thu Aug 13, 2020  3:50 AM Case was discussed with [facility] and the patient's admission status was agreed to be Admission Status: inpatient status to the service of Dr Kelton Severs   Follow-up Information    None         Patient's Medications   Discharge Prescriptions    No medications on file     No discharge procedures on file      PDMP Review     None          ED Provider  Electronically Signed by           Luciano Pham MD  08/13/20 3476

## 2020-08-14 VITALS
DIASTOLIC BLOOD PRESSURE: 64 MMHG | BODY MASS INDEX: 18.11 KG/M2 | HEIGHT: 66 IN | WEIGHT: 112.7 LBS | SYSTOLIC BLOOD PRESSURE: 103 MMHG | OXYGEN SATURATION: 97 % | RESPIRATION RATE: 18 BRPM | TEMPERATURE: 97.6 F | HEART RATE: 87 BPM

## 2020-08-14 PROBLEM — D72.829 LEUKOCYTOSIS: Status: ACTIVE | Noted: 2020-08-14

## 2020-08-14 PROBLEM — Z22.1 CLOSTRIDIUM DIFFICILE CARRIER: Status: ACTIVE | Noted: 2020-08-14

## 2020-08-14 PROBLEM — A04.72 C. DIFFICILE COLITIS: Status: ACTIVE | Noted: 2020-08-14

## 2020-08-14 LAB
ANION GAP SERPL CALCULATED.3IONS-SCNC: 7 MMOL/L (ref 4–13)
BUN SERPL-MCNC: 12 MG/DL (ref 5–25)
CALCIUM SERPL-MCNC: 9 MG/DL (ref 8.3–10.1)
CHLORIDE SERPL-SCNC: 101 MMOL/L (ref 100–108)
CO2 SERPL-SCNC: 27 MMOL/L (ref 21–32)
CREAT SERPL-MCNC: 1.05 MG/DL (ref 0.6–1.3)
ERYTHROCYTE [DISTWIDTH] IN BLOOD BY AUTOMATED COUNT: 15.1 % (ref 11.6–15.1)
GFR SERPL CREATININE-BSD FRML MDRD: 114 ML/MIN/1.73SQ M
GLUCOSE SERPL-MCNC: 118 MG/DL (ref 65–140)
HCT VFR BLD AUTO: 36.6 % (ref 36.5–49.3)
HGB BLD-MCNC: 12.1 G/DL (ref 12–17)
MCH RBC QN AUTO: 30 PG (ref 26.8–34.3)
MCHC RBC AUTO-ENTMCNC: 33.1 G/DL (ref 31.4–37.4)
MCV RBC AUTO: 91 FL (ref 82–98)
PLATELET # BLD AUTO: 361 THOUSANDS/UL (ref 149–390)
PMV BLD AUTO: 8.7 FL (ref 8.9–12.7)
POTASSIUM SERPL-SCNC: 3.9 MMOL/L (ref 3.5–5.3)
RBC # BLD AUTO: 4.03 MILLION/UL (ref 3.88–5.62)
SODIUM SERPL-SCNC: 135 MMOL/L (ref 136–145)
WBC # BLD AUTO: 16.88 THOUSAND/UL (ref 4.31–10.16)

## 2020-08-14 PROCEDURE — 99232 SBSQ HOSP IP/OBS MODERATE 35: CPT | Performed by: INTERNAL MEDICINE

## 2020-08-14 PROCEDURE — 80048 BASIC METABOLIC PNL TOTAL CA: CPT | Performed by: FAMILY MEDICINE

## 2020-08-14 PROCEDURE — 85027 COMPLETE CBC AUTOMATED: CPT | Performed by: FAMILY MEDICINE

## 2020-08-14 PROCEDURE — 99239 HOSP IP/OBS DSCHRG MGMT >30: CPT | Performed by: FAMILY MEDICINE

## 2020-08-14 RX ORDER — PREDNISONE 10 MG/1
TABLET ORAL
Qty: 126 TABLET | Refills: 0 | Status: SHIPPED | OUTPATIENT
Start: 2020-08-15 | End: 2020-09-19 | Stop reason: HOSPADM

## 2020-08-14 RX ORDER — VANCOMYCIN HYDROCHLORIDE 125 MG/1
125 CAPSULE ORAL 4 TIMES DAILY
Qty: 40 CAPSULE | Refills: 0 | Status: SHIPPED | OUTPATIENT
Start: 2020-08-14 | End: 2020-08-14 | Stop reason: HOSPADM

## 2020-08-14 RX ORDER — VANCOMYCIN HYDROCHLORIDE 125 MG/1
125 CAPSULE ORAL 2 TIMES DAILY
Qty: 5 CAPSULE | Refills: 0 | Status: SHIPPED | OUTPATIENT
Start: 2020-08-14 | End: 2020-08-14 | Stop reason: SDUPTHER

## 2020-08-14 RX ORDER — VANCOMYCIN HYDROCHLORIDE 125 MG/1
125 CAPSULE ORAL 2 TIMES DAILY
Qty: 5 CAPSULE | Refills: 0
Start: 2020-08-14 | End: 2020-08-17

## 2020-08-14 RX ORDER — DICYCLOMINE HYDROCHLORIDE 10 MG/1
CAPSULE ORAL
Qty: 30 CAPSULE | Refills: 0 | Status: SHIPPED | OUTPATIENT
Start: 2020-08-14 | End: 2020-09-19 | Stop reason: HOSPADM

## 2020-08-14 RX ADMIN — CIPROFLOXACIN 400 MG: 2 INJECTION, SOLUTION INTRAVENOUS at 05:43

## 2020-08-14 RX ADMIN — METHYLPREDNISOLONE SODIUM SUCCINATE 20 MG: 40 INJECTION, POWDER, FOR SOLUTION INTRAMUSCULAR; INTRAVENOUS at 05:43

## 2020-08-14 RX ADMIN — Medication 125 MG: at 10:25

## 2020-08-14 RX ADMIN — DICYCLOMINE HYDROCHLORIDE 20 MG: 10 CAPSULE ORAL at 12:21

## 2020-08-14 RX ADMIN — PANTOPRAZOLE SODIUM 40 MG: 40 TABLET, DELAYED RELEASE ORAL at 05:43

## 2020-08-14 RX ADMIN — DICYCLOMINE HYDROCHLORIDE 20 MG: 10 CAPSULE ORAL at 06:44

## 2020-08-14 RX ADMIN — METRONIDAZOLE 500 MG: 500 INJECTION, SOLUTION INTRAVENOUS at 04:51

## 2020-08-14 RX ADMIN — METHYLPREDNISOLONE SODIUM SUCCINATE 20 MG: 40 INJECTION, POWDER, FOR SOLUTION INTRAMUSCULAR; INTRAVENOUS at 14:32

## 2020-08-14 NOTE — DISCHARGE INSTRUCTIONS
Please go for your [facility] as scheduled next week

## 2020-08-14 NOTE — PLAN OF CARE
Problem: Nutrition/Hydration-ADULT  Goal: Nutrient/Hydration intake appropriate for improving, restoring or maintaining nutritional needs  Description: Monitor and assess patient's nutrition/hydration status for malnutrition  Collaborate with interdisciplinary team and initiate plan and interventions as ordered  Monitor patient's weight and dietary intake as ordered or per policy  Utilize nutrition screening tool and intervene as necessary  Determine patient's food preferences and provide high-protein, high-caloric foods as appropriate  INTERVENTIONS:  - Monitor oral intake, urinary output, labs, and treatment plans  - Assess nutrition and hydration status and recommend course of action  - Evaluate amount of meals eaten  - Allow adequate time for meals  - Recommend/ encourage appropriate diets, oral nutritional supplements, and vitamin/mineral supplements  - Order, calculate, and assess calorie counts as needed  - Assess need for intravenous fluids  - Provide specific nutrition/hydration education as appropriate  - Include patient/family/caregiver in decisions related to nutrition  Outcome: Progressing     Problem: PAIN - ADULT  Goal: Verbalizes/displays adequate comfort level or baseline comfort level  Description: Interventions:  - Encourage patient to monitor pain and request assistance  - Assess pain using appropriate pain scale  - Administer analgesics based on type and severity of pain and evaluate response  - Implement non-pharmacological measures as appropriate and evaluate response  - Consider cultural and social influences on pain and pain management  - Notify physician/advanced practitioner if interventions unsuccessful or patient reports new pain  Outcome: Progressing - Patient has no complaints of pain at this time       Problem: GASTROINTESTINAL - ADULT  Goal: Maintains or returns to baseline bowel function  Description: INTERVENTIONS:  - Assess bowel function  - Encourage oral fluids to ensure adequate hydration  - Administer IV fluids if ordered to ensure adequate hydration  - Administer ordered medications as needed  - Encourage mobilization and activity  - Consider nutritional services referral to assist patient with adequate nutrition and appropriate food choices  Outcome: Progressing

## 2020-08-14 NOTE — ASSESSMENT & PLAN NOTE
Pt presented with worsening epigastric/LUQ pain x1 week  The pain had been off and on most days for the past week but on day of admission had been constant for hours and radiating to his back  He denied fevers, chills, N/V  Normal bowel movements, last BM yesterday, non-bloody  He follows with Dr Kamila Evans outpatient but unfortunately had an appointment switched and was unable to be seen since his last admission from 7/22-7/25   - CT showed multifocal colitis and enteritis compatible with a Crohn's flare up  - Pt believes his last Entyvio dose was last Thursday, 8/06  - patient was on IV Solu-Medrol here and transitioned to prednisone taper as per GI  Patient to take 40 mg of prednisone daily for 1 week and then decrease to does by 5 mg each week till he completes the course  - he was started on IV cipro and flagyl which was later discontinued  Pt tolerating low-fiber/low residue diet  - Bentyl p r n   On discharge as per GI  - blood cx negative  - follow up with own GI after discharge and patient advised to [facility]

## 2020-08-14 NOTE — ASSESSMENT & PLAN NOTE
Likely due to steroid use at home and also while here  No Obvious source of infection    Patient can get repeat lab work as outpatient and follow up with PCP

## 2020-08-14 NOTE — PLAN OF CARE
Problem: Nutrition/Hydration-ADULT  Goal: Nutrient/Hydration intake appropriate for improving, restoring or maintaining nutritional needs  Description: Monitor and assess patient's nutrition/hydration status for malnutrition  Collaborate with interdisciplinary team and initiate plan and interventions as ordered  Monitor patient's weight and dietary intake as ordered or per policy  Utilize nutrition screening tool and intervene as necessary  Determine patient's food preferences and provide high-protein, high-caloric foods as appropriate       INTERVENTIONS:  - Monitor oral intake, urinary output, labs, and treatment plans  - Assess nutrition and hydration status and recommend course of action  - Evaluate amount of meals eaten  - Assist patient with eating if necessary   - Allow adequate time for meals  - Recommend/ encourage appropriate diets, oral nutritional supplements, and vitamin/mineral supplements  - Order, calculate, and assess calorie counts as needed  - Recommend, monitor, and adjust tube feedings and TPN/PPN based on assessed needs  - Assess need for intravenous fluids  - Provide specific nutrition/hydration education as appropriate  - Include patient/family/caregiver in decisions related to nutrition  Outcome: Adequate for Discharge     Problem: PAIN - ADULT  Goal: Verbalizes/displays adequate comfort level or baseline comfort level  Description: Interventions:  - Encourage patient to monitor pain and request assistance  - Assess pain using appropriate pain scale  - Administer analgesics based on type and severity of pain and evaluate response  - Implement non-pharmacological measures as appropriate and evaluate response  - Consider cultural and social influences on pain and pain management  - Notify physician/advanced practitioner if interventions unsuccessful or patient reports new pain  Outcome: Adequate for Discharge     Problem: GASTROINTESTINAL - ADULT  Goal: Maintains or returns to baseline bowel function  Description: INTERVENTIONS:  - Assess bowel function  - Encourage oral fluids to ensure adequate hydration  - Administer IV fluids if ordered to ensure adequate hydration  - Administer ordered medications as needed  - Encourage mobilization and activity  - Consider nutritional services referral to assist patient with adequate nutrition and appropriate food choices  Outcome: Adequate for Discharge

## 2020-08-14 NOTE — DISCHARGE SUMMARY
Discharge Summary - [address] [facility]    Patient Information: Ernesto Sorto 26 y o  male MRN: [id number]  Unit/Bed#: [address] [address] Encounter: [id number]    Discharging Physician / Practitioner: Babs Gaffney DO  PCP: Mayito Stern  Admission Date: 8/13/2020  Discharge Date: 08/14/20    Reason for Admission: Abdominal Pain (Pt c/o of L sided ab pain that radiates around to his L back for 1wk  Denies N/V/constipation/diarrhea  Has not taken anything for the pain )      Discharge Diagnoses:     Principal Problem:    Exacerbation of Crohn's disease ([facility] )  Active Problems:    Clostridium difficile carrier    Leukocytosis  Resolved Problems:    * No resolved hospital problems  *        * Exacerbation of Crohn's disease ([facility] )  Assessment & Plan  Pt presented with worsening epigastric/LUQ pain x1 week  The pain had been off and on most days for the past week but on day of admission had been constant for hours and radiating to his back  He denied fevers, chills, N/V  Normal bowel movements, last BM yesterday, non-bloody  He follows with Dr Sj Zambrano outpatient but unfortunately had an appointment switched and was unable to be seen since his last admission from 7/22-7/25   - CT showed multifocal colitis and enteritis compatible with a Crohn's flare up  - Pt believes his last Entyvio dose was last Thursday, 8/06  - patient was on IV Solu-Medrol here and transitioned to prednisone taper as per GI  Patient to take 40 mg of prednisone daily for 1 week and then decrease to does by 5 mg each week till he completes the course  - he was started on IV cipro and flagyl which was later discontinued  Pt tolerating low-fiber/low residue diet  - Bentyl p r n   On discharge as per GI  - blood cx negative  - follow up with own GI after discharge and patient advised to keep his appointment for [facility]    Leukocytosis  Assessment & Plan  Likely due to steroid use at home and also while here  No Obvious source of infection  Patient can get repeat lab work as outpatient and follow up with PCP    Clostridium difficile carrier  Assessment & Plan  - Previous labs suggesting C diff colonization without active infection  He was started on prophylactic vanco dose while on Abx and will continue p o  Vancomycin for 48 hours post discharge  No diarrhea here and stool studies were discontinued      Consultations During Hospital Stay:  [address] TO CASE MANAGEMENT    Procedures Performed:     · none    Significant Findings:     · Refer to hospital course and above listed diagnosis related plan for details    Imaging while in hospital:    Ct Abdomen Pelvis With Contrast    Result Date: 8/13/2020  Narrative: CT ABDOMEN AND PELVIS WITH IV CONTRAST INDICATION:   LLQ/left flank pain  Hx  crohn's  COMPARISON:  7/22/2020  TECHNIQUE:  CT examination of the abdomen and pelvis was performed  Axial, sagittal, and coronal 2D reformatted images were created from the source data and submitted for interpretation  Radiation dose length product (DLP) for this visit:  426 mGy-cm   This examination, like all CT scans performed in the [facility], was performed utilizing techniques to minimize radiation dose exposure, including the use of iterative reconstruction and automated exposure control  IV Contrast:  75 mL of iohexol (OMNIPAQUE) Enteric Contrast:  Enteric contrast was not administered  FINDINGS: ABDOMEN LOWER CHEST:  No clinically significant abnormality identified in the visualized lower chest  LIVER/BILIARY TREE:  Unremarkable  GALLBLADDER:  No calcified gallstones  No pericholecystic inflammatory change  SPLEEN:  Unremarkable  PANCREAS:  Unremarkable  ADRENAL GLANDS:  Unremarkable  KIDNEYS/URETERS:  Unremarkable  No hydronephrosis  STOMACH AND BOWEL:  Multifocal colitis and enteritis compatible with a Crohn's flare up  APPENDIX:  A normal appendix was visualized   ABDOMINOPELVIC CAVITY:  No ascites  No pneumoperitoneum  No lymphadenopathy  VESSELS:  Unremarkable for patient's age  PELVIS REPRODUCTIVE ORGANS:  Unremarkable for patient's age  URINARY BLADDER:  Unremarkable  ABDOMINAL WALL/INGUINAL REGIONS:  Unremarkable  OSSEOUS STRUCTURES:  No acute fracture or destructive osseous lesion  Impression: Multifocal colitis and enteritis compatible with a Crohn's flare up  Workstation performed: [id number]     Incidental Findings:   · none     Test Results Pending at Discharge (will require follow up):   · As per After Visit Summary     Outpatient Tests Requested:  · CBC    Complications:  Refer to hospital course and above listed diagnosis related plan, if any    Hospital Course:     Jacyk Wilson is a 26 y o  male patient who originally presented to the hospital on 8/13/2020 due to worsening abdominal pain x1 week  Denied diarrhea  Patient with history of Crohn's disease and 1 was taking 40 mg of prednisone daily at home and last Entyvio dose was 8/6  Patient noted to have multifocal colitis compatible with Crohn's flare up  He was admitted and started on IV Solu-Medrol and seen by GI while here  Diet was advanced  Abdominal pain improved with addition of Bentyl as well  Discharge plan discussed in details with patient  He was cleared by GI prior to discharge    Please see above list of diagnoses and related plan for additional information  Condition at Discharge: stable     Discharge Day Visit / Exam:     Subjective:  Patient denies any abdominal pain, nausea, vomiting, diarrhea    Tolerating diet    Vitals: Blood Pressure: 103/64 (08/14/20 0755)  Pulse: 87 (08/14/20 0755)  Temperature: 97 6 °F (36 4 °C) (08/14/20 0755)  Temp Source: Oral (08/13/20 0717)  Respirations: 18 (08/14/20 0755)  Height: 5' 6" (167 6 cm) (08/13/20 0551)  Weight - Scale: 51 1 kg (112 lb 11 2 oz) (08/13/20 0241)  SpO2: 97 % (08/14/20 0755)  Exam:   Physical Exam  Constitutional:       General: He is not in acute distress  Appearance: He is well-developed  He is not diaphoretic  Comments: Thin male   HENT:      Head: Normocephalic and atraumatic  Eyes:      General: No scleral icterus  Cardiovascular:      Rate and Rhythm: Normal rate and regular rhythm  Pulmonary:      Effort: Pulmonary effort is normal  No respiratory distress  Breath sounds: Normal breath sounds  No wheezing  Abdominal:      General: Bowel sounds are normal  There is no distension  Palpations: Abdomen is soft  Tenderness: There is no abdominal tenderness  Musculoskeletal:      Right lower leg: No edema  Left lower leg: No edema  Neurological:      General: No focal deficit present  Mental Status: He is alert and oriented to person, place, and time  Cranial Nerves: No cranial nerve deficit  Discharge instructions/Information to patient and family:(Discharge Medications and Follow up):   See after visit summary for information provided to patient and family  Provisions for Follow-Up Care:  See after visit summary for information related to follow-up care and any pertinent home health orders  Disposition: Home    Planned Readmission:  No     Discharge Statement:  I spent 35 minutes discharging the patient  This time was spent on the day of discharge  I had direct contact with the patient on the day of discharge  Greater than 50% of the total time was spent examining patient, answering all patient questions, arranging and discussing plan of care with patient as well as directly providing post-discharge instructions  Additional time then spent on discharge activities  Discharge Medications:  See after visit summary for reconciled discharge medications provided to patient and family  ** Please Note: "This note has been constructed using a voice recognition system  Therefore there may be syntax, spelling, and/or grammatical errors   Please call if you have any questions  "**

## 2020-08-14 NOTE — PROGRESS NOTES
Progress Note - Aarti Manley 26 y o  male MRN: [id number]    Unit/Bed#: [address] [place] [address] Encounter: [id number]        Subjective:     Has had no diarrhea  He denies any abdominal pain or back pain today  He reports that the Bentyl is helping him  Overall, he is feeling well  He was eating [employer] toast this morning  ROS: As noted in the HPI, otherwise all others negative  Objective:     Vitals: Blood pressure 103/64, pulse 87, temperature 97 6 °F (36 4 °C), resp  rate 18, height 5' 6" (1 676 m), weight 51 1 kg (112 lb 11 2 oz), SpO2 97 %  ,Body mass index is 18 19 kg/m²  Intake/Output Summary (Last 24 hours) at 8/14/2020 1011  Last data filed at 8/14/2020 0601  Gross per 24 hour   Intake 2140 ml   Output    Net 2140 ml       Physical Exam:     General Appearance: Alert and oriented x 3   In no respiratory distress  Lungs: Clear to auscultation bilaterally, no rales or rhonchi  Heart: Regular rate and rhythm, S1, S2 normal, no murmur, click, rub or gallop  Abdomen: Soft, non-tender, non-distended; bowel sounds normal; no masses or no organomegaly  Extremities: No cyanosis, edema    Invasive Devices     Peripheral Intravenous Line            Peripheral IV 08/13/20 Left Antecubital 1 day    Peripheral IV 08/13/20 Right Forearm 1 day                Lab Results:  Results from last 7 days   Lab Units 08/14/20 0447 08/13/20  0251   WBC Thousand/uL 16 88* 14 53*   HEMOGLOBIN g/dL 12 1 13 7   HEMATOCRIT % 36 6 41 7   PLATELETS Thousands/uL 361 416*   NEUTROS PCT %  --  65   LYMPHS PCT %  --  24   MONOS PCT %  --  8   EOS PCT %  --  0     Results from last 7 days   Lab Units 08/14/20  0447 08/13/20  0251   POTASSIUM mmol/L 3 9 4 1   CHLORIDE mmol/L 101 101   CO2 mmol/L 27 32   BUN mg/dL 12 15   CREATININE mg/dL 1 05 1 00   CALCIUM mg/dL 9 0 9 1   ALK PHOS U/L  --  107   ALT U/L  --  13   AST U/L  --  16         Results from last 7 days   Lab Units 08/13/20  0251   LIPASE u/L 167       Imaging Studies: I have personally reviewed pertinent imaging studies  Ct Abdomen Pelvis With Contrast    Result Date: 8/13/2020  Impression: Multifocal colitis and enteritis compatible with a Crohn's flare up  Workstation performed: [id number]         Assessment and Plan:     1) Crohn's disease of the small and large intestine - Since admission, the patient has had no diarrhea or nausea/vomiting  Patient was off of maintenance treatment for several years until he came to the hospital initially in March  In the past, he failed Humira and Remicade  He has been on mesalamine  Overall, it is reassuring that he has had an improvement in his inflammatory markers since his last admission  For example, his CRP went from 93-52  He is going for Entyvio infusions, his 3rd will be next week  I think patient is still recovering from unmanaged disease and previous flare    He has had improvement in certain symptoms such as fecal urgency, bowel consistency and nausea/vomiting   - Entyvio as scheduled next week  - Patient would like to follow up with Dr Juanis Waters  - Continue IV steroids for now as you were doing, transition to oral steroids tomorrow at 40 mg daily for 1 week then down by 5 mg each week until complete  - Okay to discontinue IV antibiotics  - Because the patient has had any diarrhea, will discontinue stool studies and precautions  - Fecal calprotectin  - Bentyl 20 mg 3 times daily as needed  - Low-fiber/low residue diet

## 2020-08-14 NOTE — ASSESSMENT & PLAN NOTE
- Previous labs suggesting C diff colonization without active infection  He was started on prophylactic vanco dose while on Abx and will continue p o   Vancomycin for 48 hours post discharge  No diarrhea here and stool studies were discontinued

## 2020-08-14 NOTE — UTILIZATION REVIEW
Initial Clinical Review    Admission: Date/Time/Statement:   Admission Orders (From admission, onward)     Ordered        08/13/20 0351  Inpatient Admission  Once                   Orders Placed This Encounter   Procedures    Inpatient Admission     Standing Status:   Standing     Number of Occurrences:   1     Order Specific Question:   Admitting Physician     Answer:   Marcie Franklin     Order Specific Question:   Level of Care     Answer:   Med Surg [16]     Order Specific Question:   Estimated length of stay     Answer:   More than 2 Midnights     Order Specific Question:   Certification     Answer:   I certify that inpatient services are medically necessary for this patient for a duration of greater than two midnights  See H&P and MD Progress Notes for additional information about the patient's course of treatment  ED Arrival Information     Expected Arrival Acuity Means of Arrival Escorted By Service Admission Type    - 8/13/2020 02:34 Urgent Walk-In Self Hospitalist Urgent    Arrival Complaint    back pain        Chief Complaint   Patient presents with    Abdominal Pain     Pt c/o of L sided ab pain that radiates around to his L back for 1wk  Denies N/V/constipation/diarrhea  Has not taken anything for the pain  1  Assessment/Plan: 25 yo male with history of Chron's disease presented to ED from home as inpatient admission for abdominal pain  Pain 8/10 with no relief pain mostly right upper and lower quadrants and radiates to his back Patient take 40 mg of prednisone daily, last entyvio dose 08-06-20  Patient denies fever chills or bloody stools   Plan IV antibiotics,IV steroids pain medication prn and GI consult Fistulizing Crohn's disease of small and large intestine  - currently on Entyvio  - most likely this is an IBD flare vs infection  - check stool enteric panel and c diff  - continue Entyvio  - continue steroids  - Bentyl 20 TID, make prn tomorrow  - continue antibiotics for now    ED Triage Vitals [08/13/20 0241]   Temperature Pulse Respirations Blood Pressure SpO2   (!) 97 4 °F (36 3 °C) 105 19 148/99 100 %      Temp Source Heart Rate Source Patient Position - Orthostatic VS BP Location FiO2 (%)   Tympanic Monitor Sitting Right arm --      Pain Score       8          Wt Readings from Last 1 Encounters:   08/13/20 51 1 kg (112 lb 11 2 oz)     Additional Vital Signs:   Date/Time   Temp   Pulse   Resp   BP   MAP (mmHg)   SpO2   O2 Device   Patient Position - Orthostatic VS    08/14/20 07:55:10   97 6 °F (36 4 °C)   87   18   103/64   77   97 %          08/14/20 03:19:47   97 9 °F (36 6 °C)   77   18   107/67   80   99 %          08/14/20 00:21:38      79   17   107/66   80   98 %          08/13/20 20:02:38   98 1 °F (36 7 °C)   90   16   102/68   79   98 %          08/13/20 15:45:15   97 6 °F (36 4 °C)   84      97/67   77   100 %          08/13/20 15:41:41   97 6 °F (36 4 °C)   82   17   96/68   77   98 %          08/13/20 07:17:21   98 3 °F (36 8 °C)   94   18   123/78   93   99 %   None (Room air)   Lying    08/13/20 04:41:22   98 6 °F (37 °C)   97   18   126/80   95   99 %   None (Room air)   Lying    08/13/20 0434      89   15   130/72      100 %          08/13/20 0249                     None (Room air)           Pertinent Labs/Diagnostic Test Results:       Results from last 7 days   Lab Units 08/14/20 0447 08/13/20  0251   WBC Thousand/uL 16 88* 14 53*   HEMOGLOBIN g/dL 12 1 13 7   HEMATOCRIT % 36 6 41 7   PLATELETS Thousands/uL 361 416*   NEUTROS ABS Thousands/µL  --  9 48*         Results from last 7 days   Lab Units 08/14/20 0447 08/13/20  0251   SODIUM mmol/L 135* 140   POTASSIUM mmol/L 3 9 4 1   CHLORIDE mmol/L 101 101   CO2 mmol/L 27 32   ANION GAP mmol/L 7 7   BUN mg/dL 12 15   CREATININE mg/dL 1 05 1 00   EGFR ml/min/1 73sq m 114 121   CALCIUM mg/dL 9 0 9 1     Results from last 7 days   Lab Units 08/13/20  0251   AST U/L 16   ALT U/L 13   ALK PHOS U/L 107   TOTAL PROTEIN g/dL 7 8   ALBUMIN g/dL 3 2*   TOTAL BILIRUBIN mg/dL 0 40         Results from last 7 days   Lab Units 08/14/20  0447 08/13/20  0251   GLUCOSE RANDOM mg/dL 118 90     Results from last 7 days   Lab Units 08/13/20  0414   LACTIC ACID mmol/L 1 3       Results from last 7 days   Lab Units 08/13/20  0251   LIPASE u/L 167     Results from last 7 days   Lab Units 08/13/20  0251   CRP mg/L 52 3*   SED RATE mm/hour 16*         Results from last 7 days   Lab Units 08/13/20  0529   CLARITY UA  Clear   COLOR UA  Yellow   SPEC GRAV UA  1 010   PH UA  7 0   GLUCOSE UA mg/dl Negative   KETONES UA mg/dl Negative   BLOOD UA  Negative   PROTEIN UA mg/dl Negative   NITRITE UA  Negative   BILIRUBIN UA  Negative   UROBILINOGEN UA E U /dl 0 2   LEUKOCYTES UA  Negative       Results from last 7 days   Lab Units 08/13/20  0414   BLOOD CULTURE  No Growth at 24 hrs  No Growth at 24 hrs  CT A/P 08-13-20  Multifocal colitis and enteritis compatible with a Crohn's flare up          ED Treatment:   Medication Administration from 08/13/2020 0234 to 08/13/2020 0439       Date/Time Order Dose Route Action     08/13/2020 0253 sodium chloride 0 9 % bolus 1,000 mL 1,000 mL Intravenous New Bag     08/13/2020 0251 morphine (PF) 4 mg/mL injection 4 mg 4 mg Intravenous Given     08/13/2020 0319 iohexol (OMNIPAQUE) 350 MG/ML injection (SINGLE-DOSE) 75 mL 75 mL Intravenous Given     08/13/2020 0421 metroNIDAZOLE (FLAGYL) IVPB (premix) 500 mg 100 mL 500 mg Intravenous New Bag        Past Medical History:   Diagnosis Date    Asthma     Crohn disease ([facility] )      Present on Admission:   Exacerbation of Crohn's disease ([place] [address] )      Admitting Diagnosis: Colitis [K52 9]  Enteritis [K52 9]  Leukocytosis [D72 829]  Abdominal pain [R10 9]  Crohn's colitis ([place] [address] ) [K50 10]  Exacerbation of Crohn's disease without complication ([place] [address] ) [N76 79]  Age/Sex: 26 y o  male  Admission Orders:  Scheduled Medications:  dicyclomine, 20 mg, Oral, 4x Daily (AC & HS)  methylPREDNISolone sodium succinate, 20 mg, Intravenous, Q8H PERLA  pantoprazole, 40 mg, Oral, Early Morning  vancomycin, 125 mg, Oral, Q12H PERLA      Continuous IV Infusions:  sodium chloride, 75 mL/hr, Intravenous, Continuous      PRN Meds:  acetaminophen, 650 mg, Oral, Q6H PRN  morphine injection, 2 mg, Intravenous, Q4H PRN  ondansetron, 4 mg, Intravenous, Q6H PRN        IP CONSULT TO GASTROENTEROLOGY  IP CONSULT TO CASE MANAGEMENT    Network Utilization Review Department  [email or web address]  ATTENTION: Please call with any questions or concerns to [phone] and carefully listen to the prompts so that you are directed to the right person  All voicemails are confidential   Jan Romano all requests for admission clinical reviews, approved or denied determinations and any other requests to dedicated fax number below belonging to the campus where the patient is receiving treatment   List of dedicated fax numbers for the Facilities:  [address] [place] [address] DENIALS (Administrative/Medical Necessity) [phone]   [address] [place] [address] (Maternity/NICU/Pediatrics) [phone]   [facility] [phone]   [facility] [phone]   Rod[facility]guez [facility] [phone]   Ileana Lerma [phone]   [address] [facility] [address] [place] [address] [phone]   Sonia Diaz [phone]   [address] [facility] [address] [facility]  [facility] [address] [facility] [phone]

## 2020-08-17 DIAGNOSIS — K50.90 EXACERBATION OF CROHN'S DISEASE (HCC): ICD-10-CM

## 2020-08-17 RX ORDER — VANCOMYCIN HYDROCHLORIDE 125 MG/1
CAPSULE ORAL
Qty: 4 CAPSULE | OUTPATIENT
Start: 2020-08-17

## 2020-08-17 NOTE — UTILIZATION REVIEW
Notification of Discharge  This is a Notification of Discharge from our facility [address]  Please be advised that this patient has been discharge from our facility  Below you will find the admission and discharge date and time including the patients disposition  PRESENTATION DATE: 8/13/2020  2:38 AM    IP ADMISSION DATE: 8/13/20 0351   DISCHARGE DATE: 8/14/2020  4:28 PM  DISPOSITION: Home/Self Care Home/Self Care   Admission Orders listed below:  Admission Orders (From admission, onward)     Ordered        08/13/20 0351  Inpatient Admission  Once                   Please contact the UR Department if additional information is required to close this patient's authorization/case  [facility] Utilization Review Department  Main: [phone] x carefully listen to the prompts  All voicemails are confidential   [email or web address]  Send all requests for admission clinical reviews, approved or denied determinations and any other requests to dedicated fax number below belonging to the campus where the patient is receiving treatment   List of dedicated fax numbers:  [address] DENIALS (Administrative/Medical Necessity) [phone]   [address] (Maternity/NICU/Pediatrics) [phone]   Fermin Dior [phone]   [facility] [phone]   Meño Archibald [phone]   [facility] [address] [place] [address] [phone]   [facility]  [phone]   [address] [facility] [address] [facility]  [facility] [address] [facility] [phone]

## 2020-08-18 LAB
BACTERIA BLD CULT: NORMAL
BACTERIA BLD CULT: NORMAL

## 2020-08-19 ENCOUNTER — HOSPITAL ENCOUNTER (OUTPATIENT)
Dept: RADIOLOGY | Facility: HOSPITAL | Age: 25
Discharge: HOME | End: 2020-08-19
Payer: COMMERCIAL

## 2020-08-19 ENCOUNTER — HOSPITAL ENCOUNTER (OUTPATIENT)
Dept: RADIOLOGY | Facility: HOSPITAL | Age: 25
Discharge: HOME | End: 2020-08-19
Attending: INTERNAL MEDICINE
Payer: COMMERCIAL

## 2020-08-19 DIAGNOSIS — R10.32 ABDOMINAL PAIN, LLQ: ICD-10-CM

## 2020-08-19 PROCEDURE — 76705 ECHO EXAM OF ABDOMEN: CPT

## 2020-08-19 PROCEDURE — 74019 RADEX ABDOMEN 2 VIEWS: CPT

## 2020-08-20 LAB
BASOPHILS # BLD AUTO: 0.1 X10E3/UL (ref 0–0.2)
BASOPHILS NFR BLD AUTO: 0 %
EOSINOPHIL # BLD AUTO: 0 X10E3/UL (ref 0–0.4)
EOSINOPHIL NFR BLD AUTO: 0 %
ERYTHROCYTE [DISTWIDTH] IN BLOOD BY AUTOMATED COUNT: 16.5 % (ref 11.6–15.4)
HCT VFR BLD AUTO: 40.7 % (ref 37.5–51)
HGB BLD-MCNC: 13.7 G/DL (ref 13–17.7)
IMM GRANULOCYTES # BLD: 0.4 X10E3/UL (ref 0–0.1)
IMM GRANULOCYTES NFR BLD: 3 %
LYMPHOCYTES # BLD AUTO: 3.7 X10E3/UL (ref 0.7–3.1)
LYMPHOCYTES NFR BLD AUTO: 26 %
MCH RBC QN AUTO: 29.6 PG (ref 26.6–33)
MCHC RBC AUTO-ENTMCNC: 33.7 G/DL (ref 31.5–35.7)
MCV RBC AUTO: 88 FL (ref 79–97)
MONOCYTES # BLD AUTO: 1.1 X10E3/UL (ref 0.1–0.9)
MONOCYTES NFR BLD AUTO: 8 %
NEUTROPHILS # BLD AUTO: 8.6 X10E3/UL (ref 1.4–7)
NEUTROPHILS NFR BLD AUTO: 63 %
PLATELET # BLD AUTO: 400 X10E3/UL (ref 150–450)
RBC # BLD AUTO: 4.63 X10E6/UL (ref 4.14–5.8)
WBC # BLD AUTO: 13.9 X10E3/UL (ref 3.4–10.8)

## 2020-08-25 ENCOUNTER — HOSPITAL ENCOUNTER (OUTPATIENT)
Dept: INFUSION CENTER | Facility: HOSPITAL | Age: 25
Discharge: HOME | End: 2020-08-25
Payer: COMMERCIAL

## 2020-08-25 VITALS
RESPIRATION RATE: 16 BRPM | HEART RATE: 102 BPM | SYSTOLIC BLOOD PRESSURE: 118 MMHG | OXYGEN SATURATION: 98 % | DIASTOLIC BLOOD PRESSURE: 69 MMHG | TEMPERATURE: 98.2 F

## 2020-08-25 PROCEDURE — 96413 CHEMO IV INFUSION 1 HR: CPT

## 2020-08-25 RX ADMIN — VEDOLIZUMAB 300 MG: 300 INJECTION, POWDER, LYOPHILIZED, FOR SOLUTION INTRAVENOUS at 12:58

## 2020-09-02 ENCOUNTER — TRANSCRIBE ORDERS (OUTPATIENT)
Dept: ADMINISTRATIVE | Facility: HOSPITAL | Age: 25
End: 2020-09-02

## 2020-09-02 DIAGNOSIS — K50.919 CROHN'S DISEASE WITH COMPLICATION, UNSPECIFIED GASTROINTESTINAL TRACT LOCATION (HCC): Primary | ICD-10-CM

## 2020-09-17 ENCOUNTER — HOSPITAL ENCOUNTER (INPATIENT)
Facility: HOSPITAL | Age: 25
LOS: 2 days | Discharge: HOME | DRG: 387 | End: 2020-09-19
Attending: EMERGENCY MEDICINE | Admitting: INTERNAL MEDICINE
Payer: COMMERCIAL

## 2020-09-17 ENCOUNTER — APPOINTMENT (EMERGENCY)
Dept: RADIOLOGY | Facility: HOSPITAL | Age: 25
DRG: 387 | End: 2020-09-17
Payer: COMMERCIAL

## 2020-09-17 DIAGNOSIS — R11.2 NAUSEA AND VOMITING: ICD-10-CM

## 2020-09-17 DIAGNOSIS — R10.9 ABDOMINAL PAIN: ICD-10-CM

## 2020-09-17 DIAGNOSIS — K50.10 CROHN'S COLITIS (HCC): Primary | ICD-10-CM

## 2020-09-17 LAB
ALBUMIN SERPL BCP-MCNC: 3.2 G/DL (ref 3.5–5)
ALP SERPL-CCNC: 92 U/L (ref 46–116)
ALT SERPL W P-5'-P-CCNC: 11 U/L (ref 12–78)
ANION GAP SERPL CALCULATED.3IONS-SCNC: 9 MMOL/L (ref 4–13)
AST SERPL W P-5'-P-CCNC: 12 U/L (ref 5–45)
BASOPHILS # BLD AUTO: 0.07 THOUSANDS/ΜL (ref 0–0.1)
BASOPHILS NFR BLD AUTO: 0 % (ref 0–1)
BILIRUB SERPL-MCNC: 0.5 MG/DL (ref 0.2–1)
BUN SERPL-MCNC: 14 MG/DL (ref 5–25)
CALCIUM ALBUM COR SERPL-MCNC: 8.9 MG/DL (ref 8.3–10.1)
CALCIUM SERPL-MCNC: 8.3 MG/DL (ref 8.3–10.1)
CHLORIDE SERPL-SCNC: 104 MMOL/L (ref 100–108)
CO2 SERPL-SCNC: 27 MMOL/L (ref 21–32)
CREAT SERPL-MCNC: 0.96 MG/DL (ref 0.6–1.3)
CRP SERPL QL: 34.3 MG/L
EOSINOPHIL # BLD AUTO: 0 THOUSAND/ΜL (ref 0–0.61)
EOSINOPHIL NFR BLD AUTO: 0 % (ref 0–6)
ERYTHROCYTE [DISTWIDTH] IN BLOOD BY AUTOMATED COUNT: 15.7 % (ref 11.6–15.1)
GFR SERPL CREATININE-BSD FRML MDRD: 127 ML/MIN/1.73SQ M
GLUCOSE SERPL-MCNC: 90 MG/DL (ref 65–140)
HCT VFR BLD AUTO: 44.6 % (ref 36.5–49.3)
HGB BLD-MCNC: 14.7 G/DL (ref 12–17)
IMM GRANULOCYTES # BLD AUTO: 0.21 THOUSAND/UL (ref 0–0.2)
IMM GRANULOCYTES NFR BLD AUTO: 1 % (ref 0–2)
LACTATE SERPL-SCNC: 1 MMOL/L (ref 0.5–2)
LIPASE SERPL-CCNC: 107 U/L (ref 73–393)
LYMPHOCYTES # BLD AUTO: 0.77 THOUSANDS/ΜL (ref 0.6–4.47)
LYMPHOCYTES NFR BLD AUTO: 4 % (ref 14–44)
MCH RBC QN AUTO: 30.3 PG (ref 26.8–34.3)
MCHC RBC AUTO-ENTMCNC: 33 G/DL (ref 31.4–37.4)
MCV RBC AUTO: 92 FL (ref 82–98)
MONOCYTES # BLD AUTO: 0.63 THOUSAND/ΜL (ref 0.17–1.22)
MONOCYTES NFR BLD AUTO: 4 % (ref 4–12)
NEUTROPHILS # BLD AUTO: 16.58 THOUSANDS/ΜL (ref 1.85–7.62)
NEUTS SEG NFR BLD AUTO: 91 % (ref 43–75)
NRBC BLD AUTO-RTO: 0 /100 WBCS
PLATELET # BLD AUTO: 377 THOUSANDS/UL (ref 149–390)
PMV BLD AUTO: 9 FL (ref 8.9–12.7)
POTASSIUM SERPL-SCNC: 3.8 MMOL/L (ref 3.5–5.3)
PROT SERPL-MCNC: 7.4 G/DL (ref 6.4–8.2)
RBC # BLD AUTO: 4.85 MILLION/UL (ref 3.88–5.62)
SODIUM SERPL-SCNC: 140 MMOL/L (ref 136–145)
WBC # BLD AUTO: 18.26 THOUSAND/UL (ref 4.31–10.16)

## 2020-09-17 PROCEDURE — 83690 ASSAY OF LIPASE: CPT | Performed by: EMERGENCY MEDICINE

## 2020-09-17 PROCEDURE — 99285 EMERGENCY DEPT VISIT HI MDM: CPT | Performed by: EMERGENCY MEDICINE

## 2020-09-17 PROCEDURE — G1004 CDSM NDSC: HCPCS

## 2020-09-17 PROCEDURE — 36415 COLL VENOUS BLD VENIPUNCTURE: CPT | Performed by: EMERGENCY MEDICINE

## 2020-09-17 PROCEDURE — 96361 HYDRATE IV INFUSION ADD-ON: CPT

## 2020-09-17 PROCEDURE — 74177 CT ABD & PELVIS W/CONTRAST: CPT

## 2020-09-17 PROCEDURE — 96375 TX/PRO/DX INJ NEW DRUG ADDON: CPT

## 2020-09-17 PROCEDURE — 80053 COMPREHEN METABOLIC PANEL: CPT | Performed by: EMERGENCY MEDICINE

## 2020-09-17 PROCEDURE — 99285 EMERGENCY DEPT VISIT HI MDM: CPT

## 2020-09-17 PROCEDURE — 86140 C-REACTIVE PROTEIN: CPT | Performed by: EMERGENCY MEDICINE

## 2020-09-17 PROCEDURE — 87040 BLOOD CULTURE FOR BACTERIA: CPT | Performed by: EMERGENCY MEDICINE

## 2020-09-17 PROCEDURE — 85025 COMPLETE CBC W/AUTO DIFF WBC: CPT | Performed by: EMERGENCY MEDICINE

## 2020-09-17 PROCEDURE — 99223 1ST HOSP IP/OBS HIGH 75: CPT | Performed by: INTERNAL MEDICINE

## 2020-09-17 PROCEDURE — 83605 ASSAY OF LACTIC ACID: CPT | Performed by: EMERGENCY MEDICINE

## 2020-09-17 PROCEDURE — 96374 THER/PROPH/DIAG INJ IV PUSH: CPT

## 2020-09-17 RX ORDER — METHYLPREDNISOLONE SODIUM SUCCINATE 40 MG/ML
20 INJECTION, POWDER, LYOPHILIZED, FOR SOLUTION INTRAMUSCULAR; INTRAVENOUS 3 TIMES DAILY
Status: DISCONTINUED | OUTPATIENT
Start: 2020-09-17 | End: 2020-09-19 | Stop reason: HOSPADM

## 2020-09-17 RX ORDER — METHYLPREDNISOLONE SODIUM SUCCINATE 125 MG/2ML
125 INJECTION, POWDER, LYOPHILIZED, FOR SOLUTION INTRAMUSCULAR; INTRAVENOUS ONCE
Status: COMPLETED | OUTPATIENT
Start: 2020-09-17 | End: 2020-09-17

## 2020-09-17 RX ORDER — SODIUM CHLORIDE 9 MG/ML
125 INJECTION, SOLUTION INTRAVENOUS CONTINUOUS
Status: DISCONTINUED | OUTPATIENT
Start: 2020-09-17 | End: 2020-09-19 | Stop reason: HOSPADM

## 2020-09-17 RX ORDER — PANTOPRAZOLE SODIUM 40 MG/1
40 TABLET, DELAYED RELEASE ORAL
Status: DISCONTINUED | OUTPATIENT
Start: 2020-09-18 | End: 2020-09-18

## 2020-09-17 RX ORDER — ONDANSETRON 2 MG/ML
4 INJECTION INTRAMUSCULAR; INTRAVENOUS EVERY 6 HOURS PRN
Status: DISCONTINUED | OUTPATIENT
Start: 2020-09-17 | End: 2020-09-19 | Stop reason: HOSPADM

## 2020-09-17 RX ORDER — MORPHINE SULFATE 4 MG/ML
4 INJECTION, SOLUTION INTRAMUSCULAR; INTRAVENOUS ONCE
Status: COMPLETED | OUTPATIENT
Start: 2020-09-17 | End: 2020-09-17

## 2020-09-17 RX ORDER — ONDANSETRON 2 MG/ML
4 INJECTION INTRAMUSCULAR; INTRAVENOUS ONCE
Status: COMPLETED | OUTPATIENT
Start: 2020-09-17 | End: 2020-09-17

## 2020-09-17 RX ORDER — ACETAMINOPHEN 325 MG/1
650 TABLET ORAL EVERY 6 HOURS PRN
Status: DISCONTINUED | OUTPATIENT
Start: 2020-09-17 | End: 2020-09-19 | Stop reason: HOSPADM

## 2020-09-17 RX ADMIN — MORPHINE SULFATE 4 MG: 4 INJECTION INTRAVENOUS at 15:12

## 2020-09-17 RX ADMIN — IOHEXOL 100 ML: 350 INJECTION, SOLUTION INTRAVENOUS at 13:55

## 2020-09-17 RX ADMIN — METHYLPREDNISOLONE SODIUM SUCCINATE 20 MG: 40 INJECTION, POWDER, FOR SOLUTION INTRAMUSCULAR; INTRAVENOUS at 21:14

## 2020-09-17 RX ADMIN — SODIUM CHLORIDE 125 ML/HR: 0.9 INJECTION, SOLUTION INTRAVENOUS at 18:14

## 2020-09-17 RX ADMIN — PIPERACILLIN SODIUM AND TAZOBACTAM SODIUM 3.38 G: 3; .375 INJECTION, POWDER, LYOPHILIZED, FOR SOLUTION INTRAVENOUS at 15:01

## 2020-09-17 RX ADMIN — SODIUM CHLORIDE 500 ML: 0.9 INJECTION, SOLUTION INTRAVENOUS at 13:01

## 2020-09-17 RX ADMIN — METHYLPREDNISOLONE SODIUM SUCCINATE 125 MG: 125 INJECTION, POWDER, FOR SOLUTION INTRAMUSCULAR; INTRAVENOUS at 15:12

## 2020-09-17 RX ADMIN — ONDANSETRON 4 MG: 2 INJECTION INTRAMUSCULAR; INTRAVENOUS at 12:37

## 2020-09-17 NOTE — PLAN OF CARE
Problem: GASTROINTESTINAL - ADULT  Goal: Minimal or absence of nausea and/or vomiting  Description: INTERVENTIONS:  - Administer IV fluids if ordered to ensure adequate hydration  - Maintain NPO status until nausea and vomiting are resolved  - Nasogastric tube if ordered  - Administer ordered antiemetic medications as needed  - Provide nonpharmacologic comfort measures as appropriate  - Advance diet as tolerated, if ordered  - Consider nutrition services referral to assist patient with adequate nutrition and appropriate food choices  Outcome: Progressing  Goal: Maintains or returns to baseline bowel function  Description: INTERVENTIONS:  - Assess bowel function  - Encourage oral fluids to ensure adequate hydration  - Administer IV fluids if ordered to ensure adequate hydration  - Administer ordered medications as needed  - Encourage mobilization and activity  - Consider nutritional services referral to assist patient with adequate nutrition and appropriate food choices  Outcome: Progressing  Goal: Maintains adequate nutritional intake  Description: INTERVENTIONS:  - Monitor percentage of each meal consumed  - Identify factors contributing to decreased intake, treat as appropriate  - Assist with meals as needed  - Monitor I&O, weight, and lab values if indicated  - Obtain nutrition services referral as needed  Outcome: Progressing

## 2020-09-17 NOTE — H&P
H&P- Genesis Nolan 1995, 26 y o  male MRN: [id number]    Unit/Bed#: [facility] Encounter: [id number]    Primary Care Provider: Lashawn Lynch   Date and time admitted to hospital: 9/17/2020 11:52 AM        * Exacerbation of Crohn's disease ([facility] [address] )  Assessment & Plan  Patient presented with worsening abdominal pain since yesterday  Patient noted to have [place] count of 38[address] but improved CRP level  CT scan of the abdomen showed progression of extensive inflammatory changes involving the ascending colon  Patient had a hospitalization about a month and half ago for Crohn's exacerbation and was discharged on prednisone taper  Patient currently on prednisone 20 milligram p o  Daily  Patient followed up with his primary gastroenterologist who recommended MRI of the abdomen  Patient will be started back on Solu-Medrol 20 milligram IV q 8 hours  Full liquid diet as tolerated  GI was consulted  Patient already on Entyvio as outpatient    Leukocytosis  Assessment & Plan  Patient not have worsening white count which could be related to steroids  Patient has no fever or diarrhea  Doubtful infections  Hold off on antibiotics at the current time    Clostridium difficile carrier  Assessment & Plan  Patient has known history of C diff colonization  Patient currently has no diarrhea or fever  VTE Prophylaxis: Low risk  / reason for no mechanical VTE prophylaxis Low risk   Code Status:  Full code    Anticipated Length of Stay:  Patient will be admitted on an Inpatient basis with an anticipated length of stay of  more than 2 midnights  Justification for Hospital Stay:  Crohn's exacerbation    Total Time for Visit, including Counseling / Coordination of Care: 1 hour  Greater than 50% of this total time spent on direct patient counseling and coordination of care      Chief Complaint:   Abdominal    History of Present Illness:    Genesis Nolan is a 26 y o  male with past medical history of Crohn's disease and asthma who presents with abdominal pain  Patient had recent hospitalization about a month and half ago for Crohn's exacerbation and was discharged on prednisone taper  Patient currently on 20 milligrams of prednisone which has been taking  Patient followed up with his primary gastroenterologist as outpatient who recommended MRI of the abdomen  Patient is on Entyvio as outpatient every 8 weeks  Patient presented with worsening abdominal pain since last night and had some nausea and vomiting this morning  Patient denies any diarrhea but is having formed bowel movements  Patient otherwise denies any fever or chills  In the ED patient's vital signs were stable  Labs showed elevated white count of 38835 and CT scan showed worsening inflammatory changes of the ascending colon    Review of Systems:    Review of Systems   Constitutional: Negative for chills, diaphoresis, fatigue and unexpected weight change  HENT: Negative for congestion, ear discharge, ear pain, facial swelling, hearing loss, mouth sores, nosebleeds, postnasal drip, rhinorrhea, sinus pressure, sneezing, sore throat, tinnitus, trouble swallowing and voice change  Eyes: Negative for photophobia, discharge, redness and visual disturbance  Respiratory: Negative for cough, chest tightness, shortness of breath, wheezing and stridor  Cardiovascular: Negative for chest pain, palpitations and leg swelling  Gastrointestinal: Positive for abdominal pain, nausea and vomiting  Negative for abdominal distention, anal bleeding, blood in stool, constipation and diarrhea  Endocrine: Negative for polydipsia, polyphagia and polyuria  Genitourinary: Negative for decreased urine volume, difficulty urinating, dysuria, flank pain, frequency, hematuria and urgency  Musculoskeletal: Negative for arthralgias, back pain and neck stiffness  Skin: Negative for pallor and rash     Neurological: Negative for dizziness, seizures, facial asymmetry, speech difficulty, light-headedness, numbness and headaches  Hematological: Negative for adenopathy  Does not bruise/bleed easily  Psychiatric/Behavioral: Negative for agitation and confusion  Past Medical and Surgical History:     Past Medical History:   Diagnosis Date    Asthma     Crohn disease ([facility] [address] )        History reviewed  No pertinent surgical history  Meds/Allergies:    Prior to Admission medications    Medication Sig Start Date End Date Taking? Authorizing Provider   dicyclomine (BENTYL) 10 mg capsule Take 1 tablet 3 times a day with meals as needed for abdominal pain, cramps, spasm  Patient taking differently: as needed Take 1 tablet 3 times a day with meals as needed for abdominal pain, cramps, spasm 8/14/20  Yes Marcelina Taylor DO   omeprazole (PriLOSEC) 40 MG capsule Take 40 mg by mouth daily   Yes Historical Provider, MD   predniSONE 10 mg tablet Take 4 tablets (40 mg total) by mouth daily for 7 days, THEN 3 5 tablets (35 mg total) daily for 7 days, THEN 3 tablets (30 mg total) daily for 7 days, THEN 2 5 tablets (25 mg total) daily for 7 days, THEN 2 tablets (20 mg total) daily for 7 days, THEN 1 5 tablets (15 mg total) daily for 7 days, THEN 1 tablet (10 mg total) daily for 7 days, THEN 0 5 tablets (5 mg total) daily for 7 days  8/15/20 10/10/20 Yes Marcelina Taylor DO   vedolizumab (Entyvio) 300 MG SOLR Infuse 300 mg into a venous catheter Every 8 weeks   Yes Historical Provider, MD   acetaminophen (TYLENOL) 325 mg tablet Take 2 tablets (650 mg total) by mouth every 6 (six) hours as needed for fever  Patient not taking: Reported on 9/17/2020 7/25/20 9/17/20  Darlin Amador MD     I have reviewed home medications with patient personally      Allergies: No Known Allergies    Social History:     Marital Status: Single     Substance Use History:   Social History     Substance and Sexual Activity   Alcohol Use Never    Frequency: Never    Drinks per session: Patient refused    Binge frequency: Never Social History     Tobacco Use   Smoking Status Never Smoker   Smokeless Tobacco Never Used     Social History     Substance and Sexual Activity   Drug Use Never       Family History:    Family History   Problem Relation Age of Onset    Asthma Mother        Physical Exam:     Vitals:   Blood Pressure: 120/68 (09/17/20 1721)  Pulse: 96 (09/17/20 1721)  Temperature: 98 5 °F (36 9 °C) (09/17/20 1721)  Temp Source: Tympanic (09/17/20 1500)  Respirations: 19 (09/17/20 1721)  Height: 5' 5" (165 1 cm) (09/17/20 1721)  Weight - Scale: 51 1 kg (112 lb 10 5 oz) (09/17/20 1721)  SpO2: 99 % (09/17/20 1721)    Physical Exam  Constitutional:       Appearance: Normal appearance  HENT:      Head: Normocephalic and atraumatic  Eyes:      Extraocular Movements: Extraocular movements intact  Pupils: Pupils are equal, round, and reactive to light  Neck:      Musculoskeletal: Normal range of motion and neck supple  Cardiovascular:      Rate and Rhythm: Normal rate and regular rhythm  Heart sounds: No murmur  No gallop  Pulmonary:      Effort: Pulmonary effort is normal       Breath sounds: Normal breath sounds  Abdominal:      General: Bowel sounds are normal       Palpations: Abdomen is soft  Tenderness: There is abdominal tenderness  Comments: Moderate tenderness in the right side of the abdomen   Musculoskeletal: Normal range of motion  General: No swelling or deformity  Skin:     General: Skin is warm and dry  Neurological:      General: No focal deficit present  Mental Status: He is alert               Additional Data:     Lab Results: I have personally reviewed pertinent films in PACS    Results from last 7 days   Lab Units 09/17/20  1238   WBC Thousand/uL 18 26*   HEMOGLOBIN g/dL 14 7   HEMATOCRIT % 44 6   PLATELETS Thousands/uL 377   NEUTROS PCT % 91*   LYMPHS PCT % 4*   MONOS PCT % 4   EOS PCT % 0     Results from last 7 days   Lab Units 09/17/20  1305   SODIUM mmol/L 140 POTASSIUM mmol/L 3 8   CHLORIDE mmol/L 104   CO2 mmol/L 27   BUN mg/dL 14   CREATININE mg/dL 0 96   ANION GAP mmol/L 9   CALCIUM mg/dL 8 3   ALBUMIN g/dL 3 2*   TOTAL BILIRUBIN mg/dL 0 50   ALK PHOS U/L 92   ALT U/L 11*   AST U/L 12   GLUCOSE RANDOM mg/dL 90                 Results from last 7 days   Lab Units 09/17/20  1500   LACTIC ACID mmol/L 1 0       Imaging: I have personally reviewed pertinent films in PACS    CT abdomen pelvis with contrast   Final Result by Xavier Garcia MD (09/17 1414)      Progression of extensive inflammatory changes involving the ascending colon consistent with the history of Crohn's disease            Workstation performed: [id number]             EKG, Pathology, and Other Studies Reviewed on Admission:   · EKG:     Allscripts / Epic Records Reviewed: Yes     ** Please Note: This note has been constructed using a voice recognition system   **

## 2020-09-17 NOTE — ASSESSMENT & PLAN NOTE
Patient not have worsening white count which could be related to steroids  Patient has no fever or diarrhea  Doubtful infections  Hold off on antibiotics at the current time

## 2020-09-17 NOTE — ED PROVIDER NOTES
History  Chief Complaint   Patient presents with    Abdominal Pain     c/o chrohns flare up, worse over past 24 hours or so with vomiting now  no diarrhea     Patient is a 81-year-old male with a past medical history significant for fistulizing Crohn's disease of small and large intestine on Entyvio in outpatient setting, steroid taper (prednisone 40 -> now on 20mg) started during most recent admission for Chrohn's flare on 8/14/2020, and bentyl who presents with vomiting and acutely worsened abdominal pain x 12 hours  Patient reports that since his discharge he has been doing ok overall, but some days are worse than others with pain and nausea  Patient reports that last night he developed worsening left sided abdominal pain that felt like a crohn's flare  This morning he woke up and had an episode of non-bloody, non-bilious emesis  Had a normal BM this morning  Denies fevers, chills, blood in stools, diarrhea, constipation, chest pain, shortness of breath  Prior to Admission Medications   Prescriptions Last Dose Informant Patient Reported? Taking?   dicyclomine (BENTYL) 10 mg capsule Past Week at Unknown time  No Yes   Sig: Take 1 tablet 3 times a day with meals as needed for abdominal pain, cramps, spasm   Patient taking differently: as needed Take 1 tablet 3 times a day with meals as needed for abdominal pain, cramps, spasm   omeprazole (PriLOSEC) 40 MG capsule 9/17/2020 at Unknown time Self Yes Yes   Sig: Take 40 mg by mouth daily   predniSONE 10 mg tablet 9/17/2020 at Unknown time  No Yes   Sig: Take 4 tablets (40 mg total) by mouth daily for 7 days, THEN 3 5 tablets (35 mg total) daily for 7 days, THEN 3 tablets (30 mg total) daily for 7 days, THEN 2 5 tablets (25 mg total) daily for 7 days, THEN 2 tablets (20 mg total) daily for 7 days, THEN 1 5 tablets (15 mg total) daily for 7 days, THEN 1 tablet (10 mg total) daily for 7 days, THEN 0 5 tablets (5 mg total) daily for 7 days     vedolizumab (Entyvio) 300 MG SOLR Past Month at Unknown time  Yes Yes   Sig: Infuse 300 mg into a venous catheter Every 8 weeks      Facility-Administered Medications: None       Past Medical History:   Diagnosis Date    Asthma     Crohn disease ([facility] [address] )        History reviewed  No pertinent surgical history  Family History   Problem Relation Age of Onset    Asthma Mother      I have reviewed and agree with the history as documented  E-Cigarette/Vaping    E-Cigarette Use Never User      E-Cigarette/Vaping Substances    Nicotine No     THC No     CBD No     Flavoring No     Other No     Unknown No      Social History     Tobacco Use    Smoking status: Never Smoker    Smokeless tobacco: Never Used   Substance Use Topics    Alcohol use: Never     Frequency: Never     Drinks per session: Patient refused     Binge frequency: Never    Drug use: Never       Review of Systems   Constitutional: Negative for chills and fever  HENT: Negative for congestion and rhinorrhea  Eyes: Negative for photophobia and visual disturbance  Respiratory: Negative for chest tightness and shortness of breath  Cardiovascular: Negative for chest pain, palpitations and leg swelling  Gastrointestinal: Positive for abdominal pain and nausea  Negative for abdominal distention, blood in stool, constipation, diarrhea, rectal pain and vomiting  Genitourinary: Negative for dysuria, flank pain and hematuria  Musculoskeletal: Negative for back pain and neck pain  Skin: Negative for pallor and rash  Neurological: Negative for dizziness, light-headedness and headaches  Physical Exam  Physical Exam  Vitals signs and nursing note reviewed  Constitutional:       General: He is not in acute distress  Appearance: He is well-developed and underweight  He is not ill-appearing, toxic-appearing or diaphoretic  HENT:      Head: Normocephalic and atraumatic        Right Ear: External ear normal       Left Ear: External ear normal  Nose: Nose normal       Mouth/Throat:      Pharynx: No oropharyngeal exudate or posterior oropharyngeal erythema  Eyes:      Extraocular Movements: Extraocular movements intact  Conjunctiva/sclera: Conjunctivae normal       Pupils: Pupils are equal, round, and reactive to light  Neck:      Musculoskeletal: Normal range of motion and neck supple  Trachea: No tracheal deviation  Cardiovascular:      Rate and Rhythm: Regular rhythm  Tachycardia present  Heart sounds: Normal heart sounds  No murmur  No friction rub  No gallop  Pulmonary:      Effort: Pulmonary effort is normal  No respiratory distress  Breath sounds: Normal breath sounds  No stridor  No wheezing, rhonchi or rales  Chest:      Chest wall: No tenderness  Abdominal:      General: Bowel sounds are normal  There is no distension  Palpations: Abdomen is soft  There is no mass  Tenderness: There is abdominal tenderness in the right lower quadrant and left upper quadrant  There is no right CVA tenderness or left CVA tenderness  Hernia: No hernia is present  Musculoskeletal: Normal range of motion  General: No tenderness  Lymphadenopathy:      Cervical: No cervical adenopathy  Skin:     General: Skin is warm and dry  Capillary Refill: Capillary refill takes less than 2 seconds  Coloration: Skin is not jaundiced or pale  Findings: No bruising, erythema, lesion or rash  Neurological:      General: No focal deficit present  Mental Status: He is alert and oriented to person, place, and time     Psychiatric:         Mood and Affect: Mood normal          Behavior: Behavior normal          Vital Signs  ED Triage Vitals [09/17/20 1147]   Temperature Pulse Respirations Blood Pressure SpO2   98 2 °F (36 8 °C) (!) 110 20 132/83 99 %      Temp Source Heart Rate Source Patient Position - Orthostatic VS BP Location FiO2 (%)   Tympanic Monitor Sitting Right arm --      Pain Score       8 Vitals:    09/17/20 1719 09/17/20 1721 09/17/20 1941 09/17/20 2259   BP: 120/68 120/68 119/68 101/61   Pulse: 84 96 90 94   Patient Position - Orthostatic VS:             Visual Acuity      ED Medications  Medications   pantoprazole (PROTONIX) EC tablet 40 mg (has no administration in time range)   sodium chloride 0 9 % infusion (125 mL/hr Intravenous New Bag 9/17/20 1814)   acetaminophen (TYLENOL) tablet 650 mg (has no administration in time range)   ondansetron (ZOFRAN) injection 4 mg (has no administration in time range)   methylPREDNISolone sodium succinate (Solu-MEDROL) injection 20 mg (20 mg Intravenous Given 9/17/20 2114)   sodium chloride 0 9 % bolus 500 mL (0 mL Intravenous Stopped 9/17/20 1412)   ondansetron (ZOFRAN) injection 4 mg (4 mg Intravenous Given 9/17/20 1237)   iohexol (OMNIPAQUE) 350 MG/ML injection (MULTI-DOSE) 100 mL (100 mL Intravenous Given 9/17/20 1355)   piperacillin-tazobactam (ZOSYN) IVPB 3 375 g (0 g Intravenous Stopped 9/17/20 1531)   morphine (PF) 4 mg/mL injection 4 mg (4 mg Intravenous Given 9/17/20 1512)   methylPREDNISolone sodium succinate (Solu-MEDROL) injection 125 mg (125 mg Intravenous Given 9/17/20 1512)       Diagnostic Studies  Results Reviewed     Procedure Component Value Units Date/Time    Blood culture #1 [[id number]] Collected:  09/17/20 1457    Lab Status:  Preliminary result Specimen:  Blood from Arm, Right Updated:  09/17/20 2201     Blood Culture Received in Microbiology Lab  Culture in Progress  Blood culture #2 [[id number]] Collected:  09/17/20 1458    Lab Status:  Preliminary result Specimen:  Blood from Arm, Left Updated:  09/17/20 2201     Blood Culture Received in Microbiology Lab  Culture in Progress      Lactic acid [[id number]]  (Normal) Collected:  09/17/20 1500    Lab Status:  Final result Specimen:  Blood from Arm, Left Updated:  09/17/20 1549     LACTIC ACID 1 0 mmol/L     Narrative:       Result may be elevated if tourniquet was used during collection      Comprehensive metabolic panel [[id number]]  (Abnormal) Collected:  09/17/20 1305    Lab Status:  Final result Specimen:  Blood from Arm, Left Updated:  09/17/20 1328     Sodium 140 mmol/L      Potassium 3 8 mmol/L      Chloride 104 mmol/L      CO2 27 mmol/L      ANION GAP 9 mmol/L      BUN 14 mg/dL      Creatinine 0 96 mg/dL      Glucose 90 mg/dL      Calcium 8 3 mg/dL      Corrected Calcium 8 9 mg/dL      AST 12 U/L      ALT 11 U/L      Alkaline Phosphatase 92 U/L      Total Protein 7 4 g/dL      Albumin 3 2 g/dL      Total Bilirubin 0 50 mg/dL      eGFR 127 ml/min/1 73sq m     Narrative:       [place] guidelines for Chronic Kidney Disease (CKD):     Stage 1 with normal or high GFR (GFR > 90 mL/min/1 73 square meters)    Stage 2 Mild CKD (GFR = 60-89 mL/min/1 73 square meters)    Stage 3A Moderate CKD (GFR = 45-59 mL/min/1 73 square meters)    Stage 3B Moderate CKD (GFR = 30-44 mL/min/1 73 square meters)    Stage 4 Severe CKD (GFR = 15-29 mL/min/1 73 square meters)    Stage 5 End Stage CKD (GFR <15 mL/min/1 73 square meters)  Note: GFR calculation is accurate only with a steady state creatinine    Lipase [[id number]]  (Normal) Collected:  09/17/20 1305    Lab Status:  Final result Specimen:  Blood from Arm, Left Updated:  09/17/20 1328     Lipase 107 u/L     C-reactive protein [[id number]]  (Abnormal) Collected:  09/17/20 1305    Lab Status:  Final result Specimen:  Blood from Arm, Left Updated:  09/17/20 1328     CRP 34 3 mg/L     CBC and differential [[id number]]  (Abnormal) Collected:  09/17/20 1238    Lab Status:  Final result Specimen:  Blood from Arm, Left Updated:  09/17/20 1310     WBC 18 26 Thousand/uL      RBC 4 85 Million/uL      Hemoglobin 14 7 g/dL      Hematocrit 44 6 %      MCV 92 fL      MCH 30 3 pg      MCHC 33 0 g/dL      RDW 15 7 %      MPV 9 0 fL      Platelets 981 Thousands/uL      nRBC 0 /100 WBCs      Neutrophils Relative 91 %      Immat GRANS % 1 % Lymphocytes Relative 4 %      Monocytes Relative 4 %      Eosinophils Relative 0 %      Basophils Relative 0 %      Neutrophils Absolute 16 58 Thousands/µL      Immature Grans Absolute 0 21 Thousand/uL      Lymphocytes Absolute 0 77 Thousands/µL      Monocytes Absolute 0 63 Thousand/µL      Eosinophils Absolute 0 00 Thousand/µL      Basophils Absolute 0 07 Thousands/µL     Narrative: This is an appended report  These results have been appended to a previously verified report  CT abdomen pelvis with contrast   Final Result by Berny Waters MD (09/17 1414)      Progression of extensive inflammatory changes involving the ascending colon consistent with the history of Crohn's disease            Workstation performed: [id number]                    Procedures  Procedures         ED Course  ED Course as of Sep 17 2337   Thu Sep 17, 2020   1331 improving   C-REACTIVE PROTEIN(!): 34 3   1442 Progression of extensive inflammatory changes involving the ascending colon consistent with the history of Crohn's disease                                          MDM  Number of Diagnoses or Management Options  Abdominal pain:   Crohn's colitis [facility]):   Nausea and vomiting:   Diagnosis management comments: Assessment and Plan:   Patient describes left sided abdominal pain and episode of non-bloody, non-bilious vomiting, but on exam he has significant tenderness in the RLQ  Differential includes crohn's flare versus appendicitis  Will obtain labwork and imaging to further assess  Will treat symptomatically and reassess         Disposition  Final diagnoses:   Crohn's colitis ([address] )   Abdominal pain   Nausea and vomiting     Time reflects when diagnosis was documented in both MDM as applicable and the Disposition within this note     Time User Action Codes Description Comment    9/17/2020  3:08 PM Iker Bias [K50 10] Crohn's colitis ([address] )     9/17/2020  3:08 PM Iker Bias [R10 9] Abdominal pain 9/17/2020  3:08 PM Edyta Amaya Add [R11 2] Nausea and vomiting       ED Disposition     ED Disposition Condition Date/Time Comment    Admit Stable Thu Sep 17, 2020  3:08 PM Case was discussed with Dr Kayleen Santoro and the patient's admission status was agreed to be Admission Status: inpatient status to the service of Dr Niharika Cobian   Follow-up Information    None         Current Discharge Medication List      CONTINUE these medications which have NOT CHANGED    Details   dicyclomine (BENTYL) 10 mg capsule Take 1 tablet 3 times a day with meals as needed for abdominal pain, cramps, spasm  Qty: 30 capsule, Refills: 0    Associated Diagnoses: Abdominal pain      omeprazole (PriLOSEC) 40 MG capsule Take 40 mg by mouth daily      predniSONE 10 mg tablet Take 4 tablets (40 mg total) by mouth daily for 7 days, THEN 3 5 tablets (35 mg total) daily for 7 days, THEN 3 tablets (30 mg total) daily for 7 days, THEN 2 5 tablets (25 mg total) daily for 7 days, THEN 2 tablets (20 mg total) daily for 7 days, THEN 1 5 tablets (15 mg total) daily for 7 days, THEN 1 tablet (10 mg total) daily for 7 days, THEN 0 5 tablets (5 mg total) daily for 7 days  Qty: 126 tablet, Refills: 0    Associated Diagnoses: Exacerbation of Crohn's disease (HCC)      vedolizumab (Entyvio) 300 MG SOLR Infuse 300 mg into a venous catheter Every 8 weeks           No discharge procedures on file      PDMP Review     None          ED Provider  Electronically Signed by           Osiel Mackay DO  09/17/20 8585

## 2020-09-17 NOTE — ASSESSMENT & PLAN NOTE
Patient presented with worsening abdominal pain since yesterday  Patient noted to have white count of 65486 but improved CRP level  CT scan of the abdomen showed progression of extensive inflammatory changes involving the ascending colon  Patient had a hospitalization about a month and half ago for Crohn's exacerbation and was discharged on prednisone taper  Patient currently on prednisone 20 milligram p o  Daily    Patient followed up with his primary gastroenterologist who recommended MRI of the abdomen  Patient will be started back on Solu-Medrol 20 milligram IV q 8 hours  Full liquid diet as tolerated  GI was consulted  [facility]

## 2020-09-18 LAB
BASOPHILS # BLD AUTO: 0.04 THOUSANDS/ΜL (ref 0–0.1)
BASOPHILS NFR BLD AUTO: 0 % (ref 0–1)
EOSINOPHIL # BLD AUTO: 0 THOUSAND/ΜL (ref 0–0.61)
EOSINOPHIL NFR BLD AUTO: 0 % (ref 0–6)
ERYTHROCYTE [DISTWIDTH] IN BLOOD BY AUTOMATED COUNT: 15.5 % (ref 11.6–15.1)
HCT VFR BLD AUTO: 40.8 % (ref 36.5–49.3)
HGB BLD-MCNC: 13.4 G/DL (ref 12–17)
IMM GRANULOCYTES # BLD AUTO: 0.32 THOUSAND/UL (ref 0–0.2)
IMM GRANULOCYTES NFR BLD AUTO: 2 % (ref 0–2)
LYMPHOCYTES # BLD AUTO: 1.46 THOUSANDS/ΜL (ref 0.6–4.47)
LYMPHOCYTES NFR BLD AUTO: 7 % (ref 14–44)
MCH RBC QN AUTO: 30 PG (ref 26.8–34.3)
MCHC RBC AUTO-ENTMCNC: 32.8 G/DL (ref 31.4–37.4)
MCV RBC AUTO: 91 FL (ref 82–98)
MONOCYTES # BLD AUTO: 0.75 THOUSAND/ΜL (ref 0.17–1.22)
MONOCYTES NFR BLD AUTO: 4 % (ref 4–12)
NEUTROPHILS # BLD AUTO: 17.23 THOUSANDS/ΜL (ref 1.85–7.62)
NEUTS SEG NFR BLD AUTO: 87 % (ref 43–75)
NRBC BLD AUTO-RTO: 0 /100 WBCS
PLATELET # BLD AUTO: 381 THOUSANDS/UL (ref 149–390)
PMV BLD AUTO: 8.9 FL (ref 8.9–12.7)
RBC # BLD AUTO: 4.47 MILLION/UL (ref 3.88–5.62)
WBC # BLD AUTO: 19.8 THOUSAND/UL (ref 4.31–10.16)

## 2020-09-18 PROCEDURE — 99254 IP/OBS CNSLTJ NEW/EST MOD 60: CPT | Performed by: INTERNAL MEDICINE

## 2020-09-18 PROCEDURE — 85025 COMPLETE CBC W/AUTO DIFF WBC: CPT | Performed by: INTERNAL MEDICINE

## 2020-09-18 PROCEDURE — 99232 SBSQ HOSP IP/OBS MODERATE 35: CPT | Performed by: INTERNAL MEDICINE

## 2020-09-18 RX ORDER — PANTOPRAZOLE SODIUM 40 MG/1
40 TABLET, DELAYED RELEASE ORAL
Status: DISCONTINUED | OUTPATIENT
Start: 2020-09-18 | End: 2020-09-19 | Stop reason: HOSPADM

## 2020-09-18 RX ADMIN — PANTOPRAZOLE SODIUM 40 MG: 40 TABLET, DELAYED RELEASE ORAL at 07:31

## 2020-09-18 RX ADMIN — SODIUM CHLORIDE 125 ML/HR: 0.9 INJECTION, SOLUTION INTRAVENOUS at 09:12

## 2020-09-18 RX ADMIN — SODIUM CHLORIDE 125 ML/HR: 0.9 INJECTION, SOLUTION INTRAVENOUS at 19:02

## 2020-09-18 RX ADMIN — HYOSCYAMINE SULFATE 0.12 MG: 0.12 TABLET SUBLINGUAL at 21:32

## 2020-09-18 RX ADMIN — HYOSCYAMINE SULFATE 0.12 MG: 0.12 TABLET SUBLINGUAL at 13:22

## 2020-09-18 RX ADMIN — HYOSCYAMINE SULFATE 0.12 MG: 0.12 TABLET SUBLINGUAL at 16:02

## 2020-09-18 RX ADMIN — METHYLPREDNISOLONE SODIUM SUCCINATE 20 MG: 40 INJECTION, POWDER, FOR SOLUTION INTRAMUSCULAR; INTRAVENOUS at 21:32

## 2020-09-18 RX ADMIN — METHYLPREDNISOLONE SODIUM SUCCINATE 20 MG: 40 INJECTION, POWDER, FOR SOLUTION INTRAMUSCULAR; INTRAVENOUS at 10:03

## 2020-09-18 RX ADMIN — METHYLPREDNISOLONE SODIUM SUCCINATE 20 MG: 40 INJECTION, POWDER, FOR SOLUTION INTRAMUSCULAR; INTRAVENOUS at 16:02

## 2020-09-18 NOTE — CONSULTS
Consultation -  [facility]  Jacky Wilson 26 y o  male MRN: [id number]  Unit/Bed#: 2 [place] [address] Encounter: [id number]        Inpatient consult to gastroenterology  Consult performed by: Med Thakur PA-C  Consult ordered by: Evelyn Skinner MD          Reason for Consult / Principal Problem: crohn's colitis    ASSESSMENT and PLAN:    Principal Problem:    Exacerbation of Crohn's disease ([address] )  Active Problems:    Clostridium difficile carrier    Leukocytosis    #1  Fistulizing Crohn's enterocolitis with flare: patient is noted to have worsening inflammation in the ascending colon on CT scan  No diarrhea  CRP is actually improving and has downtrended with every check since March  It is currently 34 3  WBC elevated likely from steroids  Afebrile  -continue IV solumedrol 20 mg TID, will likely need to go on a prolonged taper and possibly keep on 40 mg for a few weeks until tapering  -will start mesalamine in combination with Entyvio, will start delzicol here due to formulary restrictions but would recommend Lialda as outpatient  -continue Entyvio, next infusion at end of October  -will trial levsin for pain, ordered scheduled 4x daily, feels bentyl did not help him  -will d/c c diff test due to lack of diarrhea  -full liquid diet for today, consider advancing to low residue tomorrow   -will need outpatient follow up with Dr Audrey Gonzalez  Patient was questioning trial of CBD oil      -------------------------------------------------------------------------------------------------------------------    HPI: This is a 26year old male with a history of Crohn's disease in the small and large bowel diagnosed approx 10 years ago who presented due to abdominal pain and vomiting  He was recently admitted in July and August for flare symptoms  He recently started [facility] and reports he had his third loading dose 4 weeks ago and is due again at the end of October   He had a reaction to Remicade in the past and was unable to do the Humira injections due to fear of needles  He reports being on Lialda and sulfasalazine in the past but never in combination with a biologic  He reports that he has been having off and on symptoms since his last d/c about 1 month ago  He reports worsening pain for the last week and an episode on nonbloody vomiting yesterday  He denies any diarrhea and reports he had a formed stool without blood yesterday  His pain is worse in the right abdomen  He follows with Dr Leopoldo Blew and reports seeing him about 2 weeks ago in the office  He was on prednisone 20 mg when he came back to the hospital  His CT scan in august showed enterocolitis  This admission it is showing worsening inflammation in the ascending colon  It appears he lost about 16 pounds since March 2020  REVIEW OF SYSTEMS:    CONSTITUTIONAL: Denies any fever, chills, or rigors  Good appetite, +recent weight loss  HEENT: No earache or tinnitus  Denies hearing loss or visual disturbances  CARDIOVASCULAR: No chest pain or palpitations  RESPIRATORY: Denies any cough, hemoptysis, shortness of breath or dyspnea on exertion  GASTROINTESTINAL: As noted in the History of Present Illness  GENITOURINARY: No problems with urination  Denies any hematuria or dysuria  NEUROLOGIC: No dizziness or vertigo, denies headaches  MUSCULOSKELETAL: Denies any muscle or joint pain  SKIN: Denies skin rashes or itching  ENDOCRINE: Denies excessive thirst  Denies intolerance to heat or cold  PSYCHOSOCIAL: Denies depression or anxiety  Denies any recent memory loss  Historical Information   Past Medical History:   Diagnosis Date    Asthma     Crohn disease ([facility] [address] )      History reviewed  No pertinent surgical history    Social History   Social History     Substance and Sexual Activity   Alcohol Use Never    Frequency: Never    Drinks per session: Patient refused    Binge frequency: Never     Social History     Substance and Sexual Activity Drug Use Never     Social History     Tobacco Use   Smoking Status Never Smoker   Smokeless Tobacco Never Used     Family History   Problem Relation Age of Onset    Asthma Mother        Meds/Allergies     Medications Prior to Admission   Medication    dicyclomine (BENTYL) 10 mg capsule    omeprazole (PriLOSEC) 40 MG capsule    predniSONE 10 mg tablet    vedolizumab (Entyvio) 300 MG SOLR     Current Facility-Administered Medications   Medication Dose Route Frequency    acetaminophen (TYLENOL) tablet 650 mg  650 mg Oral Q6H PRN    methylPREDNISolone sodium succinate (Solu-MEDROL) injection 20 mg  20 mg Intravenous TID    ondansetron (ZOFRAN) injection 4 mg  4 mg Intravenous Q6H PRN    pantoprazole (PROTONIX) EC tablet 40 mg  40 mg Oral Early Morning    sodium chloride 0 9 % infusion  125 mL/hr Intravenous Continuous       No Known Allergies        Objective     Blood pressure 98/59, pulse 84, temperature 97 8 °F (36 6 °C), temperature source Tympanic, resp  rate 16, height 5' 5" (1 651 m), weight 51 1 kg (112 lb 10 5 oz), SpO2 99 %  Intake/Output Summary (Last 24 hours) at 9/18/2020 0918  Last data filed at 9/18/2020 8732  Gross per 24 hour   Intake 3289 ml   Output --   Net 3289 ml         PHYSICAL EXAM:      General Appearance:   Alert, cooperative, no distress, appears stated age    HEENT:   Normocephalic, atraumatic, anicteric, no oropharyngeal thrush present      Neck:  Supple, symmetrical, trachea midline, no adenopathy;    thyroid: no enlargement/tenderness/nodules; no carotid  bruit or JVD    Lungs:   Clear to auscultation bilaterally; no rales, rhonchi or wheezing; respirations unlabored    Heart[de-identified]   S1 and S2 normal; regular rate and rhythm; no murmur, rub, or gallop     Abdomen:   Soft, right sided abdominal discomfort to palpation, non-distended; normal bowel sounds; no masses, no organomegaly    Genitalia:   Deferred    Rectal:   Deferred    Extremities:  No cyanosis, clubbing or edema  Pulses:  2+ and symmetric all extremities    Skin:  Skin color, texture, turgor normal, no rashes or lesions    Lymph nodes:  No palpable cervical, axillary or inguinal lymphadenopathy        Lab Results:   Results from last 7 days   Lab Units 09/17/20  1238   WBC Thousand/uL 18 26*   HEMOGLOBIN g/dL 14 7   HEMATOCRIT % 44 6   PLATELETS Thousands/uL 377   NEUTROS PCT % 91*   LYMPHS PCT % 4*   MONOS PCT % 4   EOS PCT % 0     Results from last 7 days   Lab Units 09/17/20  1305   POTASSIUM mmol/L 3 8   CHLORIDE mmol/L 104   CO2 mmol/L 27   BUN mg/dL 14   CREATININE mg/dL 0 96   CALCIUM mg/dL 8 3   ALK PHOS U/L 92   ALT U/L 11*   AST U/L 12         Results from last 7 days   Lab Units 09/17/20  1305   LIPASE u/L 107       Imaging Studies: I have personally reviewed pertinent imaging studies  Ct Abdomen Pelvis With Contrast    Result Date: 9/17/2020  Impression: Progression of extensive inflammatory changes involving the ascending colon consistent with the history of Crohn's disease Workstation performed: [id number]           Patient was seen and examined by Dr Robin Bush  All ravi medical decisions were made by Dr Robin Bush  Thank you for allowing us to participate in the care of this present patient  We will follow-up with you closely

## 2020-09-18 NOTE — ASSESSMENT & PLAN NOTE
Patient has persistent elevation of white count likely secondary steroids  Doubtful infection  Patient has no fever or diarrhea  Hold off on antibiotics at the current time

## 2020-09-18 NOTE — ASSESSMENT & PLAN NOTE
Patient presented with worsening abdominal pain for 1 day  Patient noted to have [place] of [address] but improved CRP level  CT scan of the abdomen showed progression of extensive inflammatory changes involving the ascending colon  Patient had a hospitalization about a month and half ago for Crohn's exacerbation and was discharged on prednisone taper  Patient currently on prednisone 20 milligram p o  Daily  Patient followed up with his primary gastroenterologist who recommended MRI of the abdomen  Continue Solu-Medrol 20 milligram IV q 8 hours  Patient is on full liquid diet which will be continued  GI was consulted and appreciate recommendations  Patient already on Entyvio as outpatient  Discussed with GI-patient might need prolonged paper with prednisone  GI also recommending to start mesalamine in combination with Entyvio  Trial of Levsin for pain  Doubtful infection as patient has no diarrhea

## 2020-09-18 NOTE — PROGRESS NOTES
Progress Note - Al Torres 1995, 26 y o  male MRN: [id number]    Unit/Bed#: [facility] Encounter: [id number]    Primary Care Provider: Shaun Lopez   Date and time admitted to hospital: 9/17/2020 11:52 AM        * Exacerbation of Crohn's disease ([facility] [address] )  Assessment & Plan  Patient presented with worsening abdominal pain for 1 day  Patient noted to have [place] of [address] but improved CRP level  CT scan of the abdomen showed progression of extensive inflammatory changes involving the ascending colon  Patient had a hospitalization about a month and half ago for Crohn's exacerbation and was discharged on prednisone taper  Patient currently on prednisone 20 milligram p o  Daily  Patient followed up with his primary gastroenterologist who recommended MRI of the abdomen  Continue Solu-Medrol 20 milligram IV q 8 hours  Patient is on full liquid diet which will be continued  GI was consulted and appreciate recommendations  Patient already on Entyvio as outpatient  Discussed with GI-patient might need prolonged paper with prednisone  GI also recommending to start mesalamine in combination with Entyvio  Trial of Levsin for pain  Doubtful infection as patient has no diarrhea  Leukocytosis  Assessment & Plan  Patient has persistent elevation of white count likely secondary steroids  Doubtful infection  Patient has no fever or diarrhea  Hold off on antibiotics at the current time    Clostridium difficile carrier  Assessment & Plan  Patient has known history of C diff colonization  Patient currently has no diarrhea or fever  VTE Pharmacologic Prophylaxis:   Pharmacologic: Low risk  Mechanical VTE Prophylaxis in Place: Yes    Patient Centered Rounds: I have performed bedside rounds with nursing staff today  Discussions with Specialists or Other Care Team Provider: Aki Salazar    Education and Discussions with Family / Patient: yes, Mother Pmaella Daniel    Time Spent for Care: 45 minutes    More than 50% of total time spent on counseling and coordination of care as described above  Current Length of Stay: 1 day(s)    Current Patient Status: Inpatient   Certification Statement: The patient will continue to require additional inpatient hospital stay due to Crohn's exacerbation    Discharge Plan:  Home    Code Status: Level 1 - Full Code      Subjective:   Patient has improved abdominal pain  Patient denies any nausea or vomiting  Patient did not have a bowel movement since admission    Objective:     Vitals:   Temp (24hrs), Av 1 °F (36 7 °C), Min:96 9 °F (36 1 °C), Max:98 6 °F (37 °C)    Temp:  [96 9 °F (36 1 °C)-98 6 °F (37 °C)] 97 8 °F (36 6 °C)  HR:  [] 94  Resp:  [16-20] 16  BP: ()/(59-83) 101/62  SpO2:  [99 %-100 %] 99 %  Body mass index is 18 75 kg/m²  Input and Output Summary (last 24 hours): Intake/Output Summary (Last 24 hours) at 2020 1137  Last data filed at 2020 2281  Gross per 24 hour   Intake 3289 ml   Output --   Net 3289 ml       Physical Exam:     Physical Exam  Constitutional:       General: He is not in acute distress  HENT:      Head: Normocephalic and atraumatic  Eyes:      Conjunctiva/sclera: Conjunctivae normal       Pupils: Pupils are equal, round, and reactive to light  Neck:      Musculoskeletal: Normal range of motion and neck supple  Cardiovascular:      Rate and Rhythm: Normal rate and regular rhythm  Heart sounds: Normal heart sounds  Pulmonary:      Effort: Pulmonary effort is normal  No respiratory distress  Breath sounds: No wheezing, rhonchi or rales  Chest:      Chest wall: No tenderness  Abdominal:      General: Bowel sounds are normal  There is no distension  Palpations: Abdomen is soft  Tenderness: There is no abdominal tenderness  There is no guarding or rebound  Skin:     General: Skin is warm and dry  Findings: No rash  Neurological:      Mental Status: He is alert  Cranial Nerves:  No cranial nerve deficit  Additional Data:     Labs:    Results from last 7 days   Lab Units 09/18/20  0918   WBC Thousand/uL 19 80*   HEMOGLOBIN g/dL 13 4   HEMATOCRIT % 40 8   PLATELETS Thousands/uL 381   NEUTROS PCT % 87*   LYMPHS PCT % 7*   MONOS PCT % 4   EOS PCT % 0     Results from last 7 days   Lab Units 09/17/20  1305   POTASSIUM mmol/L 3 8   CHLORIDE mmol/L 104   CO2 mmol/L 27   BUN mg/dL 14   CREATININE mg/dL 0 96   CALCIUM mg/dL 8 3   ALK PHOS U/L 92   ALT U/L 11*   AST U/L 12           * I Have Reviewed All Lab Data Listed Above  * Additional Pertinent Lab Tests Reviewed: All [place] Admission Reviewed        Recent Cultures (last 7 days):     Results from last 7 days   Lab Units 09/17/20  1458 09/17/20  1457   BLOOD CULTURE  Received in Microbiology Lab  Culture in Progress  Received in Microbiology Lab  Culture in Progress  Last 24 Hours Medication List:   Current Facility-Administered Medications   Medication Dose Route Frequency Provider Last Rate    acetaminophen  650 mg Oral Q6H PRN Courtney Segovia MD      hyoscyamine  0 125 mg Sublingual 4x Daily (AC & HS) Latricia Davies PA-C      methylPREDNISolone sodium succinate  20 mg Intravenous TID Courtney Segovia MD      ondansetron  4 mg Intravenous Q6H PRN Courtney Segovia MD      pantoprazole  40 mg Oral Early Morning Courteny Segovia MD      sodium chloride  125 mL/hr Intravenous Continuous Courtney Segovia  mL/hr (09/18/20 0912)        Today, Patient Was Seen By: Courtney Segovia MD    ** Please Note: Dictation voice to text software may have been used in the creation of this document   **

## 2020-09-18 NOTE — PLAN OF CARE
Problem: GASTROINTESTINAL - ADULT  Goal: Minimal or absence of nausea and/or vomiting  Description: INTERVENTIONS:  - Administer IV fluids if ordered to ensure adequate hydration  - Maintain NPO status until nausea and vomiting are resolved  - Nasogastric tube if ordered  - Administer ordered antiemetic medications as needed  - Provide nonpharmacologic comfort measures as appropriate  - Advance diet as tolerated, if ordered  - Consider nutrition services referral to assist patient with adequate nutrition and appropriate food choices  Outcome: Progressing  Goal: Maintains or returns to baseline bowel function  Description: INTERVENTIONS:  - Assess bowel function  - Encourage oral fluids to ensure adequate hydration  - Administer IV fluids if ordered to ensure adequate hydration  - Administer ordered medications as needed  - Encourage mobilization and activity  - Consider nutritional services referral to assist patient with adequate nutrition and appropriate food choices  Outcome: Progressing  Goal: Maintains adequate nutritional intake  Description: INTERVENTIONS:  - Monitor percentage of each meal consumed  - Identify factors contributing to decreased intake, treat as appropriate  - Assist with meals as needed  - Monitor I&O, weight, and lab values if indicated  - Obtain nutrition services referral as needed  Outcome: Progressing  Goal: Establish and maintain optimal ostomy function  Description: INTERVENTIONS:  - Assess bowel function  - Encourage oral fluids to ensure adequate hydration  - Administer IV fluids if ordered to ensure adequate hydration   - Administer ordered medications as needed  - Encourage mobilization and activity  - Nutrition services referral to assist patient with appropriate food choices  - Assess stoma site  - Consider wound care consult   Outcome: Progressing

## 2020-09-18 NOTE — UTILIZATION REVIEW
Initial Clinical Review    Admission: Date/Time/Statement:   Admission Orders (From admission, onward)     Ordered        09/17/20 1509  Inpatient Admission  Once                   Orders Placed This Encounter   Procedures    Inpatient Admission     Standing Status:   Standing     Number of Occurrences:   1     Order Specific Question:   Admitting Physician     Answer:   Binta Morillo     Order Specific Question:   Level of Care     Answer:   Med Surg [16]     Order Specific Question:   Estimated length of stay     Answer:   More than 2 Midnights     Order Specific Question:   Certification     Answer:   I certify that inpatient services are medically necessary for this patient for a duration of greater than two midnights  See H&P and MD Progress Notes for additional information about the patient's course of treatment  ED Arrival Information     Expected Arrival Acuity Means of Arrival Escorted By Service Admission Type    - 9/17/2020 11:05 Urgent Walk-In Self General Medicine Urgent    Arrival Complaint    Chrohn's Flare-up        Chief Complaint   Patient presents with    Abdominal Pain     c/o chrohns flare up, worse over past 24 hours or so with vomiting now  no diarrhea     Assessment/Plan:  26 y o  male with past medical history of Crohn's disease and asthma who presents with abdominal pain  Patient currently on 20 milligrams of prednisone which has been taking  Patient is on Entyvio as outpatient every 8 weeks  Patient presented with worsening abdominal pain since last night and had some nausea and vomiting this morning  Labs showed elevated [place] of [address] and CT scan showed worsening inflammatory changes of the ascending colon  Patient admitted inpatient Exacerbation of Crohn's disease  Continue IV Solumedrol 20 g q 8hr  Full liquid diet as tolerated  Consult GI  Leukocytosis no fever will hold off abx for now   Cdiff carrier known history colonization currently no diarrhea  GI Farhanino Gowers Crohn's enterocolitis with flare: patient is noted to have worsening inflammation in the ascending colon on CT scan    CRP 34 3 continue IV Solumedrol tid, IVF @125, will likely need to go on a prolonged taper and possibly keep on 40 mg for a few weeks until tapering  -will start mesalamine in combination with Entyvio, will start delzicol here due to formulary restrictions but would recommend Lialda as outpatient  -continue Entyvio, next infusion at end of October  -will trial levsin for pain, ordered scheduled 4x daily, feels bentyl did not help him  -will d/c c diff test due to lack of diarrhea  -full liquid diet for today  ED Triage Vitals [09/17/20 1147]   Temperature Pulse Respirations Blood Pressure SpO2   98 2 °F (36 8 °C) (!) 110 20 132/83 99 %      Temp Source Heart Rate Source Patient Position - Orthostatic VS BP Location FiO2 (%)   Tympanic Monitor Sitting Right arm --      Pain Score       8          Wt Readings from Last 1 Encounters:   09/17/20 51 1 kg (112 lb 10 5 oz)     Additional Vital Signs:   17/20 19:41:06   98 6 °F (37 °C)   90   18   119/68   85   100 %   --   --    09/17/20 17:21:40   98 5 °F (36 9 °C)   96   19   120/68   85   99 %   None (Room air)   --    09/17/20 17:19:54   98 5 °F (36 9 °C)   84   --   120/68   85   99 %   --   --    09/17/20 1500   96 9 °F (36 1 °C)Abnormal     96   20   117/77   92   100 %          Pertinent Labs/Diagnostic Test Results:   9/17 CT abdomen pelvis Progression of extensive inflammatory changes involving the ascending colon consistent with the history of Crohn's disease  Results from last 7 days   Lab Units 09/18/20  0918 09/17/20  1238   WBC Thousand/uL 19 80* 18 26*   HEMOGLOBIN g/dL 13 4 14 7   HEMATOCRIT % 40 8 44 6   PLATELETS Thousands/uL 381 377   NEUTROS ABS Thousands/µL 17 23* 16 58*     Results from last 7 days   Lab Units 09/17/20  1305   SODIUM mmol/L 140   POTASSIUM mmol/L 3 8   CHLORIDE mmol/L 104   CO2 mmol/L 27   ANION GAP mmol/L 9   BUN mg/dL 14   CREATININE mg/dL 0 96   EGFR ml/min/1 73sq m 127   CALCIUM mg/dL 8 3     Results from last 7 days   Lab Units 09/17/20  1305   AST U/L 12   ALT U/L 11*   ALK PHOS U/L 92   TOTAL PROTEIN g/dL 7 4   ALBUMIN g/dL 3 2*   TOTAL BILIRUBIN mg/dL 0 50     Results from last 7 days   Lab Units 09/17/20  1305   GLUCOSE RANDOM mg/dL 90     Results from last 7 days   Lab Units 09/17/20  1500   LACTIC ACID mmol/L 1 0     Results from last 7 days   Lab Units 09/17/20  1305   LIPASE u/L 107     Results from last 7 days   Lab Units 09/17/20  1305   CRP mg/L 34 3*     Results from last 7 days   Lab Units 09/17/20  1458 09/17/20  1457   BLOOD CULTURE  Received in Microbiology Lab  Culture in Progress  Received in Microbiology Lab  Culture in Progress       ED Treatment:   Medication Administration from 09/17/2020 1105 to 09/17/2020 1711       Date/Time Order Dose Route Action Action by Comments     09/17/2020 1412 sodium chloride 0 9 % bolus 500 mL 0 mL Intravenous Stopped Meño Henning RN      09/17/2020 1301 sodium chloride 0 9 % bolus 500 mL 500 mL Intravenous [address] Meño Henning RN      09/17/2020 1237 ondansetron (ZOFRAN) injection 4 mg 4 mg Intravenous Given Chris Yoo RN      09/17/2020 1355 iohexol (OMNIPAQUE) 350 MG/ML injection (MULTI-DOSE) 100 mL 100 mL Intravenous Given Magalis Coles      09/17/2020 1531 piperacillin-tazobactam (ZOSYN) IVPB 3 375 g 0 g Intravenous Stopped Meño Henning RN      09/17/2020 1501 piperacillin-tazobactam (ZOSYN) IVPB 3 375 g 3 375 g Intravenous [address] Meño Henning RN      09/17/2020 1512 morphine (PF) 4 mg/mL injection 4 mg 4 mg Intravenous Given Chris Yoo RN      09/17/2020 1512 methylPREDNISolone sodium succinate (Solu-MEDROL) injection 125 mg 125 mg Intravenous Given Chris Yoo RN         Past Medical History:   Diagnosis Date    Asthma     Crohn disease ([facility] [facility] )      Present on Admission:   Exacerbation of Crohn's disease ([address] )   Leukocytosis      Admitting Diagnosis: Abdominal pain [R10 9]  Nausea and vomiting [R11 2]  [facility]'s colitis ([facility] ) [K50 10]  Age/Sex: 26 y o  male  Admission Orders:  gmf  Full liquid diet   Scheduled Medications:  hyoscyamine, 0 125 mg, Sublingual, 4x Daily (AC & HS)  methylPREDNISolone sodium succinate, 20 mg, Intravenous, TID  pantoprazole, 40 mg, Oral, Early Morning      Continuous IV Infusions:  sodium chloride, 125 mL/hr, Intravenous, Continuous      PRN Meds:  acetaminophen, 650 mg, Oral, Q6H PRN  ondansetron, 4 mg, Intravenous, Q6H PRN        IP CONSULT TO GASTROENTEROLOGY    Network Utilization Review Department  [email or web address]  ATTENTION: Please call with any questions or concerns to [phone] and carefully listen to the prompts so that you are directed to the right person  All voicemails are confidential   Aimee Bee all requests for admission clinical reviews, approved or denied determinations and any other requests to dedicated fax number below belonging to the campus where the patient is receiving treatment   List of dedicated fax numbers for the Facilities:  [address] DENIALS (Administrative/Medical Necessity) [phone]   [address] (Maternity/NICU/Pediatrics) [phone]   Jyothi Murdock [phone]   Zee Leach [phone]   Laz Moran [phone]   Jeff Lara [phone]   [address] [place] [address] [phone]   [facility]  [phone]   [address] [facility] [address] [facility]  [address] [phone]

## 2020-09-19 VITALS
WEIGHT: 112.66 LBS | HEART RATE: 87 BPM | TEMPERATURE: 99 F | OXYGEN SATURATION: 100 % | SYSTOLIC BLOOD PRESSURE: 125 MMHG | HEIGHT: 65 IN | DIASTOLIC BLOOD PRESSURE: 81 MMHG | BODY MASS INDEX: 18.77 KG/M2 | RESPIRATION RATE: 18 BRPM

## 2020-09-19 PROCEDURE — 99239 HOSP IP/OBS DSCHRG MGMT >30: CPT | Performed by: INTERNAL MEDICINE

## 2020-09-19 RX ORDER — PREDNISONE 20 MG/1
40 TABLET ORAL DAILY
Qty: 28 TABLET | Refills: 0 | Status: SHIPPED | OUTPATIENT
Start: 2020-09-19 | End: 2020-10-05 | Stop reason: HOSPADM

## 2020-09-19 RX ADMIN — HYOSCYAMINE SULFATE 0.12 MG: 0.12 TABLET SUBLINGUAL at 16:17

## 2020-09-19 RX ADMIN — METHYLPREDNISOLONE SODIUM SUCCINATE 20 MG: 40 INJECTION, POWDER, FOR SOLUTION INTRAMUSCULAR; INTRAVENOUS at 08:06

## 2020-09-19 RX ADMIN — PANTOPRAZOLE SODIUM 40 MG: 40 TABLET, DELAYED RELEASE ORAL at 08:06

## 2020-09-19 RX ADMIN — HYOSCYAMINE SULFATE 0.12 MG: 0.12 TABLET SUBLINGUAL at 08:06

## 2020-09-19 RX ADMIN — METHYLPREDNISOLONE SODIUM SUCCINATE 20 MG: 40 INJECTION, POWDER, FOR SOLUTION INTRAMUSCULAR; INTRAVENOUS at 16:17

## 2020-09-19 RX ADMIN — HYOSCYAMINE SULFATE 0.12 MG: 0.12 TABLET SUBLINGUAL at 11:37

## 2020-09-19 NOTE — NURSING NOTE
Discharge instructions given to patient using teach back method  IV access removed  All personal belonging taken with patient

## 2020-09-19 NOTE — DISCHARGE SUMMARY
Discharge- Lorella Scheuermann 1995, 26 y o  male MRN: [id number]    Unit/Bed#: 1101 [facility]  Encounter: [id number]    Primary Care Provider: Robert Stone   Date and time admitted to hospital: 9/17/2020 11:52 AM        * Exacerbation of Crohn's disease ([facility] [address] )  Assessment & Plan  Patient presented with worsening abdominal pain for 1 day  Patient noted to have [place] of [address] but improved CRP level  CT scan of the abdomen showed progression of extensive inflammatory changes involving the ascending colon  Patient had a hospitalization about a month and half ago for Crohn's exacerbation and was discharged on prednisone taper  Patient currently on prednisone 20 milligram p o  Daily  Patient followed up with his primary gastroenterologist who recommended MRI of the abdomen  Patient was on Solu-Medrol 20 milligram IV q 8 hours  Patient is on full liquid diet which will be continued  GI was consulted and appreciate recommendations  Patient already on Entyvio as outpatient  Discussed with GI-patient might need prolonged paper with prednisone  GI also recommending to start mesalamine in combination with Entyvio  Trial of Levsin for pain  Patient to be discharged on prednisone 40 milligram p o  Daily till seen by primary gastroenterologist   Suspecting fixed stricture in the terminal ileum as seen on his last small-bowel series in July  GI recommending repeat small-bowel series in 1-2 weeks  Follow-up outpatient with primary GI    Leukocytosis  Assessment & Plan  Patient has persistent elevation of white count likely secondary steroids  Doubtful infection  Patient has no fever or diarrhea  Hold off on antibiotics at the current time    Clostridium difficile carrier  Assessment & Plan  Patient has known history of C diff colonization  Patient currently has no diarrhea or fever            Discharging Physician / Practitioner: Jackson Velazco MD  PCP: Robert Stone  Admission Date:   Admission Orders (From admission, onward)     Ordered        09/17/20 1509  Inpatient Admission  Once                   Discharge Date: 09/19/20    Resolved Problems  Date Reviewed: 9/19/2020    None          Consultations During Hospital Stay:  · Gastroenterology     Outpatient Tests Requested:  · Follow-up with Vickie latif    Complications:  None    Reason for Admission:  Abdominal pain    Hospital Course:     Seth Avila is a 26 y o  male patient with past medical history of Crohn's disease, asthma who originally presented to the hospital on 9/17/2020 due to worsening abdominal pain for 1 day  CT scan of the abdomen in the ED showed worsening inflammatory changes of the ascending colon  Patient is admitted hospital was seen in consult with GI  Patient was treated with IV Solu-Medrol  Patient's abdominal pain improved significantly  Patient also started on Levsin  Discussed with GI who recommended the patient to continue prednisone 40 milligram p o  Daily till seen by primary GI  Patient also to remain on full liquid diet until seen by GI  Outpatient follow-up with GI regarding repeating small-bowel series      Please see above list of diagnoses and related plan for additional information  Condition at Discharge: stable     Discharge Day Visit / Exam:     Subjective:  Patient has significant improvement of abdominal pain  Patient had mild abdominal pain which is resolved now  Patient is tolerating full liquid diet well  Patient denies any nausea vomiting, diarrhea  Vitals: Blood Pressure: 123/82 (09/19/20 0807)  Pulse: 93 (09/19/20 0807)  Temperature: 98 2 °F (36 8 °C) (09/19/20 0807)  Temp Source: Oral (09/19/20 0807)  Respirations: 17 (09/19/20 0807)  Height: 5' 5" (165 1 cm) (09/17/20 1721)  Weight - Scale: 51 1 kg (112 lb 10 5 oz) (09/17/20 1721)  SpO2: 100 % (09/19/20 0807)  Exam:   Physical Exam  Constitutional:       Appearance: Normal appearance  HENT:      Head: Normocephalic and atraumatic     Eyes: Extraocular Movements: Extraocular movements intact  Pupils: Pupils are equal, round, and reactive to light  Neck:      Musculoskeletal: Normal range of motion and neck supple  Cardiovascular:      Rate and Rhythm: Normal rate and regular rhythm  Heart sounds: No murmur  No gallop  Pulmonary:      Effort: Pulmonary effort is normal       Breath sounds: Normal breath sounds  Abdominal:      General: Bowel sounds are normal       Palpations: Abdomen is soft  Tenderness: There is no abdominal tenderness  Musculoskeletal: Normal range of motion  General: No swelling or deformity  Skin:     General: Skin is warm and dry  Neurological:      General: No focal deficit present  Mental Status: He is alert  Discharge instructions/Information to patient and family:   See after visit summary for information provided to patient and family  Provisions for Follow-Up Care:  See after visit summary for information related to follow-up care and any pertinent home health orders  Disposition:     Home      Planned Readmission: No     Discharge Statement:  I spent 35 minutes discharging the patient  This time was spent on the day of discharge  I had direct contact with the patient on the day of discharge  Greater than 50% of the total time was spent examining patient, answering all patient questions, arranging and discussing plan of care with patient as well as directly providing post-discharge instructions  Additional time then spent on discharge activities  Discharge Medications:  See after visit summary for reconciled discharge medications provided to patient and family        ** Please Note: This note has been constructed using a voice recognition system **

## 2020-09-19 NOTE — ASSESSMENT & PLAN NOTE
Patient presented with worsening abdominal pain for 1 day  Patient noted to have white count of 58096 but improved CRP level  CT scan of the abdomen showed progression of extensive inflammatory changes involving the ascending colon  Patient had a hospitalization about a month and half ago for Crohn's exacerbation and was discharged on prednisone taper  Patient currently on prednisone 20 milligram p o  Daily  Patient followed up with his primary gastroenterologist who recommended MRI of the abdomen  Patient was on Solu-Medrol 20 milligram IV q 8 hours  Patient is on full liquid diet which will be continued  GI was consulted and appreciate recommendations  Patient already on Entyvio as outpatient  Discussed with GI-patient might need prolonged paper with prednisone  GI also recommending to start mesalamine in combination with Entyvio  Trial of Levsin for pain  Patient to be discharged on prednisone 40 milligram p o  Daily till seen by primary gastroenterologist   Suspecting fixed stricture in the terminal ileum as seen on his last small-bowel series in July    GI recommending repeat small-bowel series in 1-2 weeks  Follow-up outpatient with primary GI

## 2020-09-19 NOTE — PLAN OF CARE
Problem: GASTROINTESTINAL - ADULT  Goal: Minimal or absence of nausea and/or vomiting  Description: INTERVENTIONS:  - Administer IV fluids if ordered to ensure adequate hydration  - Maintain NPO status until nausea and vomiting are resolved  - Nasogastric tube if ordered  - Administer ordered antiemetic medications as needed  - Provide nonpharmacologic comfort measures as appropriate  - Advance diet as tolerated, if ordered  - Consider nutrition services referral to assist patient with adequate nutrition and appropriate food choices  9/19/2020 1523 by Sherren Slater, RN  Outcome: Adequate for Discharge  9/19/2020 1520 by Sherren Slater, RN  Outcome: Progressing  Goal: Maintains or returns to baseline bowel function  Description: INTERVENTIONS:  - Assess bowel function  - Encourage oral fluids to ensure adequate hydration  - Administer IV fluids if ordered to ensure adequate hydration  - Administer ordered medications as needed  - Encourage mobilization and activity  - Consider nutritional services referral to assist patient with adequate nutrition and appropriate food choices  9/19/2020 1523 by Sherren Slater, RN  Outcome: Adequate for Discharge  9/19/2020 1520 by Sherren Slater, RN  Outcome: Progressing  Goal: Maintains adequate nutritional intake  Description: INTERVENTIONS:  - Monitor percentage of each meal consumed  - Identify factors contributing to decreased intake, treat as appropriate  - Assist with meals as needed  - Monitor I&O, weight, and lab values if indicated  - Obtain nutrition services referral as needed  9/19/2020 1523 by Sherren Slater, RN  Outcome: Adequate for Discharge  9/19/2020 1520 by Sherren Slater, RN  Outcome: Progressing  Goal: Establish and maintain optimal ostomy function  Description: INTERVENTIONS:  - Assess bowel function  - Encourage oral fluids to ensure adequate hydration  - Administer IV fluids if ordered to ensure adequate hydration   - Administer ordered medications as needed  - Encourage mobilization and activity  - Nutrition services referral to assist patient with appropriate food choices  - Assess stoma site  - Consider wound care consult   9/19/2020 1523 by Denver Rider, RN  Outcome: Adequate for Discharge  9/19/2020 1520 by Denver Rider, RN  Outcome: Progressing

## 2020-09-19 NOTE — DISCHARGE INSTRUCTIONS
Crohn Disease   WHAT YOU NEED TO KNOW:   Crohn disease is an inflammatory disease of the digestive system  Crohn disease causes the lining of your intestines to become inflamed  The lining of your mouth, esophagus, or stomach may also be affected by Crohn disease  DISCHARGE INSTRUCTIONS:   Seek care immediately if:   · You suddenly have trouble breathing  · You vomit blood, or your vomit looks like coffee grounds  · You have a fast heart rate, fast breathing, or are too dizzy to stand  · You have severe pain in your stomach  Contact your healthcare provider if:   · You have tar-colored bowel movements or you see blood in your bowel movements  · You have a fever or chills  · The pain in your abdomen does not go away or gets worse after you take medicine  · Your abdomen is swollen  · You are losing weight without trying  · You have questions or concerns about your condition or care  Medicines:   · Medicines  may be used to decrease inflammation in your digestive tract  You may need antibiotics to treat or prevent an infection and antidiarrheal medicine to decrease diarrhea  Immunosuppressants may also be given to slow your immune system  · Take your medicine as directed  Contact your healthcare provider if you think your medicine is not helping or if you have side effects  Tell him or her if you are allergic to any medicine  Keep a list of the medicines, vitamins, and herbs you take  Include the amounts, and when and why you take them  Bring the list or the pill bottles to follow-up visits  Carry your medicine list with you in case of an emergency  Follow up with your healthcare provider as directed:  Record the number of bowel movements you have each day and describe the color and form (liquid, soft, or hard)  Write down if you saw blood in your bowel movement  Bring the record with you when you see your healthcare provider   Write down your questions so you remember to ask them during your visits  Nutrition:  Keep a record of everything you eat and drink  Include any symptoms the food or drink causes or makes worse  You may need to avoid certain foods  Dairy, alcohol, hot spices, and high-fiber foods are common examples of foods that may worsen your symptoms  Your healthcare provider may recommend that you take vitamins or minerals  Always ask your healthcare provider before you take vitamins or nutritional supplements  Do not smoke:  Nicotine and other chemicals in cigarettes and cigars can cause lung damage and increase your risk for Crohn disease  Ask your healthcare provider for information if you currently smoke and need help to quit  E-cigarettes or smokeless tobacco still contain nicotine  Talk to your healthcare provider before you use these products  © 2017 [facility] Information is for End User's use only and may not be sold, redistributed or otherwise used for commercial purposes  All illustrations and images included in CareNotes® are the copyrighted property of [employer] , [employer]  or [employer] Montane[employer]  The above information is an  only  It is not intended as medical advice for individual conditions or treatments  Talk to your doctor, nurse or pharmacist before following any medical regimen to see if it is safe and effective for you

## 2020-09-21 NOTE — UTILIZATION REVIEW
Notification of Discharge  This is a Notification of Discharge from our facility [address]  Please be advised that this patient has been discharge from our facility  Below you will find the admission and discharge date and time including the patients disposition  PRESENTATION DATE: 9/17/2020 11:52 AM  OBS ADMISSION DATE:   IP ADMISSION DATE: 9/17/20 1509   DISCHARGE DATE: 9/19/2020  6:31 PM  DISPOSITION: Home/Self Care Home/Self Care   Admission Orders listed below:  Admission Orders (From admission, onward)     Ordered        09/17/20 1509  Inpatient Admission  Once                   Please contact the UR Department if additional information is required to close this patient's authorization/case  [facility]  [facility] Utilization Review Department  Main: [phone] x carefully listen to the prompts  All voicemails are confidential   [email or web address]  Send all requests for admission clinical reviews, approved or denied determinations and any other requests to dedicated fax number below belonging to the campus where the patient is receiving treatment   List of dedicated fax numbers:  [address] DENIALS (Administrative/Medical Necessity) [phone]   [address] (Maternity/NICU/Pediatrics) [phone]   Vanessa Moreira [phone]   Nidhi Wilson [phone]   Maddy López [phone]   [facility] [address] [place] [address] [phone]   [facility]  [phone]   [address] [facility] [address]  [address] [facility] [address] [facility] [phone]

## 2020-09-22 LAB
BACTERIA BLD CULT: NORMAL
BACTERIA BLD CULT: NORMAL

## 2020-10-01 ENCOUNTER — APPOINTMENT (EMERGENCY)
Dept: CT IMAGING | Facility: HOSPITAL | Age: 25
DRG: 387 | End: 2020-10-01
Payer: COMMERCIAL

## 2020-10-01 ENCOUNTER — HOSPITAL ENCOUNTER (INPATIENT)
Facility: HOSPITAL | Age: 25
LOS: 3 days | Discharge: HOME | DRG: 387 | End: 2020-10-05
Attending: EMERGENCY MEDICINE | Admitting: SURGERY
Payer: COMMERCIAL

## 2020-10-01 DIAGNOSIS — K50.90 EXACERBATION OF CROHN'S DISEASE WITHOUT COMPLICATION (HCC): ICD-10-CM

## 2020-10-01 DIAGNOSIS — R10.31 RIGHT LOWER QUADRANT PAIN: ICD-10-CM

## 2020-10-01 DIAGNOSIS — K50.10 CROHN'S COLITIS (HCC): Primary | ICD-10-CM

## 2020-10-01 LAB
ALBUMIN SERPL BCP-MCNC: 3.6 G/DL (ref 3.5–5)
ALP SERPL-CCNC: 93 U/L (ref 46–116)
ALT SERPL W P-5'-P-CCNC: 10 U/L (ref 12–78)
ANION GAP SERPL CALCULATED.3IONS-SCNC: 7 MMOL/L (ref 4–13)
AST SERPL W P-5'-P-CCNC: 10 U/L (ref 5–45)
BASOPHILS # BLD AUTO: 0.09 THOUSANDS/ΜL (ref 0–0.1)
BASOPHILS NFR BLD AUTO: 1 % (ref 0–1)
BILIRUB SERPL-MCNC: 0.36 MG/DL (ref 0.2–1)
BUN SERPL-MCNC: 13 MG/DL (ref 5–25)
CALCIUM SERPL-MCNC: 9.1 MG/DL (ref 8.3–10.1)
CHLORIDE SERPL-SCNC: 104 MMOL/L (ref 100–108)
CO2 SERPL-SCNC: 30 MMOL/L (ref 21–32)
CREAT SERPL-MCNC: 1.1 MG/DL (ref 0.6–1.3)
EOSINOPHIL # BLD AUTO: 0.08 THOUSAND/ΜL (ref 0–0.61)
EOSINOPHIL NFR BLD AUTO: 1 % (ref 0–6)
ERYTHROCYTE [DISTWIDTH] IN BLOOD BY AUTOMATED COUNT: 14.9 % (ref 11.6–15.1)
GFR SERPL CREATININE-BSD FRML MDRD: 108 ML/MIN/1.73SQ M
GLUCOSE SERPL-MCNC: 80 MG/DL (ref 65–140)
HCT VFR BLD AUTO: 46.8 % (ref 36.5–49.3)
HGB BLD-MCNC: 15 G/DL (ref 12–17)
HOLD SPECIMEN: NORMAL
IMM GRANULOCYTES # BLD AUTO: 0.12 THOUSAND/UL (ref 0–0.2)
IMM GRANULOCYTES NFR BLD AUTO: 1 % (ref 0–2)
LACTATE SERPL-SCNC: 1.5 MMOL/L (ref 0.5–2)
LIPASE SERPL-CCNC: 199 U/L (ref 73–393)
LYMPHOCYTES # BLD AUTO: 3.09 THOUSANDS/ΜL (ref 0.6–4.47)
LYMPHOCYTES NFR BLD AUTO: 27 % (ref 14–44)
MCH RBC QN AUTO: 29.3 PG (ref 26.8–34.3)
MCHC RBC AUTO-ENTMCNC: 32.1 G/DL (ref 31.4–37.4)
MCV RBC AUTO: 91 FL (ref 82–98)
MONOCYTES # BLD AUTO: 1.1 THOUSAND/ΜL (ref 0.17–1.22)
MONOCYTES NFR BLD AUTO: 10 % (ref 4–12)
NEUTROPHILS # BLD AUTO: 6.83 THOUSANDS/ΜL (ref 1.85–7.62)
NEUTS SEG NFR BLD AUTO: 60 % (ref 43–75)
NRBC BLD AUTO-RTO: 0 /100 WBCS
PLATELET # BLD AUTO: 366 THOUSANDS/UL (ref 149–390)
PMV BLD AUTO: 9.1 FL (ref 8.9–12.7)
POTASSIUM SERPL-SCNC: 3.6 MMOL/L (ref 3.5–5.3)
PROT SERPL-MCNC: 8 G/DL (ref 6.4–8.2)
RBC # BLD AUTO: 5.12 MILLION/UL (ref 3.88–5.62)
SODIUM SERPL-SCNC: 141 MMOL/L (ref 136–145)
WBC # BLD AUTO: 11.31 THOUSAND/UL (ref 4.31–10.16)

## 2020-10-01 PROCEDURE — 74177 CT ABD & PELVIS W/CONTRAST: CPT

## 2020-10-01 PROCEDURE — 36415 COLL VENOUS BLD VENIPUNCTURE: CPT | Performed by: EMERGENCY MEDICINE

## 2020-10-01 PROCEDURE — 96374 THER/PROPH/DIAG INJ IV PUSH: CPT

## 2020-10-01 PROCEDURE — 96361 HYDRATE IV INFUSION ADD-ON: CPT

## 2020-10-01 PROCEDURE — 83690 ASSAY OF LIPASE: CPT | Performed by: EMERGENCY MEDICINE

## 2020-10-01 PROCEDURE — 99285 EMERGENCY DEPT VISIT HI MDM: CPT

## 2020-10-01 PROCEDURE — 85025 COMPLETE CBC W/AUTO DIFF WBC: CPT | Performed by: EMERGENCY MEDICINE

## 2020-10-01 PROCEDURE — 80053 COMPREHEN METABOLIC PANEL: CPT | Performed by: EMERGENCY MEDICINE

## 2020-10-01 PROCEDURE — 99285 EMERGENCY DEPT VISIT HI MDM: CPT | Performed by: EMERGENCY MEDICINE

## 2020-10-01 PROCEDURE — 96375 TX/PRO/DX INJ NEW DRUG ADDON: CPT

## 2020-10-01 PROCEDURE — 83605 ASSAY OF LACTIC ACID: CPT | Performed by: EMERGENCY MEDICINE

## 2020-10-01 PROCEDURE — G1004 CDSM NDSC: HCPCS

## 2020-10-01 RX ORDER — KETOROLAC TROMETHAMINE 30 MG/ML
15 INJECTION, SOLUTION INTRAMUSCULAR; INTRAVENOUS ONCE
Status: COMPLETED | OUTPATIENT
Start: 2020-10-01 | End: 2020-10-01

## 2020-10-01 RX ORDER — SUCRALFATE ORAL 1 G/10ML
1000 SUSPENSION ORAL ONCE
Status: COMPLETED | OUTPATIENT
Start: 2020-10-01 | End: 2020-10-01

## 2020-10-01 RX ORDER — ONDANSETRON 2 MG/ML
4 INJECTION INTRAMUSCULAR; INTRAVENOUS ONCE
Status: COMPLETED | OUTPATIENT
Start: 2020-10-01 | End: 2020-10-01

## 2020-10-01 RX ADMIN — KETOROLAC TROMETHAMINE 15 MG: 30 INJECTION, SOLUTION INTRAMUSCULAR at 22:29

## 2020-10-01 RX ADMIN — IOHEXOL 100 ML: 350 INJECTION, SOLUTION INTRAVENOUS at 23:05

## 2020-10-01 RX ADMIN — ONDANSETRON 4 MG: 2 INJECTION INTRAMUSCULAR; INTRAVENOUS at 22:12

## 2020-10-01 RX ADMIN — SUCRALFATE 1000 MG: 1 SUSPENSION ORAL at 22:38

## 2020-10-01 RX ADMIN — SODIUM CHLORIDE 1000 ML: 0.9 INJECTION, SOLUTION INTRAVENOUS at 22:30

## 2020-10-02 LAB
ALBUMIN SERPL BCP-MCNC: 2.8 G/DL (ref 3.5–5)
ALP SERPL-CCNC: 78 U/L (ref 46–116)
ALT SERPL W P-5'-P-CCNC: 10 U/L (ref 12–78)
ANION GAP SERPL CALCULATED.3IONS-SCNC: 5 MMOL/L (ref 4–13)
AST SERPL W P-5'-P-CCNC: 9 U/L (ref 5–45)
BASOPHILS # BLD AUTO: 0.06 THOUSANDS/ΜL (ref 0–0.1)
BASOPHILS NFR BLD AUTO: 1 % (ref 0–1)
BILIRUB SERPL-MCNC: 0.28 MG/DL (ref 0.2–1)
BUN SERPL-MCNC: 11 MG/DL (ref 5–25)
CALCIUM ALBUM COR SERPL-MCNC: 9.6 MG/DL (ref 8.3–10.1)
CALCIUM SERPL-MCNC: 8.6 MG/DL (ref 8.3–10.1)
CHLORIDE SERPL-SCNC: 107 MMOL/L (ref 100–108)
CO2 SERPL-SCNC: 27 MMOL/L (ref 21–32)
CREAT SERPL-MCNC: 0.76 MG/DL (ref 0.6–1.3)
CRP SERPL QL: 42.3 MG/L
EOSINOPHIL # BLD AUTO: 0.02 THOUSAND/ΜL (ref 0–0.61)
EOSINOPHIL NFR BLD AUTO: 0 % (ref 0–6)
ERYTHROCYTE [DISTWIDTH] IN BLOOD BY AUTOMATED COUNT: 14.9 % (ref 11.6–15.1)
GFR SERPL CREATININE-BSD FRML MDRD: 148 ML/MIN/1.73SQ M
GLUCOSE SERPL-MCNC: 93 MG/DL (ref 65–140)
HCT VFR BLD AUTO: 38.6 % (ref 36.5–49.3)
HGB BLD-MCNC: 12.4 G/DL (ref 12–17)
IMM GRANULOCYTES # BLD AUTO: 0.14 THOUSAND/UL (ref 0–0.2)
IMM GRANULOCYTES NFR BLD AUTO: 2 % (ref 0–2)
LYMPHOCYTES # BLD AUTO: 1.01 THOUSANDS/ΜL (ref 0.6–4.47)
LYMPHOCYTES NFR BLD AUTO: 14 % (ref 14–44)
MAGNESIUM SERPL-MCNC: 2 MG/DL (ref 1.6–2.6)
MCH RBC QN AUTO: 29.5 PG (ref 26.8–34.3)
MCHC RBC AUTO-ENTMCNC: 32.1 G/DL (ref 31.4–37.4)
MCV RBC AUTO: 92 FL (ref 82–98)
MONOCYTES # BLD AUTO: 0.18 THOUSAND/ΜL (ref 0.17–1.22)
MONOCYTES NFR BLD AUTO: 3 % (ref 4–12)
NEUTROPHILS # BLD AUTO: 5.74 THOUSANDS/ΜL (ref 1.85–7.62)
NEUTS SEG NFR BLD AUTO: 80 % (ref 43–75)
NRBC BLD AUTO-RTO: 0 /100 WBCS
PHOSPHATE SERPL-MCNC: 2.6 MG/DL (ref 2.7–4.5)
PLATELET # BLD AUTO: 307 THOUSANDS/UL (ref 149–390)
PMV BLD AUTO: 9.3 FL (ref 8.9–12.7)
POTASSIUM SERPL-SCNC: 4 MMOL/L (ref 3.5–5.3)
PROT SERPL-MCNC: 6.6 G/DL (ref 6.4–8.2)
RBC # BLD AUTO: 4.21 MILLION/UL (ref 3.88–5.62)
SODIUM SERPL-SCNC: 139 MMOL/L (ref 136–145)
WBC # BLD AUTO: 7.15 THOUSAND/UL (ref 4.31–10.16)

## 2020-10-02 PROCEDURE — 96361 HYDRATE IV INFUSION ADD-ON: CPT

## 2020-10-02 PROCEDURE — 85025 COMPLETE CBC W/AUTO DIFF WBC: CPT | Performed by: SURGERY

## 2020-10-02 PROCEDURE — 84100 ASSAY OF PHOSPHORUS: CPT | Performed by: SURGERY

## 2020-10-02 PROCEDURE — 99223 1ST HOSP IP/OBS HIGH 75: CPT | Performed by: INTERNAL MEDICINE

## 2020-10-02 PROCEDURE — C9113 INJ PANTOPRAZOLE SODIUM, VIA: HCPCS | Performed by: SURGERY

## 2020-10-02 PROCEDURE — 86140 C-REACTIVE PROTEIN: CPT | Performed by: SURGERY

## 2020-10-02 PROCEDURE — 80053 COMPREHEN METABOLIC PANEL: CPT | Performed by: SURGERY

## 2020-10-02 PROCEDURE — 99222 1ST HOSP IP/OBS MODERATE 55: CPT | Performed by: SURGERY

## 2020-10-02 PROCEDURE — 83735 ASSAY OF MAGNESIUM: CPT | Performed by: SURGERY

## 2020-10-02 RX ORDER — OXYCODONE HYDROCHLORIDE 5 MG/1
5 TABLET ORAL EVERY 4 HOURS PRN
Status: DISCONTINUED | OUTPATIENT
Start: 2020-10-02 | End: 2020-10-05 | Stop reason: HOSPADM

## 2020-10-02 RX ORDER — HYDROMORPHONE HCL/PF 1 MG/ML
0.5 SYRINGE (ML) INJECTION EVERY 4 HOURS PRN
Status: DISCONTINUED | OUTPATIENT
Start: 2020-10-02 | End: 2020-10-05 | Stop reason: HOSPADM

## 2020-10-02 RX ORDER — METHYLPREDNISOLONE SODIUM SUCCINATE 40 MG/ML
20 INJECTION, POWDER, LYOPHILIZED, FOR SOLUTION INTRAMUSCULAR; INTRAVENOUS EVERY 8 HOURS SCHEDULED
Status: DISCONTINUED | OUTPATIENT
Start: 2020-10-02 | End: 2020-10-04

## 2020-10-02 RX ORDER — PANTOPRAZOLE SODIUM 40 MG/1
40 INJECTION, POWDER, FOR SOLUTION INTRAVENOUS
Status: DISCONTINUED | OUTPATIENT
Start: 2020-10-02 | End: 2020-10-05 | Stop reason: HOSPADM

## 2020-10-02 RX ORDER — CIPROFLOXACIN 2 MG/ML
400 INJECTION, SOLUTION INTRAVENOUS EVERY 12 HOURS
Status: DISCONTINUED | OUTPATIENT
Start: 2020-10-02 | End: 2020-10-05 | Stop reason: HOSPADM

## 2020-10-02 RX ORDER — SODIUM CHLORIDE, SODIUM LACTATE, POTASSIUM CHLORIDE, CALCIUM CHLORIDE 600; 310; 30; 20 MG/100ML; MG/100ML; MG/100ML; MG/100ML
125 INJECTION, SOLUTION INTRAVENOUS CONTINUOUS
Status: DISCONTINUED | OUTPATIENT
Start: 2020-10-02 | End: 2020-10-03

## 2020-10-02 RX ORDER — PREDNISONE 20 MG/1
40 TABLET ORAL DAILY
Status: DISCONTINUED | OUTPATIENT
Start: 2020-10-02 | End: 2020-10-02

## 2020-10-02 RX ORDER — ONDANSETRON 2 MG/ML
4 INJECTION INTRAMUSCULAR; INTRAVENOUS EVERY 6 HOURS PRN
Status: DISCONTINUED | OUTPATIENT
Start: 2020-10-02 | End: 2020-10-05 | Stop reason: HOSPADM

## 2020-10-02 RX ORDER — OXYCODONE HYDROCHLORIDE 10 MG/1
10 TABLET ORAL EVERY 4 HOURS PRN
Status: DISCONTINUED | OUTPATIENT
Start: 2020-10-02 | End: 2020-10-05 | Stop reason: HOSPADM

## 2020-10-02 RX ADMIN — SODIUM CHLORIDE, SODIUM LACTATE, POTASSIUM CHLORIDE, AND CALCIUM CHLORIDE 125 ML/HR: .6; .31; .03; .02 INJECTION, SOLUTION INTRAVENOUS at 02:23

## 2020-10-02 RX ADMIN — Medication 125 MG: at 03:41

## 2020-10-02 RX ADMIN — METRONIDAZOLE 500 MG: 500 INJECTION, SOLUTION INTRAVENOUS at 02:24

## 2020-10-02 RX ADMIN — METHYLPREDNISOLONE SODIUM SUCCINATE 20 MG: 40 INJECTION, POWDER, FOR SOLUTION INTRAMUSCULAR; INTRAVENOUS at 10:24

## 2020-10-02 RX ADMIN — METHYLPREDNISOLONE SODIUM SUCCINATE 20 MG: 40 INJECTION, POWDER, FOR SOLUTION INTRAMUSCULAR; INTRAVENOUS at 02:23

## 2020-10-02 RX ADMIN — CIPROFLOXACIN 400 MG: 2 INJECTION, SOLUTION INTRAVENOUS at 03:42

## 2020-10-02 RX ADMIN — Medication 125 MG: at 21:12

## 2020-10-02 RX ADMIN — PANTOPRAZOLE SODIUM 40 MG: 40 INJECTION, POWDER, FOR SOLUTION INTRAVENOUS at 02:23

## 2020-10-02 RX ADMIN — METRONIDAZOLE 500 MG: 500 INJECTION, SOLUTION INTRAVENOUS at 10:23

## 2020-10-02 RX ADMIN — Medication 125 MG: at 17:38

## 2020-10-02 RX ADMIN — METRONIDAZOLE 500 MG: 500 INJECTION, SOLUTION INTRAVENOUS at 17:40

## 2020-10-02 RX ADMIN — CIPROFLOXACIN 400 MG: 2 INJECTION, SOLUTION INTRAVENOUS at 13:35

## 2020-10-02 RX ADMIN — PANTOPRAZOLE SODIUM 40 MG: 40 INJECTION, POWDER, FOR SOLUTION INTRAVENOUS at 08:53

## 2020-10-02 RX ADMIN — SODIUM CHLORIDE, SODIUM LACTATE, POTASSIUM CHLORIDE, AND CALCIUM CHLORIDE 125 ML/HR: .6; .31; .03; .02 INJECTION, SOLUTION INTRAVENOUS at 21:55

## 2020-10-02 RX ADMIN — METHYLPREDNISOLONE SODIUM SUCCINATE 20 MG: 40 INJECTION, POWDER, FOR SOLUTION INTRAMUSCULAR; INTRAVENOUS at 17:39

## 2020-10-02 RX ADMIN — Medication 125 MG: at 10:57

## 2020-10-03 LAB
ANION GAP SERPL CALCULATED.3IONS-SCNC: 8 MMOL/L (ref 4–13)
BASOPHILS # BLD AUTO: 0.04 THOUSANDS/ΜL (ref 0–0.1)
BASOPHILS NFR BLD AUTO: 0 % (ref 0–1)
BUN SERPL-MCNC: 6 MG/DL (ref 5–25)
CALCIUM SERPL-MCNC: 9 MG/DL (ref 8.3–10.1)
CHLORIDE SERPL-SCNC: 107 MMOL/L (ref 100–108)
CO2 SERPL-SCNC: 27 MMOL/L (ref 21–32)
CREAT SERPL-MCNC: 0.86 MG/DL (ref 0.6–1.3)
EOSINOPHIL # BLD AUTO: 0 THOUSAND/ΜL (ref 0–0.61)
EOSINOPHIL NFR BLD AUTO: 0 % (ref 0–6)
ERYTHROCYTE [DISTWIDTH] IN BLOOD BY AUTOMATED COUNT: 14.3 % (ref 11.6–15.1)
GFR SERPL CREATININE-BSD FRML MDRD: 140 ML/MIN/1.73SQ M
GLUCOSE SERPL-MCNC: 115 MG/DL (ref 65–140)
HCT VFR BLD AUTO: 35.9 % (ref 36.5–49.3)
HGB BLD-MCNC: 11.9 G/DL (ref 12–17)
IMM GRANULOCYTES # BLD AUTO: 0.21 THOUSAND/UL (ref 0–0.2)
IMM GRANULOCYTES NFR BLD AUTO: 1 % (ref 0–2)
LYMPHOCYTES # BLD AUTO: 1.34 THOUSANDS/ΜL (ref 0.6–4.47)
LYMPHOCYTES NFR BLD AUTO: 9 % (ref 14–44)
MCH RBC QN AUTO: 29.9 PG (ref 26.8–34.3)
MCHC RBC AUTO-ENTMCNC: 33.1 G/DL (ref 31.4–37.4)
MCV RBC AUTO: 90 FL (ref 82–98)
MONOCYTES # BLD AUTO: 0.63 THOUSAND/ΜL (ref 0.17–1.22)
MONOCYTES NFR BLD AUTO: 4 % (ref 4–12)
NEUTROPHILS # BLD AUTO: 12.45 THOUSANDS/ΜL (ref 1.85–7.62)
NEUTS SEG NFR BLD AUTO: 86 % (ref 43–75)
NRBC BLD AUTO-RTO: 0 /100 WBCS
PLATELET # BLD AUTO: 328 THOUSANDS/UL (ref 149–390)
PMV BLD AUTO: 9.3 FL (ref 8.9–12.7)
POTASSIUM SERPL-SCNC: 3.9 MMOL/L (ref 3.5–5.3)
RBC # BLD AUTO: 3.98 MILLION/UL (ref 3.88–5.62)
SODIUM SERPL-SCNC: 142 MMOL/L (ref 136–145)
WBC # BLD AUTO: 14.67 THOUSAND/UL (ref 4.31–10.16)

## 2020-10-03 PROCEDURE — NC001 PR NO CHARGE: Performed by: SURGERY

## 2020-10-03 PROCEDURE — 85025 COMPLETE CBC W/AUTO DIFF WBC: CPT | Performed by: SURGERY

## 2020-10-03 PROCEDURE — 83993 ASSAY FOR CALPROTECTIN FECAL: CPT | Performed by: PHYSICIAN ASSISTANT

## 2020-10-03 PROCEDURE — 80048 BASIC METABOLIC PNL TOTAL CA: CPT | Performed by: SURGERY

## 2020-10-03 PROCEDURE — 99232 SBSQ HOSP IP/OBS MODERATE 35: CPT | Performed by: INTERNAL MEDICINE

## 2020-10-03 PROCEDURE — C9113 INJ PANTOPRAZOLE SODIUM, VIA: HCPCS | Performed by: SURGERY

## 2020-10-03 RX ADMIN — METHYLPREDNISOLONE SODIUM SUCCINATE 20 MG: 40 INJECTION, POWDER, FOR SOLUTION INTRAMUSCULAR; INTRAVENOUS at 11:50

## 2020-10-03 RX ADMIN — METRONIDAZOLE 500 MG: 500 INJECTION, SOLUTION INTRAVENOUS at 21:04

## 2020-10-03 RX ADMIN — METRONIDAZOLE 500 MG: 500 INJECTION, SOLUTION INTRAVENOUS at 11:50

## 2020-10-03 RX ADMIN — Medication 125 MG: at 15:23

## 2020-10-03 RX ADMIN — PANTOPRAZOLE SODIUM 40 MG: 40 INJECTION, POWDER, FOR SOLUTION INTRAVENOUS at 08:15

## 2020-10-03 RX ADMIN — SODIUM CHLORIDE, SODIUM LACTATE, POTASSIUM CHLORIDE, AND CALCIUM CHLORIDE 125 ML/HR: .6; .31; .03; .02 INJECTION, SOLUTION INTRAVENOUS at 06:51

## 2020-10-03 RX ADMIN — Medication 125 MG: at 10:23

## 2020-10-03 RX ADMIN — Medication 125 MG: at 22:20

## 2020-10-03 RX ADMIN — METRONIDAZOLE 500 MG: 500 INJECTION, SOLUTION INTRAVENOUS at 04:27

## 2020-10-03 RX ADMIN — METHYLPREDNISOLONE SODIUM SUCCINATE 20 MG: 40 INJECTION, POWDER, FOR SOLUTION INTRAMUSCULAR; INTRAVENOUS at 21:02

## 2020-10-03 RX ADMIN — CIPROFLOXACIN 400 MG: 2 INJECTION, SOLUTION INTRAVENOUS at 02:21

## 2020-10-03 RX ADMIN — CIPROFLOXACIN 400 MG: 2 INJECTION, SOLUTION INTRAVENOUS at 13:18

## 2020-10-03 RX ADMIN — Medication 125 MG: at 04:39

## 2020-10-03 RX ADMIN — METHYLPREDNISOLONE SODIUM SUCCINATE 20 MG: 40 INJECTION, POWDER, FOR SOLUTION INTRAMUSCULAR; INTRAVENOUS at 04:37

## 2020-10-04 LAB
ANION GAP SERPL CALCULATED.3IONS-SCNC: 7 MMOL/L (ref 4–13)
BASOPHILS # BLD AUTO: 0.04 THOUSANDS/ΜL (ref 0–0.1)
BASOPHILS NFR BLD AUTO: 0 % (ref 0–1)
BUN SERPL-MCNC: 11 MG/DL (ref 5–25)
CALCIUM SERPL-MCNC: 9.2 MG/DL (ref 8.3–10.1)
CHLORIDE SERPL-SCNC: 105 MMOL/L (ref 100–108)
CO2 SERPL-SCNC: 28 MMOL/L (ref 21–32)
CREAT SERPL-MCNC: 0.98 MG/DL (ref 0.6–1.3)
EOSINOPHIL # BLD AUTO: 0 THOUSAND/ΜL (ref 0–0.61)
EOSINOPHIL NFR BLD AUTO: 0 % (ref 0–6)
ERYTHROCYTE [DISTWIDTH] IN BLOOD BY AUTOMATED COUNT: 14.9 % (ref 11.6–15.1)
GFR SERPL CREATININE-BSD FRML MDRD: 124 ML/MIN/1.73SQ M
GLUCOSE SERPL-MCNC: 117 MG/DL (ref 65–140)
HCT VFR BLD AUTO: 40.7 % (ref 36.5–49.3)
HGB BLD-MCNC: 13.5 G/DL (ref 12–17)
IMM GRANULOCYTES # BLD AUTO: 0.16 THOUSAND/UL (ref 0–0.2)
IMM GRANULOCYTES NFR BLD AUTO: 1 % (ref 0–2)
LYMPHOCYTES # BLD AUTO: 1.25 THOUSANDS/ΜL (ref 0.6–4.47)
LYMPHOCYTES NFR BLD AUTO: 7 % (ref 14–44)
MCH RBC QN AUTO: 30.2 PG (ref 26.8–34.3)
MCHC RBC AUTO-ENTMCNC: 33.2 G/DL (ref 31.4–37.4)
MCV RBC AUTO: 91 FL (ref 82–98)
MONOCYTES # BLD AUTO: 0.8 THOUSAND/ΜL (ref 0.17–1.22)
MONOCYTES NFR BLD AUTO: 5 % (ref 4–12)
NEUTROPHILS # BLD AUTO: 14.53 THOUSANDS/ΜL (ref 1.85–7.62)
NEUTS SEG NFR BLD AUTO: 87 % (ref 43–75)
NRBC BLD AUTO-RTO: 0 /100 WBCS
PLATELET # BLD AUTO: 372 THOUSANDS/UL (ref 149–390)
PMV BLD AUTO: 9.4 FL (ref 8.9–12.7)
POTASSIUM SERPL-SCNC: 4.1 MMOL/L (ref 3.5–5.3)
RBC # BLD AUTO: 4.47 MILLION/UL (ref 3.88–5.62)
SODIUM SERPL-SCNC: 140 MMOL/L (ref 136–145)
WBC # BLD AUTO: 16.78 THOUSAND/UL (ref 4.31–10.16)

## 2020-10-04 PROCEDURE — 99232 SBSQ HOSP IP/OBS MODERATE 35: CPT | Performed by: INTERNAL MEDICINE

## 2020-10-04 PROCEDURE — 85025 COMPLETE CBC W/AUTO DIFF WBC: CPT | Performed by: SURGERY

## 2020-10-04 PROCEDURE — 99232 SBSQ HOSP IP/OBS MODERATE 35: CPT | Performed by: SURGERY

## 2020-10-04 PROCEDURE — 80048 BASIC METABOLIC PNL TOTAL CA: CPT | Performed by: SURGERY

## 2020-10-04 PROCEDURE — C9113 INJ PANTOPRAZOLE SODIUM, VIA: HCPCS | Performed by: SURGERY

## 2020-10-04 RX ORDER — PREDNISONE 20 MG/1
40 TABLET ORAL DAILY
Status: DISCONTINUED | OUTPATIENT
Start: 2020-10-04 | End: 2020-10-05 | Stop reason: HOSPADM

## 2020-10-04 RX ORDER — SODIUM CHLORIDE, SODIUM LACTATE, POTASSIUM CHLORIDE, CALCIUM CHLORIDE 600; 310; 30; 20 MG/100ML; MG/100ML; MG/100ML; MG/100ML
100 INJECTION, SOLUTION INTRAVENOUS CONTINUOUS
Status: DISCONTINUED | OUTPATIENT
Start: 2020-10-05 | End: 2020-10-05 | Stop reason: HOSPADM

## 2020-10-04 RX ADMIN — Medication 125 MG: at 09:02

## 2020-10-04 RX ADMIN — PREDNISONE 40 MG: 20 TABLET ORAL at 12:56

## 2020-10-04 RX ADMIN — METHYLPREDNISOLONE SODIUM SUCCINATE 20 MG: 40 INJECTION, POWDER, FOR SOLUTION INTRAMUSCULAR; INTRAVENOUS at 05:13

## 2020-10-04 RX ADMIN — METRONIDAZOLE 500 MG: 500 INJECTION, SOLUTION INTRAVENOUS at 20:08

## 2020-10-04 RX ADMIN — METRONIDAZOLE 500 MG: 500 INJECTION, SOLUTION INTRAVENOUS at 12:56

## 2020-10-04 RX ADMIN — Medication 125 MG: at 02:40

## 2020-10-04 RX ADMIN — Medication 125 MG: at 21:47

## 2020-10-04 RX ADMIN — PANTOPRAZOLE SODIUM 40 MG: 40 INJECTION, POWDER, FOR SOLUTION INTRAVENOUS at 09:01

## 2020-10-04 RX ADMIN — METRONIDAZOLE 500 MG: 500 INJECTION, SOLUTION INTRAVENOUS at 05:13

## 2020-10-04 RX ADMIN — CIPROFLOXACIN 400 MG: 2 INJECTION, SOLUTION INTRAVENOUS at 02:40

## 2020-10-04 RX ADMIN — Medication 125 MG: at 15:32

## 2020-10-04 RX ADMIN — CIPROFLOXACIN 400 MG: 2 INJECTION, SOLUTION INTRAVENOUS at 14:10

## 2020-10-05 ENCOUNTER — APPOINTMENT (INPATIENT)
Dept: MRI IMAGING | Facility: HOSPITAL | Age: 25
DRG: 387 | End: 2020-10-05
Payer: COMMERCIAL

## 2020-10-05 VITALS
TEMPERATURE: 98.2 F | HEART RATE: 91 BPM | HEIGHT: 65 IN | BODY MASS INDEX: 19.25 KG/M2 | OXYGEN SATURATION: 100 % | SYSTOLIC BLOOD PRESSURE: 119 MMHG | WEIGHT: 115.52 LBS | DIASTOLIC BLOOD PRESSURE: 79 MMHG | RESPIRATION RATE: 16 BRPM

## 2020-10-05 LAB
ANION GAP SERPL CALCULATED.3IONS-SCNC: 7 MMOL/L (ref 4–13)
BASOPHILS # BLD AUTO: 0.04 THOUSANDS/ΜL (ref 0–0.1)
BASOPHILS NFR BLD AUTO: 0 % (ref 0–1)
BUN SERPL-MCNC: 14 MG/DL (ref 5–25)
CALCIUM SERPL-MCNC: 8.9 MG/DL (ref 8.3–10.1)
CHLORIDE SERPL-SCNC: 104 MMOL/L (ref 100–108)
CO2 SERPL-SCNC: 27 MMOL/L (ref 21–32)
CREAT SERPL-MCNC: 0.91 MG/DL (ref 0.6–1.3)
EOSINOPHIL # BLD AUTO: 0 THOUSAND/ΜL (ref 0–0.61)
EOSINOPHIL NFR BLD AUTO: 0 % (ref 0–6)
ERYTHROCYTE [DISTWIDTH] IN BLOOD BY AUTOMATED COUNT: 14.8 % (ref 11.6–15.1)
ERYTHROCYTE [SEDIMENTATION RATE] IN BLOOD: 7 MM/HOUR (ref 0–14)
GFR SERPL CREATININE-BSD FRML MDRD: 136 ML/MIN/1.73SQ M
GLUCOSE SERPL-MCNC: 92 MG/DL (ref 65–140)
HCT VFR BLD AUTO: 42 % (ref 36.5–49.3)
HGB BLD-MCNC: 13.7 G/DL (ref 12–17)
IMM GRANULOCYTES # BLD AUTO: 0.22 THOUSAND/UL (ref 0–0.2)
IMM GRANULOCYTES NFR BLD AUTO: 2 % (ref 0–2)
LYMPHOCYTES # BLD AUTO: 2.22 THOUSANDS/ΜL (ref 0.6–4.47)
LYMPHOCYTES NFR BLD AUTO: 15 % (ref 14–44)
MCH RBC QN AUTO: 29.7 PG (ref 26.8–34.3)
MCHC RBC AUTO-ENTMCNC: 32.6 G/DL (ref 31.4–37.4)
MCV RBC AUTO: 91 FL (ref 82–98)
MONOCYTES # BLD AUTO: 0.99 THOUSAND/ΜL (ref 0.17–1.22)
MONOCYTES NFR BLD AUTO: 7 % (ref 4–12)
NEUTROPHILS # BLD AUTO: 11.28 THOUSANDS/ΜL (ref 1.85–7.62)
NEUTS SEG NFR BLD AUTO: 76 % (ref 43–75)
NRBC BLD AUTO-RTO: 0 /100 WBCS
PLATELET # BLD AUTO: 337 THOUSANDS/UL (ref 149–390)
PMV BLD AUTO: 9.2 FL (ref 8.9–12.7)
POTASSIUM SERPL-SCNC: 3.8 MMOL/L (ref 3.5–5.3)
RBC # BLD AUTO: 4.61 MILLION/UL (ref 3.88–5.62)
SODIUM SERPL-SCNC: 138 MMOL/L (ref 136–145)
WBC # BLD AUTO: 14.75 THOUSAND/UL (ref 4.31–10.16)

## 2020-10-05 PROCEDURE — NC001 PR NO CHARGE: Performed by: SURGERY

## 2020-10-05 PROCEDURE — 74183 MRI ABD W/O CNTR FLWD CNTR: CPT

## 2020-10-05 PROCEDURE — 72197 MRI PELVIS W/O & W/DYE: CPT

## 2020-10-05 PROCEDURE — 99232 SBSQ HOSP IP/OBS MODERATE 35: CPT | Performed by: INTERNAL MEDICINE

## 2020-10-05 PROCEDURE — C9113 INJ PANTOPRAZOLE SODIUM, VIA: HCPCS | Performed by: SURGERY

## 2020-10-05 PROCEDURE — 85652 RBC SED RATE AUTOMATED: CPT | Performed by: PHYSICIAN ASSISTANT

## 2020-10-05 PROCEDURE — G1004 CDSM NDSC: HCPCS

## 2020-10-05 PROCEDURE — 99238 HOSP IP/OBS DSCHRG MGMT 30/<: CPT | Performed by: SURGERY

## 2020-10-05 PROCEDURE — A9585 GADOBUTROL INJECTION: HCPCS | Performed by: SURGERY

## 2020-10-05 PROCEDURE — 80048 BASIC METABOLIC PNL TOTAL CA: CPT | Performed by: SURGERY

## 2020-10-05 PROCEDURE — 85025 COMPLETE CBC W/AUTO DIFF WBC: CPT | Performed by: SURGERY

## 2020-10-05 RX ORDER — METRONIDAZOLE 500 MG/1
500 TABLET ORAL EVERY 8 HOURS SCHEDULED
Qty: 21 TABLET | Refills: 0 | Status: SHIPPED | OUTPATIENT
Start: 2020-10-05 | End: 2020-10-05

## 2020-10-05 RX ORDER — CIPROFLOXACIN 500 MG/1
500 TABLET, FILM COATED ORAL EVERY 12 HOURS SCHEDULED
Qty: 14 TABLET | Refills: 0 | Status: SHIPPED | OUTPATIENT
Start: 2020-10-05 | End: 2020-10-12

## 2020-10-05 RX ORDER — METRONIDAZOLE 500 MG/1
500 TABLET ORAL EVERY 8 HOURS SCHEDULED
Status: DISCONTINUED | OUTPATIENT
Start: 2020-10-05 | End: 2020-10-05 | Stop reason: HOSPADM

## 2020-10-05 RX ORDER — METRONIDAZOLE 500 MG/1
500 TABLET ORAL EVERY 8 HOURS SCHEDULED
Qty: 21 TABLET | Refills: 0 | Status: SHIPPED | OUTPATIENT
Start: 2020-10-05 | End: 2020-10-12

## 2020-10-05 RX ORDER — PREDNISONE 1 MG/1
TABLET ORAL
Qty: 252 TABLET | Refills: 0 | Status: SHIPPED | OUTPATIENT
Start: 2020-10-05

## 2020-10-05 RX ADMIN — GADOBUTROL 5 ML: 604.72 INJECTION INTRAVENOUS at 09:05

## 2020-10-05 RX ADMIN — PANTOPRAZOLE SODIUM 40 MG: 40 INJECTION, POWDER, FOR SOLUTION INTRAVENOUS at 09:54

## 2020-10-05 RX ADMIN — Medication 125 MG: at 04:14

## 2020-10-05 RX ADMIN — SODIUM CHLORIDE, SODIUM LACTATE, POTASSIUM CHLORIDE, AND CALCIUM CHLORIDE 100 ML/HR: .6; .31; .03; .02 INJECTION, SOLUTION INTRAVENOUS at 00:15

## 2020-10-05 RX ADMIN — METRONIDAZOLE 500 MG: 500 INJECTION, SOLUTION INTRAVENOUS at 12:18

## 2020-10-05 RX ADMIN — CIPROFLOXACIN 400 MG: 2 INJECTION, SOLUTION INTRAVENOUS at 02:27

## 2020-10-05 RX ADMIN — CIPROFLOXACIN 400 MG: 2 INJECTION, SOLUTION INTRAVENOUS at 13:59

## 2020-10-05 RX ADMIN — METRONIDAZOLE 500 MG: 500 INJECTION, SOLUTION INTRAVENOUS at 04:14

## 2020-10-05 RX ADMIN — GLUCAGON HYDROCHLORIDE 1 MG: KIT at 08:54

## 2020-10-05 RX ADMIN — Medication 125 MG: at 09:54

## 2020-10-05 RX ADMIN — PREDNISONE 40 MG: 20 TABLET ORAL at 09:54

## 2020-10-09 ENCOUNTER — HOSPITAL ENCOUNTER (OUTPATIENT)
Dept: MRI IMAGING | Facility: HOSPITAL | Age: 25
Discharge: HOME | End: 2020-10-09
Attending: INTERNAL MEDICINE

## 2020-10-12 LAB — CALPROTECTIN STL-MCNT: 836 UG/G (ref 0–120)

## 2020-10-20 ENCOUNTER — HOSPITAL ENCOUNTER (OUTPATIENT)
Dept: INFUSION CENTER | Facility: HOSPITAL | Age: 25
Discharge: HOME | End: 2020-10-20

## 2021-01-04 ENCOUNTER — TELEPHONE (OUTPATIENT)
Dept: GASTROENTEROLOGY | Facility: CLINIC | Age: 26
End: 2021-01-04

## 2021-01-04 ENCOUNTER — DOCUMENTATION (OUTPATIENT)
Dept: GASTROENTEROLOGY | Facility: CLINIC | Age: 26
End: 2021-01-04

## 2021-01-04 NOTE — TELEPHONE ENCOUNTER
Patient's vedolizumab level is a bit low  Please inquire about coverage for Q6 week dosing   Thank you

## 2021-01-04 NOTE — TELEPHONE ENCOUNTER
Pt informed of Dr Jesusita Calderon rec to change his Entyvio to every 6 weeks  Please arrange this

## 2021-01-04 NOTE — TELEPHONE ENCOUNTER
Faxed new order over to [employer] for them to acquire a new auth if need be and they will contact pt with next infusion appt

## 2021-02-02 PROBLEM — K21.00 GASTROESOPHAGEAL REFLUX DISEASE WITH ESOPHAGITIS WITHOUT HEMORRHAGE: Status: ACTIVE | Noted: 2021-02-02

## 2021-02-03 ENCOUNTER — OFFICE VISIT (OUTPATIENT)
Dept: GASTROENTEROLOGY | Facility: CLINIC | Age: 26
End: 2021-02-03
Payer: COMMERCIAL

## 2021-02-03 VITALS
WEIGHT: 131 LBS | HEART RATE: 78 BPM | DIASTOLIC BLOOD PRESSURE: 71 MMHG | BODY MASS INDEX: 21.83 KG/M2 | HEIGHT: 65 IN | SYSTOLIC BLOOD PRESSURE: 124 MMHG

## 2021-02-03 DIAGNOSIS — K21.00 GASTROESOPHAGEAL REFLUX DISEASE WITH ESOPHAGITIS WITHOUT HEMORRHAGE: Primary | ICD-10-CM

## 2021-02-03 DIAGNOSIS — K50.818 CROHN'S DISEASE OF BOTH SMALL AND LARGE INTESTINE WITH OTHER COMPLICATION (HCC): ICD-10-CM

## 2021-02-03 DIAGNOSIS — E43 SEVERE PROTEIN-CALORIE MALNUTRITION (HCC): ICD-10-CM

## 2021-02-03 PROBLEM — K50.80 CROHN'S DISEASE OF BOTH SMALL AND LARGE INTESTINE (HCC): Status: ACTIVE | Noted: 2021-02-03

## 2021-02-03 PROCEDURE — 99214 OFFICE O/P EST MOD 30 MIN: CPT | Performed by: INTERNAL MEDICINE

## 2021-02-03 NOTE — PROGRESS NOTES
[facility] - Outpatient Follow-up Note  Heath Prader 26 y o  male MRN: [id number]  Encounter: [id number]          ASSESSMENT AND PLAN:      1  Gastroesophageal reflux disease with esophagitis without hemorrhage   doing well  Off of his omeprazole  He is avoiding exacerbating food stuffs  2  Crohn's disease of both small and large intestine with other complication ([facility] )    He is now off steroids and is getting Entyvio every 8 weeks  A recent level was 11 below therapeutic although he feels well moving bowels once every 2 weeks will consider  Decreasing the interval from 8-6 weeks  Recent CBC and CMP unremarkable  He does have stricturing of the small bowel  CMP CBC with diff in B12 will be ordered  3  Severe protein-calorie malnutrition ([facility] [address] )    He is eating better low residue diet his weight is up to 131  He will otherwise follow-up in 2 months     ______________________________________________________________________    SUBJECTIVE:    Very pleasant 16-year-old gentleman since with complicated Crohn's disease had periods of time there is question of fistulous  He does have strictures in the distal small bowel on his last colonoscopy had inflammation region of the cecum as well as the small bowel  He is doing well on current medications he is off steroids he is eating better feels well no abdominal pain  REVIEW OF SYSTEMS IS OTHERWISE NEGATIVE  Historical Information   Past Medical History:   Diagnosis Date    Asthma     Crohn disease ([facility] )      History reviewed  No pertinent surgical history    Social History   Social History     Substance and Sexual Activity   Alcohol Use Never    Frequency: Never    Drinks per session: Patient refused    Binge frequency: Never     Social History     Substance and Sexual Activity   Drug Use Never     Social History     Tobacco Use   Smoking Status Never Smoker   Smokeless Tobacco Never Used     Family History   Problem Relation Age of Onset    Asthma Mother        Meds/Allergies       Current Outpatient Medications:     vedolizumab (Entyvio) 300 MG SOLR    omeprazole (PriLOSEC) 40 MG capsule    predniSONE 5 mg tablet    No Known Allergies        Objective     Blood pressure 124/71, pulse 78, height 5' 5" (1 651 m), weight 59 4 kg (131 lb)  Body mass index is 21 8 kg/m²  PHYSICAL EXAM:      General Appearance:   Alert, cooperative, no distress   HEENT:   Normocephalic, atraumatic, anicteric      Neck:  Supple, symmetrical, trachea midline   Lungs:   Clear to auscultation bilaterally; no rales, rhonchi or wheezing; respirations unlabored    Heart[de-identified]   Regular rate and rhythm; no murmur, rub, or gallop  Abdomen:   Soft, non-tender, non-distended; normal bowel sounds; no masses, no organomegaly    Genitalia:   Deferred    Rectal:   Deferred    Extremities:  No cyanosis, clubbing or edema    Pulses:  2+ and symmetric    Skin:  No jaundice, rashes, or lesions    Lymph nodes:  No palpable cervical lymphadenopathy        Lab Results:   No visits with results within 1 Day(s) from this visit     Latest known visit with results is:   Admission on 10/01/2020, Discharged on 10/05/2020   Component Date Value    WBC 10/01/2020 11 31*    RBC 10/01/2020 5 12     Hemoglobin 10/01/2020 15 0     Hematocrit 10/01/2020 46 8     MCV 10/01/2020 91     MCH 10/01/2020 29 3     MCHC 10/01/2020 32 1     RDW 10/01/2020 14 9     MPV 10/01/2020 9 1     Platelets 71/70/1156 366     nRBC 10/01/2020 0     Neutrophils Relative 10/01/2020 60     Immat GRANS % 10/01/2020 1     Lymphocytes Relative 10/01/2020 27     Monocytes Relative 10/01/2020 10     Eosinophils Relative 10/01/2020 1     Basophils Relative 10/01/2020 1     Neutrophils Absolute 10/01/2020 6 83     Immature Grans Absolute 10/01/2020 0 12     Lymphocytes Absolute 10/01/2020 3 09     Monocytes Absolute 10/01/2020 1 10     Eosinophils Absolute 10/01/2020 0 08     Basophils Absolute 10/01/2020 0 09     Sodium 10/01/2020 141     Potassium 10/01/2020 3 6     Chloride 10/01/2020 104     CO2 10/01/2020 30     ANION GAP 10/01/2020 7     BUN 10/01/2020 13     Creatinine 10/01/2020 1 10     Glucose 10/01/2020 80     Calcium 10/01/2020 9 1     AST 10/01/2020 10     ALT 10/01/2020 10*    Alkaline Phosphatase 10/01/2020 93     Total Protein 10/01/2020 8 0     Albumin 10/01/2020 3 6     Total Bilirubin 10/01/2020 0 36     eGFR 10/01/2020 108     Lipase 10/01/2020 199     Extra Tube 10/01/2020 hold for add ons     LACTIC ACID 10/01/2020 1 5     CRP 10/02/2020 42 3*    Sodium 10/02/2020 139     Potassium 10/02/2020 4 0     Chloride 10/02/2020 107     CO2 10/02/2020 27     ANION GAP 10/02/2020 5     BUN 10/02/2020 11     Creatinine 10/02/2020 0 76     Glucose 10/02/2020 93     Calcium 10/02/2020 8 6     Corrected Calcium 10/02/2020 9 6     AST 10/02/2020 9     ALT 10/02/2020 10*    Alkaline Phosphatase 10/02/2020 78     Total Protein 10/02/2020 6 6     Albumin 10/02/2020 2 8*    Total Bilirubin 10/02/2020 0 28     eGFR 10/02/2020 148     WBC 10/02/2020 7 15     RBC 10/02/2020 4 21     Hemoglobin 10/02/2020 12 4     Hematocrit 10/02/2020 38 6     MCV 10/02/2020 92     MCH 10/02/2020 29 5     MCHC 10/02/2020 32 1     RDW 10/02/2020 14 9     MPV 10/02/2020 9 3     Platelets 06/87/5233 307     nRBC 10/02/2020 0     Neutrophils Relative 10/02/2020 80*    Immat GRANS % 10/02/2020 2     Lymphocytes Relative 10/02/2020 14     Monocytes Relative 10/02/2020 3*    Eosinophils Relative 10/02/2020 0     Basophils Relative 10/02/2020 1     Neutrophils Absolute 10/02/2020 5 74     Immature Grans Absolute 10/02/2020 0 14     Lymphocytes Absolute 10/02/2020 1 01     Monocytes Absolute 10/02/2020 0 18     Eosinophils Absolute 10/02/2020 0 02     Basophils Absolute 10/02/2020 0 06     Magnesium 10/02/2020 2 0     Phosphorus 10/02/2020 2 6*    Calprotectin 10/03/2020 836*    Sed Rate 10/05/2020 7     WBC 10/03/2020 14 67*    RBC 10/03/2020 3 98     Hemoglobin 10/03/2020 11 9*    Hematocrit 10/03/2020 35 9*    MCV 10/03/2020 90     MCH 10/03/2020 29 9     MCHC 10/03/2020 33 1     RDW 10/03/2020 14 3     MPV 10/03/2020 9 3     Platelets 51/59/8224 328     nRBC 10/03/2020 0     Neutrophils Relative 10/03/2020 86*    Immat GRANS % 10/03/2020 1     Lymphocytes Relative 10/03/2020 9*    Monocytes Relative 10/03/2020 4     Eosinophils Relative 10/03/2020 0     Basophils Relative 10/03/2020 0     Neutrophils Absolute 10/03/2020 12 45*    Immature Grans Absolute 10/03/2020 0 21*    Lymphocytes Absolute 10/03/2020 1 34     Monocytes Absolute 10/03/2020 0 63     Eosinophils Absolute 10/03/2020 0 00     Basophils Absolute 10/03/2020 0 04     Sodium 10/03/2020 142     Potassium 10/03/2020 3 9     Chloride 10/03/2020 107     CO2 10/03/2020 27     ANION GAP 10/03/2020 8     BUN 10/03/2020 6     Creatinine 10/03/2020 0 86     Glucose 10/03/2020 115     Calcium 10/03/2020 9 0     eGFR 10/03/2020 140     WBC 10/04/2020 16 78*    RBC 10/04/2020 4 47     Hemoglobin 10/04/2020 13 5     Hematocrit 10/04/2020 40 7     MCV 10/04/2020 91     MCH 10/04/2020 30 2     MCHC 10/04/2020 33 2     RDW 10/04/2020 14 9     MPV 10/04/2020 9 4     Platelets 58/61/8654 372     nRBC 10/04/2020 0     Neutrophils Relative 10/04/2020 87*    Immat GRANS % 10/04/2020 1     Lymphocytes Relative 10/04/2020 7*    Monocytes Relative 10/04/2020 5     Eosinophils Relative 10/04/2020 0     Basophils Relative 10/04/2020 0     Neutrophils Absolute 10/04/2020 14 53*    Immature Grans Absolute 10/04/2020 0 16     Lymphocytes Absolute 10/04/2020 1 25     Monocytes Absolute 10/04/2020 0 80     Eosinophils Absolute 10/04/2020 0 00     Basophils Absolute 10/04/2020 0 04     Sodium 10/04/2020 140     Potassium 10/04/2020 4 1     Chloride 10/04/2020 105     CO2 10/04/2020 28     ANION GAP 10/04/2020 7     BUN 10/04/2020 11     Creatinine 10/04/2020 0 98     Glucose 10/04/2020 117     Calcium 10/04/2020 9 2     eGFR 10/04/2020 124     WBC 10/05/2020 14 75*    RBC 10/05/2020 4 61     Hemoglobin 10/05/2020 13 7     Hematocrit 10/05/2020 42 0     MCV 10/05/2020 91     MCH 10/05/2020 29 7     MCHC 10/05/2020 32 6     RDW 10/05/2020 14 8     MPV 10/05/2020 9 2     Platelets 49/35/7893 337     nRBC 10/05/2020 0     Neutrophils Relative 10/05/2020 76*    Immat GRANS % 10/05/2020 2     Lymphocytes Relative 10/05/2020 15     Monocytes Relative 10/05/2020 7     Eosinophils Relative 10/05/2020 0     Basophils Relative 10/05/2020 0     Neutrophils Absolute 10/05/2020 11 28*    Immature Grans Absolute 10/05/2020 0 22*    Lymphocytes Absolute 10/05/2020 2 22     Monocytes Absolute 10/05/2020 0 99     Eosinophils Absolute 10/05/2020 0 00     Basophils Absolute 10/05/2020 0 04     Sodium 10/05/2020 138     Potassium 10/05/2020 3 8     Chloride 10/05/2020 104     CO2 10/05/2020 27     ANION GAP 10/05/2020 7     BUN 10/05/2020 14     Creatinine 10/05/2020 0 91     Glucose 10/05/2020 92     Calcium 10/05/2020 8 9     eGFR 10/05/2020 136          Radiology Results:   No results found

## 2021-02-19 NOTE — PLAN OF CARE
Problem: Nutrition/Hydration-ADULT  Goal: Nutrient/Hydration intake appropriate for improving, restoring or maintaining nutritional needs  Description  Monitor and assess patient's nutrition/hydration status for malnutrition  Collaborate with interdisciplinary team and initiate plan and interventions as ordered  Monitor patient's weight and dietary intake as ordered or per policy  Utilize nutrition screening tool and intervene as necessary  Determine patient's food preferences and provide high-protein, high-caloric foods as appropriate       INTERVENTIONS:  - Monitor oral intake, urinary output, labs, and treatment plans  - Assess nutrition and hydration status and recommend course of action  - Evaluate amount of meals eaten  - Allow adequate time for meals  - Recommend/ encourage appropriate diets, oral nutritional supplements, and vitamin/mineral supplements  - Order, calculate, and assess calorie counts as needed  - Assess need for intravenous fluids  - Provide specific nutrition/hydration education as appropriate    Outcome: Progressing     Problem: GASTROINTESTINAL - ADULT  Goal: Minimal or absence of nausea and/or vomiting  Description  INTERVENTIONS:  - Administer IV fluids if ordered to ensure adequate hydration  - Maintain NPO status until nausea and vomiting are resolved  - Administer ordered antiemetic medications as needed  - Provide nonpharmacologic comfort measures as appropriate  - Advance diet as tolerated, if ordered  - Consider nutrition services referral to assist patient with adequate nutrition and appropriate food choices   Outcome: Progressing  Goal: Maintains or returns to baseline bowel function  Description  INTERVENTIONS:  - Assess bowel function  - Encourage oral fluids to ensure adequate hydration  - Administer IV fluids if ordered to ensure adequate hydration  - Administer ordered medications as needed  - Encourage mobilization and activity  - Consider nutritional services referral to left arm/yes assist patient with adequate nutrition and appropriate food choices  Outcome: Progressing  Goal: Maintains adequate nutritional intake  Description  INTERVENTIONS:  - Monitor percentage of each meal consumed  - Identify factors contributing to decreased intake, treat as appropriate  - Assist with meals as needed  - Monitor I&O, weight, and lab values if indicated  - Obtain nutrition services referral as needed  Outcome: Progressing     Problem: PAIN - ADULT  Goal: Verbalizes/displays adequate comfort level or baseline comfort level  Description  Interventions:  - Encourage patient to monitor pain and request assistance  - Assess pain using appropriate pain scale  - Administer analgesics based on type and severity of pain and evaluate response  - Implement non-pharmacological measures as appropriate and evaluate response  - Notify physician/advanced practitioner if interventions unsuccessful or patient reports new pain   Outcome: Progressing

## 2021-07-01 ENCOUNTER — TELEPHONE (OUTPATIENT)
Dept: GASTROENTEROLOGY | Facility: CLINIC | Age: 26
End: 2021-07-01

## 2021-07-01 NOTE — TELEPHONE ENCOUNTER
Left message for patient to call and schedule ov with Dr Ab Araujo to discuss covid vaccine and his health  Will call him again in 1 wk if he doesn't return call

## 2021-07-01 NOTE — TELEPHONE ENCOUNTER
Please call patient to schedule ov with Dr Cyndee Isabel to discuss covid vaccines and his health conditions   Thank you

## 2021-08-19 ENCOUNTER — TELEMEDICINE (OUTPATIENT)
Dept: GASTROENTEROLOGY | Facility: CLINIC | Age: 26
End: 2021-08-19
Payer: COMMERCIAL

## 2021-08-19 DIAGNOSIS — K21.00 GASTROESOPHAGEAL REFLUX DISEASE WITH ESOPHAGITIS WITHOUT HEMORRHAGE: Primary | ICD-10-CM

## 2021-08-19 DIAGNOSIS — K50.818 CROHN'S DISEASE OF BOTH SMALL AND LARGE INTESTINE WITH OTHER COMPLICATION (HCC): ICD-10-CM

## 2021-08-19 PROCEDURE — 99213 OFFICE O/P EST LOW 20 MIN: CPT | Performed by: INTERNAL MEDICINE

## 2021-08-19 NOTE — PROGRESS NOTES
Virtual Regular Visit    Verification of patient location:    Patient is located in the following state in which I hold an active license [place]      Assessment/Plan:  Reflux is under control Crohn's disease is under control  He needs to get his CMP CBC and B12  He should follow-up in 3 months  Continue current management    Problem List Items Addressed This Visit        Digestive    Gastroesophageal reflux disease with esophagitis without hemorrhage - Primary    Crohn's disease of both small and large intestine ([address] )               Reason for visit is   Chief Complaint   Patient presents with    Virtual Regular Visit        Encounter provider Daphne Ayala MD    Provider located at [address]      Recent Visits  No visits were found meeting these conditions  Showing recent visits within past 7 days and meeting all other requirements  Future Appointments  No visits were found meeting these conditions  Showing future appointments within next 150 days and meeting all other requirements       The patient was identified by name and date of birth  Sudha Maloney was informed that this is a telemedicine visit and that the visit is being conducted through [address] [place] [address] Now and patient was informed that this is a secure, HIPAA-compliant platform  He agrees to proceed     My office door was closed  No one else was in the room  He acknowledged consent and understanding of privacy and security of the video platform  The patient has agreed to participate and understands they can discontinue the visit at any time  Patient is aware this is a billable service  Subjective  Sudha Maloney is a 23 y o  male   With a history of Crohn's disease  Was malnourished had severe reflux and chronic abdominal pain    He has been on Entyvio in doing well no abdominal pains his reflux is resolved  He has labs that he has not done yet  I encouraged him to do so  He is back at work driving for [employer]  We discussed the COVID vaccine  Radha Lightning HPI     Past Medical History:   Diagnosis Date    Asthma     Crohn disease [address] [address] )        No past surgical history on file  Current Outpatient Medications   Medication Sig Dispense Refill    omeprazole (PriLOSEC) 40 MG capsule Take 40 mg by mouth daily      predniSONE 5 mg tablet Take 40 mg PO daily for 1 week then taper down by 5 mg weekly for 8 week taper  (Patient not taking: Reported on 2/3/2021) 252 tablet 0    vedolizumab (Entyvio) 300 MG SOLR Infuse 300 mg into a venous catheter Every 6 weeks       No current facility-administered medications for this visit  No Known Allergies    Review of Systems    Video Exam    There were no vitals filed for this visit  Physical Exam   Made good eye contact was in no apparent distress abdomen appear benign  Was able to move around without difficulty  No obvious rashes or focal neuro defects  I spent 9:31 minutes directly with the patient during this visit    VIRTUAL VISIT DISCLAIMER      Estela Palma verbally agrees to participate in [employer]  Pt is aware that [employer] could be limited without vital signs or the ability to perform a full hands-on physical Jeanne Rowe understands he or the provider may request at any time to terminate the video visit and request the patient to seek care or treatment in person

## 2021-10-06 ENCOUNTER — TELEPHONE (OUTPATIENT)
Dept: GASTROENTEROLOGY | Facility: CLINIC | Age: 26
End: 2021-10-06

## 2022-02-10 ENCOUNTER — TELEPHONE (OUTPATIENT)
Dept: GASTROENTEROLOGY | Facility: CLINIC | Age: 27
End: 2022-02-10

## 2022-02-10 NOTE — TELEPHONE ENCOUNTER
Pt called and lmom @ 547pm yesterday asking for a call back  He said he needs a note that he has crohn's for work  He said he wants to return to work, but can not without a note  Can you call him? Thank you

## 2022-02-14 ENCOUNTER — TELEPHONE (OUTPATIENT)
Dept: GASTROENTEROLOGY | Facility: CLINIC | Age: 27
End: 2022-02-14

## 2022-02-14 NOTE — TELEPHONE ENCOUNTER
Patient had left message asking for a call regarding changing the time of his 2/16 appointment due to him having to work  I returned his call and left message that the latest is a 3:30 that same day, otherwise we would need to reschedule to another day  If he calls back, please reschedule  Thank you!

## 2022-03-20 ENCOUNTER — NURSE TRIAGE (OUTPATIENT)
Dept: OTHER | Facility: OTHER | Age: 27
End: 2022-03-20

## 2022-03-20 NOTE — TELEPHONE ENCOUNTER
Reason for Disposition   [1] New-onset hernia suspected (reducible bulge in groin or abdomen; non-tender) AND [2] NO pain or vomiting    Answer Assessment - Initial Assessment Questions  1  ONSET:  "When did this first appear?"      Noticed on Friday  2  APPEARANCE: "What does it look like?"      Lump that is a bit larger then a quarter  3  SIZE: "How big is it?" (inches, cm or compare to coins, fruit)      Slightly larger then a quarter  4  LOCATION: "Where exactly is the hernia located?"      Abdomen- above belly button  5  PATTERN: "Does the swelling come and go, or has it been constant since it started?"      No    6  PAIN: "Is there any pain?" If Yes, ask: "How bad is it?"  (Scale 1-10; or mild, moderate, severe)      Tender to touch  7  DIAGNOSIS: "Have you been seen by a doctor for this?" "Did the doctor diagnose you as having a hernia?"      Says he was diagnosed w a hernia as a child- unknown location  8  OTHER SYMPTOMS: "Do you have any other symptoms?" (e g , fever, abdominal pain, vomiting)      No abdominal pain  No vomiting  No fever  No redness to skin  No other symptoms  Protocols used:  HERNIA-ADULT-

## 2022-03-20 NOTE — TELEPHONE ENCOUNTER
Regarding: Knot above belly button   ----- Message from [employer] sent at 3/20/2022  8:07 AM EDT -----  "Since Friday I noticed I have a knot above my belly button and when I move certain ways it hurts"

## 2022-03-21 ENCOUNTER — OFFICE VISIT (OUTPATIENT)
Dept: GASTROENTEROLOGY | Facility: CLINIC | Age: 27
End: 2022-03-21
Payer: COMMERCIAL

## 2022-03-21 VITALS
DIASTOLIC BLOOD PRESSURE: 72 MMHG | HEART RATE: 94 BPM | HEIGHT: 65 IN | WEIGHT: 131 LBS | BODY MASS INDEX: 21.83 KG/M2 | SYSTOLIC BLOOD PRESSURE: 123 MMHG

## 2022-03-21 DIAGNOSIS — K42.9 PERIUMBILICAL HERNIA: ICD-10-CM

## 2022-03-21 DIAGNOSIS — L02.221 FURUNCLE OF ABDOMINAL WALL: Primary | ICD-10-CM

## 2022-03-21 DIAGNOSIS — K50.818 CROHN'S DISEASE OF BOTH SMALL AND LARGE INTESTINE WITH OTHER COMPLICATION (HCC): ICD-10-CM

## 2022-03-21 PROCEDURE — 99214 OFFICE O/P EST MOD 30 MIN: CPT | Performed by: INTERNAL MEDICINE

## 2022-03-21 RX ORDER — CEPHALEXIN 500 MG/1
500 CAPSULE ORAL EVERY 6 HOURS SCHEDULED
Qty: 20 CAPSULE | Refills: 0 | Status: SHIPPED | OUTPATIENT
Start: 2022-03-21 | End: 2022-03-26

## 2022-03-21 NOTE — LETTER
March 21, 2022     Patient: Haley Peralta   YOB: 1995   Date of Visit: 3/21/2022       To Whom it May Concern:    Haley Peralta is under my professional care  He was seen in my office on 3/21/2022  Patient is not allowed to lift anything over 20 lbs  If you have any questions or concerns, please don't hesitate to call           Sincerely,          Kennedy Walker MD

## 2022-03-21 NOTE — PROGRESS NOTES
[facility] - Outpatient Follow-up Note  Elijah Seymour 23 y o  male MRN: [id number]  Encounter: [id number]          ASSESSMENT AND PLAN:      1  Furuncle of abdominal wall  [de-identified] year male with history of Crohn's disease, currently on Entyvio infusion every 6 weekly, usually follow-up with Dr Desirae Kelly but now come for urgent appointment for periumbilical pain and small lump  Symptoms started on Friday, he denies any fever, no chills, no nausea, no vomiting, no chronic diarrhea or bright red blood per rectum  On physical examination, there is no periumbilical hernia  Small furuncle around umbilicus, will apply it topical antibiotic cream and also oral antibiotic for 5-7 days, if symptoms do not improve then will order the CT scan of abdomen and pelvis  - mupirocin (BACTROBAN) 2 % ointment; Apply topically 3 (three) times a day  Dispense: 22 g; Refill: 0  - cephalexin (KEFLEX) 500 mg capsule; Take 1 capsule (500 mg total) by mouth every 6 (six) hours for 5 days  Dispense: 20 capsule; Refill: 0    2  Periumbilical pain secondary to small furuncle around periumbilical abdominal wall  - mupirocin (BACTROBAN) 2 % ointment; Apply topically 3 (three) times a day  Dispense: 22 g; Refill: 0  - cephalexin (KEFLEX) 500 mg capsule; Take 1 capsule (500 mg total) by mouth every 6 (six) hours for 5 days  Dispense: 20 capsule; Refill: 0    3   Crohn's disease of both small and large intestine with other complication ([address] )  Crohn's related symptoms including abdominal pain, diarrhea and rectal bleeding are under remission with Entyvio infusion Q 6 weekly a patient is up-to-date with EGD and colonoscopy    ______________________________________________________________________    SUBJECTIVE:  29-year-old male with history of Crohn's disease, currently on Entyvio infusion every 6 weekly, usually follow-up with Dr Desirae Kelly but now come for urgent office visit because of periumbilical abdominal pain and he noticed small bulging around periumbilical area  Symptoms started on Friday, no nausea, no vomiting, no redness around periumbilical area  He denies any rectal bleeding or change in bowel habit  He is getting Entyvio infusion every 6 weekly and up-to-date with EGD and colonoscopy      REVIEW OF SYSTEMS IS OTHERWISE NEGATIVE  Historical Information   Past Medical History:   Diagnosis Date    Asthma     Crohn disease ([facility] [address] )      History reviewed  No pertinent surgical history  Social History   Social History     Substance and Sexual Activity   Alcohol Use Never     Social History     Substance and Sexual Activity   Drug Use Never     Social History     Tobacco Use   Smoking Status Never Smoker   Smokeless Tobacco Never Used     Family History   Problem Relation Age of Onset    Asthma Mother        Meds/Allergies       Current Outpatient Medications:     vedolizumab (Entyvio) 300 MG SOLR    cephalexin (KEFLEX) 500 mg capsule    mupirocin (BACTROBAN) 2 % ointment    omeprazole (PriLOSEC) 40 MG capsule    predniSONE 5 mg tablet    No Known Allergies        Objective     Blood pressure 123/72, pulse 94, height 5' 5" (1 651 m), weight 59 4 kg (131 lb)  Body mass index is 21 8 kg/m²  PHYSICAL EXAM:      General Appearance:   Alert, cooperative, no distress   HEENT:   Normocephalic, atraumatic, anicteric      Neck:  Supple, symmetrical, trachea midline   Lungs:   Clear to auscultation bilaterally; no rales, rhonchi or wheezing; respirations unlabored    Heart[de-identified]   Regular rate and rhythm; no murmur, rub, or gallop     Abdomen:   Soft, non-tender, non-distended; normal bowel sounds; no masses, no organomegaly , no hernia around periumbilical area, small boils/furancle around umbilicus    Genitalia:   Deferred    Rectal:   Deferred    Extremities:  No cyanosis, clubbing or edema    Pulses:  2+ and symmetric    Skin:  No jaundice, rashes, or lesions    Lymph nodes:  No palpable cervical lymphadenopathy  Lab Results:   No visits with results within 1 Day(s) from this visit     Latest known visit with results is:   Admission on 10/01/2020, Discharged on 10/05/2020   Component Date Value    WBC 10/01/2020 11 31*    RBC 10/01/2020 5 12     Hemoglobin 10/01/2020 15 0     Hematocrit 10/01/2020 46 8     MCV 10/01/2020 91     MCH 10/01/2020 29 3     MCHC 10/01/2020 32 1     RDW 10/01/2020 14 9     MPV 10/01/2020 9 1     Platelets 77/72/2437 366     nRBC 10/01/2020 0     Neutrophils Relative 10/01/2020 60     Immat GRANS % 10/01/2020 1     Lymphocytes Relative 10/01/2020 27     Monocytes Relative 10/01/2020 10     Eosinophils Relative 10/01/2020 1     Basophils Relative 10/01/2020 1     Neutrophils Absolute 10/01/2020 6 83     Immature Grans Absolute 10/01/2020 0 12     Lymphocytes Absolute 10/01/2020 3 09     Monocytes Absolute 10/01/2020 1 10     Eosinophils Absolute 10/01/2020 0 08     Basophils Absolute 10/01/2020 0 09     Sodium 10/01/2020 141     Potassium 10/01/2020 3 6     Chloride 10/01/2020 104     CO2 10/01/2020 30     ANION GAP 10/01/2020 7     BUN 10/01/2020 13     Creatinine 10/01/2020 1 10     Glucose 10/01/2020 80     Calcium 10/01/2020 9 1     AST 10/01/2020 10     ALT 10/01/2020 10*    Alkaline Phosphatase 10/01/2020 93     Total Protein 10/01/2020 8 0     Albumin 10/01/2020 3 6     Total Bilirubin 10/01/2020 0 36     eGFR 10/01/2020 108     Lipase 10/01/2020 199     Extra Tube 10/01/2020 hold for add ons     LACTIC ACID 10/01/2020 1 5     CRP 10/02/2020 42 3*    Sodium 10/02/2020 139     Potassium 10/02/2020 4 0     Chloride 10/02/2020 107     CO2 10/02/2020 27     ANION GAP 10/02/2020 5     BUN 10/02/2020 11     Creatinine 10/02/2020 0 76     Glucose 10/02/2020 93     Calcium 10/02/2020 8 6     Corrected Calcium 10/02/2020 9 6     AST 10/02/2020 9     ALT 10/02/2020 10*    Alkaline Phosphatase 10/02/2020 78     Total Protein 10/02/2020 6 6     Albumin 10/02/2020 2 8*    Total Bilirubin 10/02/2020 0 28     eGFR 10/02/2020 148     WBC 10/02/2020 7 15     RBC 10/02/2020 4 21     Hemoglobin 10/02/2020 12 4     Hematocrit 10/02/2020 38 6     MCV 10/02/2020 92     MCH 10/02/2020 29 5     MCHC 10/02/2020 32 1     RDW 10/02/2020 14 9     MPV 10/02/2020 9 3     Platelets 90/15/3069 307     nRBC 10/02/2020 0     Neutrophils Relative 10/02/2020 80*    Immat GRANS % 10/02/2020 2     Lymphocytes Relative 10/02/2020 14     Monocytes Relative 10/02/2020 3*    Eosinophils Relative 10/02/2020 0     Basophils Relative 10/02/2020 1     Neutrophils Absolute 10/02/2020 5 74     Immature Grans Absolute 10/02/2020 0 14     Lymphocytes Absolute 10/02/2020 1 01     Monocytes Absolute 10/02/2020 0 18     Eosinophils Absolute 10/02/2020 0 02     Basophils Absolute 10/02/2020 0 06     Magnesium 10/02/2020 2 0     Phosphorus 10/02/2020 2 6*    Calprotectin 10/03/2020 836*    Sed Rate 10/05/2020 7     WBC 10/03/2020 14 67*    RBC 10/03/2020 3 98     Hemoglobin 10/03/2020 11 9*    Hematocrit 10/03/2020 35 9*    MCV 10/03/2020 90     MCH 10/03/2020 29 9     MCHC 10/03/2020 33 1     RDW 10/03/2020 14 3     MPV 10/03/2020 9 3     Platelets 09/47/5186 328     nRBC 10/03/2020 0     Neutrophils Relative 10/03/2020 86*    Immat GRANS % 10/03/2020 1     Lymphocytes Relative 10/03/2020 9*    Monocytes Relative 10/03/2020 4     Eosinophils Relative 10/03/2020 0     Basophils Relative 10/03/2020 0     Neutrophils Absolute 10/03/2020 12 45*    Immature Grans Absolute 10/03/2020 0 21*    Lymphocytes Absolute 10/03/2020 1 34     Monocytes Absolute 10/03/2020 0 63     Eosinophils Absolute 10/03/2020 0 00     Basophils Absolute 10/03/2020 0 04     Sodium 10/03/2020 142     Potassium 10/03/2020 3 9     Chloride 10/03/2020 107     CO2 10/03/2020 27     ANION GAP 10/03/2020 8     BUN 10/03/2020 6     Creatinine 10/03/2020 0 86     Glucose 10/03/2020 115     Calcium 10/03/2020 9 0     eGFR 10/03/2020 140     WBC 10/04/2020 16 78*    RBC 10/04/2020 4 47     Hemoglobin 10/04/2020 13 5     Hematocrit 10/04/2020 40 7     MCV 10/04/2020 91     MCH 10/04/2020 30 2     MCHC 10/04/2020 33 2     RDW 10/04/2020 14 9     MPV 10/04/2020 9 4     Platelets 26/93/8038 372     nRBC 10/04/2020 0     Neutrophils Relative 10/04/2020 87*    Immat GRANS % 10/04/2020 1     Lymphocytes Relative 10/04/2020 7*    Monocytes Relative 10/04/2020 5     Eosinophils Relative 10/04/2020 0     Basophils Relative 10/04/2020 0     Neutrophils Absolute 10/04/2020 14 53*    Immature Grans Absolute 10/04/2020 0 16     Lymphocytes Absolute 10/04/2020 1 25     Monocytes Absolute 10/04/2020 0 80     Eosinophils Absolute 10/04/2020 0 00     Basophils Absolute 10/04/2020 0 04     Sodium 10/04/2020 140     Potassium 10/04/2020 4 1     Chloride 10/04/2020 105     CO2 10/04/2020 28     ANION GAP 10/04/2020 7     BUN 10/04/2020 11     Creatinine 10/04/2020 0 98     Glucose 10/04/2020 117     Calcium 10/04/2020 9 2     eGFR 10/04/2020 124     WBC 10/05/2020 14 75*    RBC 10/05/2020 4 61     Hemoglobin 10/05/2020 13 7     Hematocrit 10/05/2020 42 0     MCV 10/05/2020 91     MCH 10/05/2020 29 7     MCHC 10/05/2020 32 6     RDW 10/05/2020 14 8     MPV 10/05/2020 9 2     Platelets 15/12/6680 337     nRBC 10/05/2020 0     Neutrophils Relative 10/05/2020 76*    Immat GRANS % 10/05/2020 2     Lymphocytes Relative 10/05/2020 15     Monocytes Relative 10/05/2020 7     Eosinophils Relative 10/05/2020 0     Basophils Relative 10/05/2020 0     Neutrophils Absolute 10/05/2020 11 28*    Immature Grans Absolute 10/05/2020 0 22*    Lymphocytes Absolute 10/05/2020 2 22     Monocytes Absolute 10/05/2020 0 99     Eosinophils Absolute 10/05/2020 0 00     Basophils Absolute 10/05/2020 0 04     Sodium 10/05/2020 138     Potassium 10/05/2020 3 8     Chloride 10/05/2020 104     CO2 10/05/2020 27     ANION GAP 10/05/2020 7     BUN 10/05/2020 14     Creatinine 10/05/2020 0 91     Glucose 10/05/2020 92     Calcium 10/05/2020 8 9     eGFR 10/05/2020 136          Radiology Results:   No results found

## 2022-03-28 ENCOUNTER — TELEPHONE (OUTPATIENT)
Dept: GASTROENTEROLOGY | Facility: CLINIC | Age: 27
End: 2022-03-28

## 2022-03-28 NOTE — TELEPHONE ENCOUNTER
Patient called  He asked if he could either drop off or fax paperwork that needs to be filled out for his employer  I gave patient our [address] office address and fax number  Patient verbalized understanding   Thank you

## 2022-03-31 ENCOUNTER — TELEPHONE (OUTPATIENT)
Dept: GASTROENTEROLOGY | Facility: CLINIC | Age: 27
End: 2022-03-31

## 2022-03-31 NOTE — TELEPHONE ENCOUNTER
Patient called  He is asking for a note for work so that he can return back to work today 3/31/2022 regular duty  Please write note and let me know when it is done so I can call patient so he can pick it up today at our [address] [place] [address] office   Thank you

## 2022-05-05 ENCOUNTER — TELEPHONE (OUTPATIENT)
Dept: GASTROENTEROLOGY | Facility: AMBULARY SURGERY CENTER | Age: 27
End: 2022-05-05

## 2022-05-05 NOTE — TELEPHONE ENCOUNTER
Patients GI provider:  Dr Sourav Tyler    Number to return call: [phone]    Reason for call: Pt calling requesting for a note from chron's flare up from yesterday and today  Please send to email address [email or web address]     Scheduled procedure/appointment date if applicable: Apt/procedure n/a

## 2022-05-06 ENCOUNTER — TELEPHONE (OUTPATIENT)
Dept: GASTROENTEROLOGY | Facility: CLINIC | Age: 27
End: 2022-05-06

## 2022-07-11 ENCOUNTER — NURSE TRIAGE (OUTPATIENT)
Dept: OTHER | Facility: OTHER | Age: 27
End: 2022-07-11

## 2022-07-11 DIAGNOSIS — K50.818 CROHN'S DISEASE OF BOTH SMALL AND LARGE INTESTINE WITH OTHER COMPLICATION (HCC): Primary | ICD-10-CM

## 2022-07-11 DIAGNOSIS — R10.31 RIGHT LOWER QUADRANT ABDOMINAL PAIN: ICD-10-CM

## 2022-07-11 NOTE — TELEPHONE ENCOUNTER
Pt is requesting a note for work to be absent as needed once a month for up to 3 days for his episodes of abdominal pain  He does have a hx of Crohn's and is having some abdominal pain now  He states theses episodes happen monthly and last for up to 3 days

## 2022-07-11 NOTE — TELEPHONE ENCOUNTER
Pt of Dr Shilpi Cobos with history of crohn's disease of ileocecal/transverse colon on last EGD/Colonoscopy in July 2020 currently maintained on Entyvio infusions Q 6 weeks  Reports right lower quadrant abdominal pain which started yesterday  +nausea no vomiting  No fevers/chills  No diarrhea or blood per rectum  Has had similar symptoms previously which responded well to changing his diet to liquid which he attributes to Crohn's flare, symptoms typically last 2-3 days and are brought on by stress  He is due for Entyvio infusion and is waiting for nurse to call to schedule      -Advised he continue liquid diet until pain improves, then advance to low fiber    -Follow up for Entyvio infusion  -If he develops obstructive symptoms w/ vomiting, worsening pain, fevers/chills advised pt to present to ED for further evaluation    -Will obtain baseline labs including CBC, CMP, CRP, B12, and fecal calprotectin and have patient follow up with Dr Fabiana Lozano in office    -Pt's mom to  lab scripts as pt goes to [facility]  He may need further evaluation with imaging/endoscopy pending course     -Letter sent on [employer] to excuse him from work today/tomorrow

## 2022-07-11 NOTE — TELEPHONE ENCOUNTER
Patient is asking for note for work    Reason for Disposition   Abdominal pain is a chronic symptom (recurrent or ongoing AND present > 4 weeks)    Answer Assessment - Initial Assessment Questions  1  LOCATION: "Where does it hurt?"       Right abdomen    2  RADIATION: "Does the pain shoot anywhere else?" (e g , chest, back)      Denies    3  ONSET: "When did the pain begin?" (Minutes, hours or days ago)       The other night    4  SUDDEN: "Gradual or sudden onset?"      Sudden    5  PATTERN "Does the pain come and go, or is it constant?"     - If constant: "Is it getting better, staying the same, or worsening?"       (Note: Constant means the pain never goes away completely; most serious pain is constant and it progresses)      - If intermittent: "How long does it last?" "Do you have pain now?"      (Note: Intermittent means the pain goes away completely between bouts)      Comes and goes    6  SEVERITY: "How bad is the pain?"  (e g , Scale 1-10; mild, moderate, or severe)     - MILD (1-3): doesn't interfere with normal activities, abdomen soft and not tender to touch      - MODERATE (4-7): interferes with normal activities or awakens from sleep, tender to touch      - SEVERE (8-10): excruciating pain, doubled over, unable to do any normal activities        6/10    7  RECURRENT SYMPTOM: "Have you ever had this type of stomach pain before?" If Yes, ask: "When was the last time?" and "What happened that time?"       Yes, with crohns flare    8  CAUSE: "What do you think is causing the stomach pain?"      crohns    9  RELIEVING/AGGRAVATING FACTORS: "What makes it better or worse?" (e g , movement, antacids, bowel movement)      Some life factors feel like it could be aggrevating it    10   OTHER SYMPTOMS: "Has there been any vomiting, diarrhea, constipation, or urine problems?"        Felt nauseous    Protocols used: ABDOMINAL PAIN - MALE-ADULT-

## 2022-09-06 ENCOUNTER — TELEPHONE (OUTPATIENT)
Dept: GASTROENTEROLOGY | Facility: CLINIC | Age: 27
End: 2022-09-06

## 2022-09-06 NOTE — TELEPHONE ENCOUNTER
Patients GI provider:  Dr Praneeth Chao    Number to return call: [phone]    Reason for call: Nery cowan [place] [id number] called in requesting to have new script for entyvio and updated clinicals faxed over to [phone]       Scheduled procedure/appointment date if applicable: Apt/procedure NA

## 2022-09-07 DIAGNOSIS — K50.818 CROHN'S DISEASE OF BOTH SMALL AND LARGE INTESTINE WITH OTHER COMPLICATION (HCC): Primary | ICD-10-CM

## 2022-09-07 RX ORDER — VEDOLIZUMAB 300 MG/5ML
300 INJECTION, POWDER, LYOPHILIZED, FOR SOLUTION INTRAVENOUS SEE ADMIN INSTRUCTIONS
Qty: 5 ML | Refills: 6 | Status: SHIPPED | OUTPATIENT
Start: 2022-09-07

## 2022-10-27 ENCOUNTER — TELEPHONE (OUTPATIENT)
Dept: GASTROENTEROLOGY | Facility: CLINIC | Age: 27
End: 2022-10-27

## 2022-10-27 NOTE — TELEPHONE ENCOUNTER
Spoke with Kirit Motley from [facility] regarding patients infusions  He stated in July of 2021 there was a lapse in patients insurance that was somehow missed so patient does have an big outstanding balance with them  Kirit Motley believes he can resolve this with the patient and get this down to no cost for him  He has attempted to contact the patient twice and left detailed messages but has not heard back  His medication was last delivered in beginning of August  He has no further appointments set up with [facility] at this time, however there have been attempts to reach him

## 2022-10-27 NOTE — TELEPHONE ENCOUNTER
Called and spoke with patient  Explained Pedro's role and advised him to call him back   He will be calling Ashlyn Moncada back after work

## 2023-01-09 ENCOUNTER — TELEPHONE (OUTPATIENT)
Dept: GASTROENTEROLOGY | Facility: CLINIC | Age: 28
End: 2023-01-09

## 2023-01-13 NOTE — TELEPHONE ENCOUNTER
Pt returned call and scheduled f/u appt w/ Dr Rain Dooley for 3/24/2023  Unable to process insurance information  Pt states he has [employer] and Member ID is [id number]

## 2023-01-13 NOTE — TELEPHONE ENCOUNTER
Lmm for pt to call back and schedule an appt  Please update ins info at that time   Thanks Marlyn Escobedo

## 2023-03-24 ENCOUNTER — LAB (OUTPATIENT)
Dept: LAB | Facility: CLINIC | Age: 28
End: 2023-03-24

## 2023-03-24 ENCOUNTER — OFFICE VISIT (OUTPATIENT)
Dept: GASTROENTEROLOGY | Facility: CLINIC | Age: 28
End: 2023-03-24

## 2023-03-24 VITALS
SYSTOLIC BLOOD PRESSURE: 114 MMHG | WEIGHT: 141 LBS | DIASTOLIC BLOOD PRESSURE: 78 MMHG | HEART RATE: 68 BPM | HEIGHT: 65 IN | BODY MASS INDEX: 23.49 KG/M2

## 2023-03-24 DIAGNOSIS — K21.00 GASTROESOPHAGEAL REFLUX DISEASE WITH ESOPHAGITIS WITHOUT HEMORRHAGE: ICD-10-CM

## 2023-03-24 DIAGNOSIS — R10.31 CHRONIC RIGHT LOWER QUADRANT PAIN: Primary | ICD-10-CM

## 2023-03-24 DIAGNOSIS — K50.818 CROHN'S DISEASE OF BOTH SMALL AND LARGE INTESTINE WITH OTHER COMPLICATION (HCC): Primary | ICD-10-CM

## 2023-03-24 DIAGNOSIS — K50.818 CROHN'S DISEASE OF BOTH SMALL AND LARGE INTESTINE WITH OTHER COMPLICATION (HCC): ICD-10-CM

## 2023-03-24 DIAGNOSIS — G89.29 CHRONIC RIGHT LOWER QUADRANT PAIN: Primary | ICD-10-CM

## 2023-03-24 LAB
ALBUMIN SERPL BCP-MCNC: 3.7 G/DL (ref 3.5–5)
ALP SERPL-CCNC: 93 U/L (ref 46–116)
ALT SERPL W P-5'-P-CCNC: 15 U/L (ref 12–78)
ANION GAP SERPL CALCULATED.3IONS-SCNC: 3 MMOL/L (ref 4–13)
AST SERPL W P-5'-P-CCNC: 18 U/L (ref 5–45)
BASOPHILS # BLD AUTO: 0.06 THOUSANDS/ÂΜL (ref 0–0.1)
BASOPHILS NFR BLD AUTO: 1 % (ref 0–1)
BILIRUB SERPL-MCNC: 0.4 MG/DL (ref 0.2–1)
BUN SERPL-MCNC: 21 MG/DL (ref 5–25)
CALCIUM SERPL-MCNC: 9.4 MG/DL (ref 8.3–10.1)
CHLORIDE SERPL-SCNC: 108 MMOL/L (ref 96–108)
CO2 SERPL-SCNC: 28 MMOL/L (ref 21–32)
CREAT SERPL-MCNC: 1.1 MG/DL (ref 0.6–1.3)
CRP SERPL QL: <3 MG/L
EOSINOPHIL # BLD AUTO: 0.14 THOUSAND/ÂΜL (ref 0–0.61)
EOSINOPHIL NFR BLD AUTO: 3 % (ref 0–6)
ERYTHROCYTE [DISTWIDTH] IN BLOOD BY AUTOMATED COUNT: 13.2 % (ref 11.6–15.1)
GFR SERPL CREATININE-BSD FRML MDRD: 92 ML/MIN/1.73SQ M
GLUCOSE P FAST SERPL-MCNC: 78 MG/DL (ref 65–99)
HCT VFR BLD AUTO: 45.8 % (ref 36.5–49.3)
HGB BLD-MCNC: 15.2 G/DL (ref 12–17)
IMM GRANULOCYTES # BLD AUTO: 0.01 THOUSAND/UL (ref 0–0.2)
IMM GRANULOCYTES NFR BLD AUTO: 0 % (ref 0–2)
LYMPHOCYTES # BLD AUTO: 1.96 THOUSANDS/ÂΜL (ref 0.6–4.47)
LYMPHOCYTES NFR BLD AUTO: 38 % (ref 14–44)
MCH RBC QN AUTO: 31 PG (ref 26.8–34.3)
MCHC RBC AUTO-ENTMCNC: 33.2 G/DL (ref 31.4–37.4)
MCV RBC AUTO: 94 FL (ref 82–98)
MONOCYTES # BLD AUTO: 0.5 THOUSAND/ÂΜL (ref 0.17–1.22)
MONOCYTES NFR BLD AUTO: 10 % (ref 4–12)
NEUTROPHILS # BLD AUTO: 2.54 THOUSANDS/ÂΜL (ref 1.85–7.62)
NEUTS SEG NFR BLD AUTO: 48 % (ref 43–75)
NRBC BLD AUTO-RTO: 0 /100 WBCS
PLATELET # BLD AUTO: 278 THOUSANDS/UL (ref 149–390)
PMV BLD AUTO: 10.7 FL (ref 8.9–12.7)
POTASSIUM SERPL-SCNC: 4.2 MMOL/L (ref 3.5–5.3)
PROT SERPL-MCNC: 7.7 G/DL (ref 6.4–8.4)
RBC # BLD AUTO: 4.9 MILLION/UL (ref 3.88–5.62)
SODIUM SERPL-SCNC: 139 MMOL/L (ref 135–147)
VIT B12 SERPL-MCNC: 325 PG/ML (ref 100–900)
WBC # BLD AUTO: 5.21 THOUSAND/UL (ref 4.31–10.16)

## 2023-03-24 NOTE — PROGRESS NOTES
[facility] - Outpatient Follow-up Note  Linh Love 33 y o  male MRN: [id number]  Encounter: [id number]          ASSESSMENT AND PLAN:      1  Crohn's disease of both small and large intestine with other complication ([facility] )  Asymptomatic  He has not been on Entyvio since last summer  He is not on any other medications  His weight is stable he feels well he is eating well  We will get baseline labs performed colonoscopy to evaluate extent of disease pending that further recommendations  - Colonoscopy; Future    2  Gastroesophageal reflux disease with esophagitis without hemorrhage  Asymptomatic  He will otherwise follow-up in 4 months    ______________________________________________________________________    SUBJECTIVE: 31-year-old gentleman with Crohn's disease of small and large bowel very ill in 2020  I have not seen him in quite some time  He was last seen by Dr Danielle Jose in March 2022  For some reason his Helayne Saundra was curtailed he has not been on it since the summer  He is not on any other medications  REVIEW OF SYSTEMS IS OTHERWISE NEGATIVE  Historical Information   Past Medical History:   Diagnosis Date   • Asthma    • Crohn disease ([facility] )      History reviewed  No pertinent surgical history  Social History   Social History     Substance and Sexual Activity   Alcohol Use Never     Social History     Substance and Sexual Activity   Drug Use Never     Social History     Tobacco Use   Smoking Status Never   Smokeless Tobacco Never     Family History   Problem Relation Age of Onset   • Asthma Mother        Meds/Allergies       Current Outpatient Medications:   •  mupirocin (BACTROBAN) 2 % ointment  •  omeprazole (PriLOSEC) 40 MG capsule  •  predniSONE 5 mg tablet  •  vedolizumab (Entyvio) SOLR    No Known Allergies        Objective     Blood pressure 114/78, pulse 68, height 5' 5" (1 651 m), weight 64 kg (141 lb)  Body mass index is 23 46 kg/m²        PHYSICAL EXAM: General Appearance:   Alert, cooperative, no distress   HEENT:   Normocephalic, atraumatic, anicteric      Neck:  Supple, symmetrical, trachea midline   Lungs:   Clear to auscultation bilaterally; no rales, rhonchi or wheezing; respirations unlabored    Heart[de-identified]   Regular rate and rhythm; no murmur, rub, or gallop  Abdomen:   Soft, non-tender, non-distended; normal bowel sounds; no masses, no organomegaly    Genitalia:   Deferred    Rectal:   Deferred    Extremities:  No cyanosis, clubbing or edema    Pulses:  2+ and symmetric    Skin:  No jaundice, rashes, or lesions    Lymph nodes:  No palpable cervical lymphadenopathy        Lab Results:   No visits with results within 1 Day(s) from this visit     Latest known visit with results is:   Admission on 10/01/2020, Discharged on 10/05/2020   Component Date Value   • WBC 10/01/2020 11 31 (H)    • RBC 10/01/2020 5 12    • Hemoglobin 10/01/2020 15 0    • Hematocrit 10/01/2020 46 8    • MCV 10/01/2020 91    • MCH 10/01/2020 29 3    • MCHC 10/01/2020 32 1    • RDW 10/01/2020 14 9    • MPV 10/01/2020 9 1    • Platelets 62/22/5947 366    • nRBC 10/01/2020 0    • Neutrophils Relative 10/01/2020 60    • Immat GRANS % 10/01/2020 1    • Lymphocytes Relative 10/01/2020 27    • Monocytes Relative 10/01/2020 10    • Eosinophils Relative 10/01/2020 1    • Basophils Relative 10/01/2020 1    • Neutrophils Absolute 10/01/2020 6 83    • Immature Grans Absolute 10/01/2020 0 12    • Lymphocytes Absolute 10/01/2020 3 09    • Monocytes Absolute 10/01/2020 1 10    • Eosinophils Absolute 10/01/2020 0 08    • Basophils Absolute 10/01/2020 0 09    • Sodium 10/01/2020 141    • Potassium 10/01/2020 3 6    • Chloride 10/01/2020 104    • CO2 10/01/2020 30    • ANION GAP 10/01/2020 7    • BUN 10/01/2020 13    • Creatinine 10/01/2020 1 10    • Glucose 10/01/2020 80    • Calcium 10/01/2020 9 1    • AST 10/01/2020 10    • ALT 10/01/2020 10 (L)    • Alkaline Phosphatase 10/01/2020 93    • Total Protein 10/01/2020 8 0    • Albumin 10/01/2020 3 6    • Total Bilirubin 10/01/2020 0 36    • eGFR 10/01/2020 108    • Lipase 10/01/2020 199    • Extra Tube 10/01/2020 hold for add ons    • LACTIC ACID 10/01/2020 1 5    • CRP 10/02/2020 42 3 (H)    • Sodium 10/02/2020 139    • Potassium 10/02/2020 4 0    • Chloride 10/02/2020 107    • CO2 10/02/2020 27    • ANION GAP 10/02/2020 5    • BUN 10/02/2020 11    • Creatinine 10/02/2020 0 76    • Glucose 10/02/2020 93    • Calcium 10/02/2020 8 6    • Corrected Calcium 10/02/2020 9 6    • AST 10/02/2020 9    • ALT 10/02/2020 10 (L)    • Alkaline Phosphatase 10/02/2020 78    • Total Protein 10/02/2020 6 6    • Albumin 10/02/2020 2 8 (L)    • Total Bilirubin 10/02/2020 0 28    • eGFR 10/02/2020 148    • WBC 10/02/2020 7 15    • RBC 10/02/2020 4 21    • Hemoglobin 10/02/2020 12 4    • Hematocrit 10/02/2020 38 6    • MCV 10/02/2020 92    • MCH 10/02/2020 29 5    • MCHC 10/02/2020 32 1    • RDW 10/02/2020 14 9    • MPV 10/02/2020 9 3    • Platelets 89/60/0135 307    • nRBC 10/02/2020 0    • Neutrophils Relative 10/02/2020 80 (H)    • Immat GRANS % 10/02/2020 2    • Lymphocytes Relative 10/02/2020 14    • Monocytes Relative 10/02/2020 3 (L)    • Eosinophils Relative 10/02/2020 0    • Basophils Relative 10/02/2020 1    • Neutrophils Absolute 10/02/2020 5 74    • Immature Grans Absolute 10/02/2020 0 14    • Lymphocytes Absolute 10/02/2020 1 01    • Monocytes Absolute 10/02/2020 0 18    • Eosinophils Absolute 10/02/2020 0 02    • Basophils Absolute 10/02/2020 0 06    • Magnesium 10/02/2020 2 0    • Phosphorus 10/02/2020 2 6 (L)    • Calprotectin 10/03/2020 836 (H)    • Sed Rate 10/05/2020 7    • WBC 10/03/2020 14 67 (H)    • RBC 10/03/2020 3 98    • Hemoglobin 10/03/2020 11 9 (L)    • Hematocrit 10/03/2020 35 9 (L)    • MCV 10/03/2020 90    • MCH 10/03/2020 29 9    • MCHC 10/03/2020 33 1    • RDW 10/03/2020 14 3    • MPV 10/03/2020 9 3    • Platelets 47/25/3619 328    • nRBC 10/03/2020 0    • Neutrophils Relative 10/03/2020 86 (H)    • Immat GRANS % 10/03/2020 1    • Lymphocytes Relative 10/03/2020 9 (L)    • Monocytes Relative 10/03/2020 4    • Eosinophils Relative 10/03/2020 0    • Basophils Relative 10/03/2020 0    • Neutrophils Absolute 10/03/2020 12 45 (H)    • Immature Grans Absolute 10/03/2020 0 21 (H)    • Lymphocytes Absolute 10/03/2020 1 34    • Monocytes Absolute 10/03/2020 0 63    • Eosinophils Absolute 10/03/2020 0 00    • Basophils Absolute 10/03/2020 0 04    • Sodium 10/03/2020 142    • Potassium 10/03/2020 3 9    • Chloride 10/03/2020 107    • CO2 10/03/2020 27    • ANION GAP 10/03/2020 8    • BUN 10/03/2020 6    • Creatinine 10/03/2020 0 86    • Glucose 10/03/2020 115    • Calcium 10/03/2020 9 0    • eGFR 10/03/2020 140    • WBC 10/04/2020 16 78 (H)    • RBC 10/04/2020 4 47    • Hemoglobin 10/04/2020 13 5    • Hematocrit 10/04/2020 40 7    • MCV 10/04/2020 91    • MCH 10/04/2020 30 2    • MCHC 10/04/2020 33 2    • RDW 10/04/2020 14 9    • MPV 10/04/2020 9 4    • Platelets 95/92/8796 372    • nRBC 10/04/2020 0    • Neutrophils Relative 10/04/2020 87 (H)    • Immat GRANS % 10/04/2020 1    • Lymphocytes Relative 10/04/2020 7 (L)    • Monocytes Relative 10/04/2020 5    • Eosinophils Relative 10/04/2020 0    • Basophils Relative 10/04/2020 0    • Neutrophils Absolute 10/04/2020 14 53 (H)    • Immature Grans Absolute 10/04/2020 0 16    • Lymphocytes Absolute 10/04/2020 1 25    • Monocytes Absolute 10/04/2020 0 80    • Eosinophils Absolute 10/04/2020 0 00    • Basophils Absolute 10/04/2020 0 04    • Sodium 10/04/2020 140    • Potassium 10/04/2020 4 1    • Chloride 10/04/2020 105    • CO2 10/04/2020 28    • ANION GAP 10/04/2020 7    • BUN 10/04/2020 11    • Creatinine 10/04/2020 0 98    • Glucose 10/04/2020 117    • Calcium 10/04/2020 9 2    • eGFR 10/04/2020 124    • WBC 10/05/2020 14 75 (H)    • RBC 10/05/2020 4 61    • Hemoglobin 10/05/2020 13 7    • Hematocrit 10/05/2020 42 0    • MCV 10/05/2020 91    • MCH 10/05/2020 29 7    • MCHC 10/05/2020 32 6    • RDW 10/05/2020 14 8    • MPV 10/05/2020 9 2    • Platelets 79/58/4559 337    • nRBC 10/05/2020 0    • Neutrophils Relative 10/05/2020 76 (H)    • Immat GRANS % 10/05/2020 2    • Lymphocytes Relative 10/05/2020 15    • Monocytes Relative 10/05/2020 7    • Eosinophils Relative 10/05/2020 0    • Basophils Relative 10/05/2020 0    • Neutrophils Absolute 10/05/2020 11 28 (H)    • Immature Grans Absolute 10/05/2020 0 22 (H)    • Lymphocytes Absolute 10/05/2020 2 22    • Monocytes Absolute 10/05/2020 0 99    • Eosinophils Absolute 10/05/2020 0 00    • Basophils Absolute 10/05/2020 0 04    • Sodium 10/05/2020 138    • Potassium 10/05/2020 3 8    • Chloride 10/05/2020 104    • CO2 10/05/2020 27    • ANION GAP 10/05/2020 7    • BUN 10/05/2020 14    • Creatinine 10/05/2020 0 91    • Glucose 10/05/2020 92    • Calcium 10/05/2020 8 9    • eGFR 10/05/2020 136          Radiology Results:   No results found

## 2023-05-15 ENCOUNTER — TELEPHONE (OUTPATIENT)
Dept: GASTROENTEROLOGY | Facility: CLINIC | Age: 28
End: 2023-05-15

## 2023-05-15 NOTE — TELEPHONE ENCOUNTER
Left message reminding patient of his upcoming procedure 6/1  Will need a , will be called prior to procedure with time of arrival and if he doesn't have instructions, he can look in his my chart  Also asked him to call with new insurance

## 2023-05-25 ENCOUNTER — TELEPHONE (OUTPATIENT)
Dept: OTHER | Facility: OTHER | Age: 28
End: 2023-05-25

## 2023-05-25 NOTE — TELEPHONE ENCOUNTER
Patient is scheduled for a colonoscopy on 06-01-23  He has not received his new insurance information yet, but it will go into effect on 06-01-23  He would like to find out if he should reschedule the procedure, or will it still be covered?

## 2023-05-26 NOTE — TELEPHONE ENCOUNTER
Called and spoke to pt whom now has [address]  Pt was rescheduled to 6/15/23 to [facility], pt has instructions

## 2023-06-06 ENCOUNTER — TELEPHONE (OUTPATIENT)
Dept: OTHER | Facility: OTHER | Age: 28
End: 2023-06-06

## 2023-06-06 NOTE — TELEPHONE ENCOUNTER
Patient would like a call back from the clinical team to go over some information he saw in his chart from his March 24,2023 visit  He wants clarification on what [facility]  stands for and why it was indicated in his chart

## 2023-06-06 NOTE — TELEPHONE ENCOUNTER
Left vm to call back    [facility]  is for hierarchical condition category   Essentially it is a medical coding term for patients diagnosis

## 2023-06-14 ENCOUNTER — NURSE TRIAGE (OUTPATIENT)
Dept: OTHER | Facility: OTHER | Age: 28
End: 2023-06-14

## 2023-06-14 NOTE — TELEPHONE ENCOUNTER
"  Reason for Disposition  • Bowel prep for colonoscopy, questions about    Answer Assessment - Initial Assessment Questions  1  DATE/TIME: \"When did you have your colonoscopy? \"       Scheduled for tomorrow; had 5 packs of 17gm miralax at home and wanted to know if that would be enough  Advised patient to go to store to  the full bottle of miralax  Patient agreeable and will be going to the store to get it right now  Asked patient to call back with any further questions or concerns      Protocols used: COLONOSCOPY SYMPTOMS AND QUESTIONS-ADULT-    "

## 2023-06-14 NOTE — TELEPHONE ENCOUNTER
"Regarding: colonoscopy prep instruction  ----- Message from Mammie Schaumann sent at 6/14/2023  5:50 PM EDT -----  Florecita Big have a colonoscopy scheduled tomorrow need help with prep instruction\"    "

## 2023-06-15 ENCOUNTER — ANESTHESIA EVENT (OUTPATIENT)
Dept: GASTROENTEROLOGY | Facility: AMBULARY SURGERY CENTER | Age: 28
End: 2023-06-15

## 2023-06-15 ENCOUNTER — HOSPITAL ENCOUNTER (OUTPATIENT)
Dept: GASTROENTEROLOGY | Facility: AMBULARY SURGERY CENTER | Age: 28
Setting detail: OUTPATIENT SURGERY
End: 2023-06-15
Attending: INTERNAL MEDICINE
Payer: COMMERCIAL

## 2023-06-15 ENCOUNTER — ANESTHESIA (OUTPATIENT)
Dept: GASTROENTEROLOGY | Facility: AMBULARY SURGERY CENTER | Age: 28
End: 2023-06-15

## 2023-06-15 VITALS
DIASTOLIC BLOOD PRESSURE: 66 MMHG | SYSTOLIC BLOOD PRESSURE: 108 MMHG | HEART RATE: 68 BPM | RESPIRATION RATE: 16 BRPM | TEMPERATURE: 98 F | OXYGEN SATURATION: 100 %

## 2023-06-15 DIAGNOSIS — K50.818 CROHN'S DISEASE OF BOTH SMALL AND LARGE INTESTINE WITH OTHER COMPLICATION (HCC): ICD-10-CM

## 2023-06-15 PROCEDURE — 45380 COLONOSCOPY AND BIOPSY: CPT | Performed by: INTERNAL MEDICINE

## 2023-06-15 PROCEDURE — 88341 IMHCHEM/IMCYTCHM EA ADD ANTB: CPT | Performed by: PATHOLOGY

## 2023-06-15 PROCEDURE — 88305 TISSUE EXAM BY PATHOLOGIST: CPT | Performed by: PATHOLOGY

## 2023-06-15 PROCEDURE — 88342 IMHCHEM/IMCYTCHM 1ST ANTB: CPT | Performed by: PATHOLOGY

## 2023-06-15 PROCEDURE — 43239 EGD BIOPSY SINGLE/MULTIPLE: CPT | Performed by: INTERNAL MEDICINE

## 2023-06-15 RX ORDER — PROPOFOL 10 MG/ML
INJECTION, EMULSION INTRAVENOUS AS NEEDED
Status: DISCONTINUED | OUTPATIENT
Start: 2023-06-15 | End: 2023-06-15

## 2023-06-15 RX ORDER — PROPOFOL 10 MG/ML
INJECTION, EMULSION INTRAVENOUS CONTINUOUS PRN
Status: DISCONTINUED | OUTPATIENT
Start: 2023-06-15 | End: 2023-06-15

## 2023-06-15 RX ORDER — SODIUM CHLORIDE, SODIUM LACTATE, POTASSIUM CHLORIDE, CALCIUM CHLORIDE 600; 310; 30; 20 MG/100ML; MG/100ML; MG/100ML; MG/100ML
125 INJECTION, SOLUTION INTRAVENOUS CONTINUOUS
Status: DISCONTINUED | OUTPATIENT
Start: 2023-06-15 | End: 2023-06-19 | Stop reason: HOSPADM

## 2023-06-15 RX ORDER — LIDOCAINE HYDROCHLORIDE 10 MG/ML
INJECTION, SOLUTION EPIDURAL; INFILTRATION; INTRACAUDAL; PERINEURAL AS NEEDED
Status: DISCONTINUED | OUTPATIENT
Start: 2023-06-15 | End: 2023-06-15

## 2023-06-15 RX ADMIN — PROPOFOL 150 MG: 10 INJECTION, EMULSION INTRAVENOUS at 08:39

## 2023-06-15 RX ADMIN — PROPOFOL 130 MCG/KG/MIN: 10 INJECTION, EMULSION INTRAVENOUS at 08:39

## 2023-06-15 RX ADMIN — SODIUM CHLORIDE, SODIUM LACTATE, POTASSIUM CHLORIDE, AND CALCIUM CHLORIDE 125 ML/HR: .6; .31; .03; .02 INJECTION, SOLUTION INTRAVENOUS at 07:18

## 2023-06-15 RX ADMIN — LIDOCAINE HYDROCHLORIDE 50 MG: 10 INJECTION, SOLUTION EPIDURAL; INFILTRATION; INTRACAUDAL; PERINEURAL at 08:39

## 2023-06-15 NOTE — ANESTHESIA POSTPROCEDURE EVALUATION
Post-Op Assessment Note    CV Status:  Stable    Pain management: adequate     Mental Status:  Alert and awake   Hydration Status:  Euvolemic   PONV Controlled:  Controlled   Airway Patency:  Patent      Post Op Vitals Reviewed: Yes      Staff: CRNA         No notable events documented      BP   118/54   Temp   98 6   Pulse  70   Resp   18   SpO2   99

## 2023-06-15 NOTE — ANESTHESIA PREPROCEDURE EVALUATION
Procedure:  COLONOSCOPY    Relevant Problems   GI/HEPATIC   (+) Gastroesophageal reflux disease with esophagitis without hemorrhage      PULMONARY   (+) Asthma      Other   (+) Exacerbation of Crohn's disease (HCC)        Physical Exam    Airway    Mallampati score: I  TM Distance: >3 FB  Neck ROM: full     Dental   No notable dental hx     Cardiovascular      Pulmonary      Other Findings        Anesthesia Plan  ASA Score- 2     Anesthesia Type- IV sedation with anesthesia with ASA Monitors  Additional Monitors:   Airway Plan:           Plan Factors-    Chart reviewed  Patient summary reviewed  Induction- intravenous  Postoperative Plan-     Informed Consent- Anesthetic plan and risks discussed with patient  I personally reviewed this patient with the CRNA  Discussed and agreed on the Anesthesia Plan with the CRNA  Shona Wright

## 2023-06-15 NOTE — H&P
History and Physical -  [facility]  Ondina Martinez 33 y o  male MRN: [id number]                  HPI: Ondina Martinez is a 33y o  year old male who presents for EGD and colonoscopy for history of GERD and Crohn's disease  REVIEW OF SYSTEMS: Per the HPI, and otherwise unremarkable  Historical Information   Past Medical History:   Diagnosis Date   • Asthma    • Crohn disease ([address] )      No past surgical history on file  Social History   Social History     Substance and Sexual Activity   Alcohol Use Never     Social History     Substance and Sexual Activity   Drug Use Never     Social History     Tobacco Use   Smoking Status Never   Smokeless Tobacco Never     Family History   Problem Relation Age of Onset   • Asthma Mother        Meds/Allergies       Current Outpatient Medications:   •  vedolizumab Urvashi Man SOLR    Current Facility-Administered Medications:   •  lactated ringers infusion, 125 mL/hr, Intravenous, Continuous, 125 mL/hr at 06/15/23 0718    No Known Allergies    Objective     /66   Pulse 100   Temp 98 °F (36 7 °C) (Temporal)   Resp 18   SpO2 100%       PHYSICAL EXAM    Gen: NAD  Head: NCAT  CV: RRR  CHEST: Clear  ABD: soft, NT/ND  EXT: no edema      ASSESSMENT/PLAN:  This is a 33y o  year old male here for EGD and colonoscopy, and he is stable and optimized for his procedure

## 2023-06-20 PROCEDURE — 88341 IMHCHEM/IMCYTCHM EA ADD ANTB: CPT | Performed by: PATHOLOGY

## 2023-06-20 PROCEDURE — 88305 TISSUE EXAM BY PATHOLOGIST: CPT | Performed by: PATHOLOGY

## 2023-06-20 PROCEDURE — 88342 IMHCHEM/IMCYTCHM 1ST ANTB: CPT | Performed by: PATHOLOGY

## 2023-06-22 NOTE — RESULT ENCOUNTER NOTE
Patient was informed via my chart biopsies were benign  Repeat colonoscopy 1 year due to inflammatory bowel disease, schedule follow-up office visit

## 2023-07-01 ENCOUNTER — HOSPITAL ENCOUNTER (OUTPATIENT)
Dept: RADIOLOGY | Facility: HOSPITAL | Age: 28
Discharge: HOME | End: 2023-07-01
Attending: INTERNAL MEDICINE
Payer: COMMERCIAL

## 2023-07-01 DIAGNOSIS — K50.818 CROHN'S DISEASE OF BOTH SMALL AND LARGE INTESTINE WITH OTHER COMPLICATION (HCC): ICD-10-CM

## 2023-07-01 PROCEDURE — 74177 CT ABD & PELVIS W/CONTRAST: CPT

## 2023-07-01 PROCEDURE — G1004 CDSM NDSC: HCPCS

## 2023-07-01 RX ADMIN — IOHEXOL 100 ML: 350 INJECTION, SOLUTION INTRAVENOUS at 10:04

## 2023-07-14 ENCOUNTER — TELEPHONE (OUTPATIENT)
Dept: GASTROENTEROLOGY | Facility: CLINIC | Age: 28
End: 2023-07-14

## 2023-07-14 NOTE — RESULT ENCOUNTER NOTE
Patient was informed of results of this CT enterography.   He is to restart his Ermalinda Boards which was apparently interrupted due to insurance purposes according to patient

## 2023-07-14 NOTE — TELEPHONE ENCOUNTER
----- Message from Brittany Garcia LPN sent at 7/79/5833  8:26 AM EDT -----  Written by Butch Quevedo MD on 6/22/2023  7:56 AM EDT  Seen by patient Yadira Mar on 6/22/2023  7:58 AM    Patient aware via my chart. Colon recall entered 1 yr. Please call to schedule f/u appt.  Thank you

## 2023-07-21 ENCOUNTER — TELEPHONE (OUTPATIENT)
Dept: GASTROENTEROLOGY | Facility: CLINIC | Age: 28
End: 2023-07-21

## 2023-07-21 DIAGNOSIS — K50.818 CROHN'S DISEASE OF BOTH SMALL AND LARGE INTESTINE WITH OTHER COMPLICATION (HCC): Primary | ICD-10-CM

## 2023-07-21 RX ORDER — SODIUM CHLORIDE 9 MG/ML
20 INJECTION, SOLUTION INTRAVENOUS ONCE
Status: CANCELLED | OUTPATIENT
Start: 2023-08-25

## 2023-07-21 NOTE — TELEPHONE ENCOUNTER
Please send new prescription for Entyvio that Dr. Juan Antonio Parsons wants pt to restart. Thank you!

## 2023-07-21 NOTE — TELEPHONE ENCOUNTER
Patient stopped in, he is aware he needs an appointment, however nothing available with Dr. Ainsley Hunt. Please call and schedule f/u ov to Ct Scan and Entyvio as soon as something opens. Thank you!

## 2023-07-21 NOTE — TELEPHONE ENCOUNTER
Patient has not been on entyvio for over a year per office note. Needs TB testing, hepatitis B testing, and will alos order antibodies prior to restarting. Will need to be placed as a beacon order and done as an infusion at an infusion center or at home pending insurance. Will also cc this message to IBD team as it may also need a new auth. Anchor order placed.  Labs ordered, please inform patient to have these done ASAP

## 2023-07-21 NOTE — TELEPHONE ENCOUNTER
Authorization cannot be submitted until Q.Gold and Hep B labs are completed and resulted as negative

## 2023-07-21 NOTE — TELEPHONE ENCOUNTER
Called and informed pt he needs to get blood work done. He saw Dr. Meron Menendez in March of this year. Thank you!

## 2023-07-24 ENCOUNTER — APPOINTMENT (OUTPATIENT)
Dept: LAB | Facility: CLINIC | Age: 28
End: 2023-07-24
Payer: COMMERCIAL

## 2023-07-24 DIAGNOSIS — K50.818 CROHN'S DISEASE OF BOTH SMALL AND LARGE INTESTINE WITH OTHER COMPLICATION (HCC): ICD-10-CM

## 2023-07-24 PROCEDURE — 86480 TB TEST CELL IMMUN MEASURE: CPT

## 2023-07-24 PROCEDURE — 36415 COLL VENOUS BLD VENIPUNCTURE: CPT

## 2023-07-24 PROCEDURE — 86706 HEP B SURFACE ANTIBODY: CPT

## 2023-07-24 PROCEDURE — 82397 CHEMILUMINESCENT ASSAY: CPT

## 2023-07-24 PROCEDURE — 80280 DRUG ASSAY VEDOLIZUMAB: CPT

## 2023-07-24 PROCEDURE — 87340 HEPATITIS B SURFACE AG IA: CPT

## 2023-07-25 LAB
HBV SURFACE AB SER-ACNC: <3 MIU/ML
HBV SURFACE AG SER QL: NORMAL

## 2023-07-29 LAB
GAMMA INTERFERON BACKGROUND BLD IA-ACNC: 0.01 IU/ML
M TB IFN-G BLD-IMP: NEGATIVE
M TB IFN-G CD4+ BCKGRND COR BLD-ACNC: 0 IU/ML
M TB IFN-G CD4+ BCKGRND COR BLD-ACNC: 0.01 IU/ML
MITOGEN IGNF BCKGRD COR BLD-ACNC: >10 IU/ML

## 2023-08-02 LAB — MISCELLANEOUS LAB TEST RESULT: NORMAL

## 2023-08-18 ENCOUNTER — TELEPHONE (OUTPATIENT)
Age: 28
End: 2023-08-18

## 2023-08-21 NOTE — TELEPHONE ENCOUNTER
Called patient, spoke with him and made a virtual follow up 4/15 with Dr Chika Granger  Went over virtual visit, registration as well     ----- Message from Lucy Salcido sent at 3/25/2020  3:30 PM EDT -----      ----- Message -----  From: Titi Aden PA-C  Sent: 3/25/2020   1:44 PM EDT  To: Gastroenterology [facility]    Patient is being discharged from [address] [place] [address], please schedule for office visit within the next 2-3 weeks, he has fistulizing Crohn's disease with flare up  Was seen by Dr Chika Granger and myself here  Also, please look in to possibility of approving patient for starting Entyvio  Thank you very much 
yes

## 2023-08-23 ENCOUNTER — TELEPHONE (OUTPATIENT)
Dept: INFUSION CENTER | Facility: HOSPITAL | Age: 28
End: 2023-08-23

## 2023-08-25 ENCOUNTER — HOSPITAL ENCOUNTER (OUTPATIENT)
Dept: INFUSION CENTER | Facility: HOSPITAL | Age: 28
End: 2023-08-25
Attending: INTERNAL MEDICINE
Payer: COMMERCIAL

## 2023-08-25 VITALS
SYSTOLIC BLOOD PRESSURE: 115 MMHG | HEART RATE: 76 BPM | BODY MASS INDEX: 23.66 KG/M2 | TEMPERATURE: 97 F | WEIGHT: 142.2 LBS | OXYGEN SATURATION: 100 % | RESPIRATION RATE: 18 BRPM | DIASTOLIC BLOOD PRESSURE: 69 MMHG

## 2023-08-25 DIAGNOSIS — K50.818 CROHN'S DISEASE OF BOTH SMALL AND LARGE INTESTINE WITH OTHER COMPLICATION (HCC): Primary | ICD-10-CM

## 2023-08-25 PROCEDURE — 96365 THER/PROPH/DIAG IV INF INIT: CPT

## 2023-08-25 RX ORDER — SODIUM CHLORIDE 9 MG/ML
20 INJECTION, SOLUTION INTRAVENOUS ONCE
Status: CANCELLED | OUTPATIENT
Start: 2023-09-08

## 2023-08-25 RX ORDER — SODIUM CHLORIDE 9 MG/ML
20 INJECTION, SOLUTION INTRAVENOUS ONCE
Status: COMPLETED | OUTPATIENT
Start: 2023-08-25 | End: 2023-08-25

## 2023-08-25 RX ADMIN — VEDOLIZUMAB 300 MG: 300 INJECTION, POWDER, LYOPHILIZED, FOR SOLUTION INTRAVENOUS at 10:33

## 2023-08-25 RX ADMIN — SODIUM CHLORIDE 20 ML/HR: 0.9 INJECTION, SOLUTION INTRAVENOUS at 10:32

## 2023-08-29 ENCOUNTER — NURSE TRIAGE (OUTPATIENT)
Age: 28
End: 2023-08-29

## 2023-08-29 NOTE — TELEPHONE ENCOUNTER
----- Message from Danii Navarro sent at 8/29/2023  4:07 PM EDT -----  Pt calling stating he is having the following symptoms after his infusion, tired, joint pain, intestinal pain around kidney areas and his back

## 2023-08-29 NOTE — TELEPHONE ENCOUNTER
SPOKE WITH PT, HX CROHN'S DISEASE, HAD ENTYVIO INFUSION ON 8/25/23, SINCE THEN PT C/O JOINT PAIN, FATIGUE, INTESTINAL PAIN AND BACK PAIN WHICH SEEMS TO BE WORSE AT NIGHT, RATES PAIN 5/10. DENIES FEVER, CHILLS, N/V, BLACK OR BLOODY STOOL. WHAT DO YOU RECOMMEND?

## 2023-08-30 ENCOUNTER — LAB (OUTPATIENT)
Dept: LAB | Facility: CLINIC | Age: 28
End: 2023-08-30
Payer: COMMERCIAL

## 2023-08-30 DIAGNOSIS — K50.80 CROHN'S DISEASE OF BOTH SMALL AND LARGE INTESTINE WITHOUT COMPLICATION (HCC): ICD-10-CM

## 2023-08-30 DIAGNOSIS — R10.84 GENERALIZED ABDOMINAL PAIN: ICD-10-CM

## 2023-08-30 DIAGNOSIS — R10.84 GENERALIZED ABDOMINAL PAIN: Primary | ICD-10-CM

## 2023-08-30 DIAGNOSIS — K50.80 CROHN'S DISEASE OF BOTH SMALL AND LARGE INTESTINE WITHOUT COMPLICATION (HCC): Primary | ICD-10-CM

## 2023-08-30 LAB
ALBUMIN SERPL BCP-MCNC: 3.8 G/DL (ref 3.5–5)
ALP SERPL-CCNC: 97 U/L (ref 34–104)
ALT SERPL W P-5'-P-CCNC: 9 U/L (ref 7–52)
ANION GAP SERPL CALCULATED.3IONS-SCNC: 10 MMOL/L
AST SERPL W P-5'-P-CCNC: 17 U/L (ref 13–39)
BASOPHILS # BLD AUTO: 0.06 THOUSANDS/ÂΜL (ref 0–0.1)
BASOPHILS NFR BLD AUTO: 1 % (ref 0–1)
BILIRUB SERPL-MCNC: 0.33 MG/DL (ref 0.2–1)
BUN SERPL-MCNC: 16 MG/DL (ref 5–25)
CALCIUM SERPL-MCNC: 9.3 MG/DL (ref 8.4–10.2)
CHLORIDE SERPL-SCNC: 102 MMOL/L (ref 96–108)
CO2 SERPL-SCNC: 26 MMOL/L (ref 21–32)
CREAT SERPL-MCNC: 1.05 MG/DL (ref 0.6–1.3)
CRP SERPL QL: 24.9 MG/L
EOSINOPHIL # BLD AUTO: 0.19 THOUSAND/ÂΜL (ref 0–0.61)
EOSINOPHIL NFR BLD AUTO: 3 % (ref 0–6)
ERYTHROCYTE [DISTWIDTH] IN BLOOD BY AUTOMATED COUNT: 13.3 % (ref 11.6–15.1)
GFR SERPL CREATININE-BSD FRML MDRD: 96 ML/MIN/1.73SQ M
GLUCOSE SERPL-MCNC: 67 MG/DL (ref 65–140)
HCT VFR BLD AUTO: 42.7 % (ref 36.5–49.3)
HGB BLD-MCNC: 14 G/DL (ref 12–17)
IMM GRANULOCYTES # BLD AUTO: 0.01 THOUSAND/UL (ref 0–0.2)
IMM GRANULOCYTES NFR BLD AUTO: 0 % (ref 0–2)
LYMPHOCYTES # BLD AUTO: 1.55 THOUSANDS/ÂΜL (ref 0.6–4.47)
LYMPHOCYTES NFR BLD AUTO: 28 % (ref 14–44)
MCH RBC QN AUTO: 30 PG (ref 26.8–34.3)
MCHC RBC AUTO-ENTMCNC: 32.8 G/DL (ref 31.4–37.4)
MCV RBC AUTO: 91 FL (ref 82–98)
MONOCYTES # BLD AUTO: 0.55 THOUSAND/ÂΜL (ref 0.17–1.22)
MONOCYTES NFR BLD AUTO: 10 % (ref 4–12)
NEUTROPHILS # BLD AUTO: 3.23 THOUSANDS/ÂΜL (ref 1.85–7.62)
NEUTS SEG NFR BLD AUTO: 58 % (ref 43–75)
NRBC BLD AUTO-RTO: 0 /100 WBCS
PLATELET # BLD AUTO: 318 THOUSANDS/UL (ref 149–390)
PMV BLD AUTO: 10.4 FL (ref 8.9–12.7)
POTASSIUM SERPL-SCNC: 4.1 MMOL/L (ref 3.5–5.3)
PROT SERPL-MCNC: 7.1 G/DL (ref 6.4–8.4)
RBC # BLD AUTO: 4.67 MILLION/UL (ref 3.88–5.62)
SODIUM SERPL-SCNC: 138 MMOL/L (ref 135–147)
WBC # BLD AUTO: 5.59 THOUSAND/UL (ref 4.31–10.16)

## 2023-08-30 PROCEDURE — 80053 COMPREHEN METABOLIC PANEL: CPT

## 2023-08-30 PROCEDURE — 85025 COMPLETE CBC W/AUTO DIFF WBC: CPT

## 2023-08-30 PROCEDURE — 86140 C-REACTIVE PROTEIN: CPT

## 2023-08-30 PROCEDURE — 36415 COLL VENOUS BLD VENIPUNCTURE: CPT

## 2023-08-30 NOTE — TELEPHONE ENCOUNTER
I called and spoke to patient and went over recommendations given by Dr Claudio Proctor. He verbalized understanding. Ov scheduled. Central scheduling number provided to schedule ct scan.

## 2023-08-31 ENCOUNTER — TELEPHONE (OUTPATIENT)
Age: 28
End: 2023-08-31

## 2023-08-31 NOTE — RESULT ENCOUNTER NOTE
The patient's CBC and CMP results were unremarkable/negative. C-reactive protein however was elevated at 24.9 indicating some active inflammation. We will await your CAT scan of abdomen and pelvis and clinical course. This was communicated on my chart.

## 2023-09-02 ENCOUNTER — HOSPITAL ENCOUNTER (OUTPATIENT)
Dept: RADIOLOGY | Facility: HOSPITAL | Age: 28
Discharge: HOME | End: 2023-09-02
Attending: INTERNAL MEDICINE
Payer: COMMERCIAL

## 2023-09-02 DIAGNOSIS — R10.84 GENERALIZED ABDOMINAL PAIN: ICD-10-CM

## 2023-09-02 DIAGNOSIS — K50.80 CROHN'S DISEASE OF BOTH SMALL AND LARGE INTESTINE WITHOUT COMPLICATION (HCC): ICD-10-CM

## 2023-09-02 PROCEDURE — G1004 CDSM NDSC: HCPCS

## 2023-09-02 PROCEDURE — 74177 CT ABD & PELVIS W/CONTRAST: CPT

## 2023-09-02 RX ADMIN — IOHEXOL 100 ML: 350 INJECTION, SOLUTION INTRAVENOUS at 09:28

## 2023-09-08 ENCOUNTER — HOSPITAL ENCOUNTER (OUTPATIENT)
Dept: INFUSION CENTER | Facility: HOSPITAL | Age: 28
End: 2023-09-08
Attending: INTERNAL MEDICINE
Payer: COMMERCIAL

## 2023-09-08 VITALS
SYSTOLIC BLOOD PRESSURE: 104 MMHG | DIASTOLIC BLOOD PRESSURE: 62 MMHG | HEART RATE: 89 BPM | BODY MASS INDEX: 23.48 KG/M2 | OXYGEN SATURATION: 99 % | WEIGHT: 141.09 LBS | TEMPERATURE: 97.9 F | RESPIRATION RATE: 18 BRPM

## 2023-09-08 DIAGNOSIS — K50.818 CROHN'S DISEASE OF BOTH SMALL AND LARGE INTESTINE WITH OTHER COMPLICATION (HCC): Primary | ICD-10-CM

## 2023-09-08 PROCEDURE — 96365 THER/PROPH/DIAG IV INF INIT: CPT

## 2023-09-08 RX ORDER — SODIUM CHLORIDE 9 MG/ML
20 INJECTION, SOLUTION INTRAVENOUS ONCE
OUTPATIENT
Start: 2023-10-06

## 2023-09-08 RX ORDER — SODIUM CHLORIDE 9 MG/ML
20 INJECTION, SOLUTION INTRAVENOUS ONCE
Status: COMPLETED | OUTPATIENT
Start: 2023-09-08 | End: 2023-09-08

## 2023-09-08 RX ORDER — SODIUM CHLORIDE 9 MG/ML
20 INJECTION, SOLUTION INTRAVENOUS ONCE
Status: CANCELLED | OUTPATIENT
Start: 2023-10-06

## 2023-09-08 RX ADMIN — SODIUM CHLORIDE 20 ML/HR: 9 INJECTION, SOLUTION INTRAVENOUS at 09:26

## 2023-09-08 RX ADMIN — VEDOLIZUMAB 300 MG: 300 INJECTION, POWDER, LYOPHILIZED, FOR SOLUTION INTRAVENOUS at 09:26

## 2023-09-12 NOTE — RESULT ENCOUNTER NOTE
Please call the patient regarding his abnormal result. Findings are consistent with Crohn's disease. He has an appointment on October 12. He is on Entyvio. Should he have persistent symptoms may need earlier appointment for medication changes.

## 2023-09-15 ENCOUNTER — TELEPHONE (OUTPATIENT)
Dept: GASTROENTEROLOGY | Facility: CLINIC | Age: 28
End: 2023-09-15

## 2023-09-15 NOTE — TELEPHONE ENCOUNTER
I spoke with the patient. Relayed CT scan results. Pt received 2 doses of Entyvio induction dose, 3rd dose scheduled for 10/6. Pt feeling a little better since starting Entyvio. On and off sharp crampy lower abdominal pain mostly in the AM and at night. 1 formed bowel movement daily. Denies blood, mucous, fever, n/v.     Next OV 10/12 Dr. Elissa Varela -Pt's mother dropped off papers at [facility] to be completed regarding insurance. Papers not uploaded into media. Pt states requesting paperwork to be completed by 9/22.

## 2023-09-15 NOTE — TELEPHONE ENCOUNTER
From: Julian Stauffer MD   Sent: 9/12/2023  12:44 PM EDT   To: [address] [facility]   Subject: Recent CAT scan                                   Please see my comments on CAT scan report. Derick Avery is a question of whether or not he is still on Entyvio if not we may have to reestablish it

## 2023-09-19 NOTE — TELEPHONE ENCOUNTER
I have paperwork and will be bringing to Dr. Jesse Monsivais on 9/22 in our [place] office to sign, patient aware.

## 2023-09-21 ENCOUNTER — TELEPHONE (OUTPATIENT)
Dept: GASTROENTEROLOGY | Facility: CLINIC | Age: 28
End: 2023-09-21

## 2023-09-21 NOTE — TELEPHONE ENCOUNTER
LM pt needs to reschedule appt . Dr. Ney Timmons not available 10-12-23.  Offered appt with Dr Kisha Calzada [phone]

## 2023-09-21 NOTE — TELEPHONE ENCOUNTER
Patients GI provider:  Dr. Leona Garcia    Number to return call: [phone]    Reason for call: Pt returning Dr. Leona Garcia call. Pt states he is unavailable next week and would like to schedule appt for the first week of October.  Patient is available to talk from 12pm-1pm. Please reach out to patient, thank you    Scheduled procedure/appointment date if applicable: Apt/procedure n/a

## 2023-09-22 NOTE — TELEPHONE ENCOUNTER
I called and notified patient that Dr. Renay Cueto signed the papers but is unable to sign off on the patient being disabled due to his condition. If he would like to come in for an office visit to discuss it further he can see Dr. Renay Cueto or one of the mid levels, patient verbalized understanding.  Faxed paperwork to [phone]    Please see attached

## 2023-10-05 ENCOUNTER — TELEPHONE (OUTPATIENT)
Dept: GASTROENTEROLOGY | Facility: CLINIC | Age: 28
End: 2023-10-05

## 2023-10-05 NOTE — TELEPHONE ENCOUNTER
Received call from [address] at [facility]. She stated authorization is on file through [address] however it is saying that insurance termed back in 2022. Unsure why they gave us the approval if the insurance was inactive. Auth needs to be through [employer]! [address] is initiating authorization as urgent, however patient is scheduled tomorrow at [facility].     I called patient to discuss rescheduling the appointment to Tuesday or so next week if [facility] has availability, however, patient stated he is leaving Sunday and will be gone for roughly 2 weeks.

## 2023-10-06 ENCOUNTER — HOSPITAL ENCOUNTER (OUTPATIENT)
Dept: INFUSION CENTER | Facility: HOSPITAL | Age: 28
End: 2023-10-06
Attending: INTERNAL MEDICINE
Payer: COMMERCIAL

## 2023-10-06 VITALS
WEIGHT: 139.33 LBS | RESPIRATION RATE: 16 BRPM | BODY MASS INDEX: 23.19 KG/M2 | TEMPERATURE: 98 F | OXYGEN SATURATION: 94 % | SYSTOLIC BLOOD PRESSURE: 122 MMHG | DIASTOLIC BLOOD PRESSURE: 63 MMHG | HEART RATE: 80 BPM

## 2023-10-06 DIAGNOSIS — K50.818 CROHN'S DISEASE OF BOTH SMALL AND LARGE INTESTINE WITH OTHER COMPLICATION (HCC): Primary | ICD-10-CM

## 2023-10-06 PROCEDURE — 96365 THER/PROPH/DIAG IV INF INIT: CPT

## 2023-10-06 RX ORDER — SODIUM CHLORIDE 9 MG/ML
20 INJECTION, SOLUTION INTRAVENOUS ONCE
Status: COMPLETED | OUTPATIENT
Start: 2023-10-06 | End: 2023-10-06

## 2023-10-06 RX ORDER — SODIUM CHLORIDE 9 MG/ML
20 INJECTION, SOLUTION INTRAVENOUS ONCE
OUTPATIENT
Start: 2023-12-01

## 2023-10-06 RX ADMIN — SODIUM CHLORIDE 20 ML/HR: 0.9 INJECTION, SOLUTION INTRAVENOUS at 13:01

## 2023-10-06 RX ADMIN — VEDOLIZUMAB 300 MG: 300 INJECTION, POWDER, LYOPHILIZED, FOR SOLUTION INTRAVENOUS at 13:37

## 2023-10-06 NOTE — PROGRESS NOTES
Entyvio tolerated well without complications. No complaints offered. AVS declined. Left unit in stable condition.

## 2023-10-06 NOTE — PLAN OF CARE
Problem: GASTROINTESTINAL - ADULT  Goal: Maintains or returns to baseline bowel function  Description: INTERVENTIONS:  - Assess bowel function  - Encourage oral fluids to ensure adequate hydration  - Administer ordered medications as needed  - Encourage mobilization and activity  - Consider nutritional services referral to assist patient with adequate nutrition and appropriate food choices  - Administer Entyvio as ordered  Outcome: Progressing

## 2023-10-06 NOTE — TELEPHONE ENCOUNTER
Received approval for patients infusion. Valid until 4/2024. Spoke with patient and made him aware. He does NOT have to transition to home infusions.  He would prefer to stay at the [address] [place] [address]

## 2023-10-11 ENCOUNTER — TELEPHONE (OUTPATIENT)
Age: 28
End: 2023-10-11

## 2023-10-11 NOTE — TELEPHONE ENCOUNTER
Nawaf Beach from [facility] called about clarity for why pt was told he could have infusions at the infusion center instead of home. I explained pts [facility] was run through [facility]. She stated that clarified the situation. Nothing further needed.

## 2023-10-12 NOTE — PLAN OF CARE
Problem: Nutrition/Hydration-ADULT  Goal: Nutrient/Hydration intake appropriate for improving, restoring or maintaining nutritional needs  Description: Monitor and assess patient's nutrition/hydration status for malnutrition  Collaborate with interdisciplinary team and initiate plan and interventions as ordered  Monitor patient's weight and dietary intake as ordered or per policy  Utilize nutrition screening tool and intervene as necessary  Determine patient's food preferences and provide high-protein, high-caloric foods as appropriate       INTERVENTIONS:  - Monitor oral intake, urinary output, labs, and treatment plans  - Assess nutrition and hydration status and recommend course of action  - Evaluate amount of meals eaten  - Assist patient with eating if necessary   - Allow adequate time for meals  - Recommend/ encourage appropriate diets, oral nutritional supplements, and vitamin/mineral supplements  - Order, calculate, and assess calorie counts as needed  - Recommend, monitor, and adjust tube feedings and TPN/PPN based on assessed needs  - Assess need for intravenous fluids  - Provide specific nutrition/hydration education as appropriate  - Include patient/family/caregiver in decisions related to nutrition  8/13/2020 0553 by Corey Mcnamara RN  Outcome: Progressing  8/13/2020 0552 by Corey Mcnamara RN  Outcome: Progressing     Problem: PAIN - ADULT  Goal: Verbalizes/displays adequate comfort level or baseline comfort level  Description: Interventions:  - Encourage patient to monitor pain and request assistance  - Assess pain using appropriate pain scale  - Administer analgesics based on type and severity of pain and evaluate response  - Implement non-pharmacological measures as appropriate and evaluate response  - Consider cultural and social influences on pain and pain management  - Notify physician/advanced practitioner if interventions unsuccessful or patient reports new pain  Outcome: Progressing stretcher

## 2023-11-01 ENCOUNTER — OFFICE VISIT (OUTPATIENT)
Dept: GASTROENTEROLOGY | Facility: CLINIC | Age: 28
End: 2023-11-01
Payer: COMMERCIAL

## 2023-11-01 ENCOUNTER — LAB (OUTPATIENT)
Dept: LAB | Facility: CLINIC | Age: 28
End: 2023-11-01
Payer: COMMERCIAL

## 2023-11-01 VITALS
BODY MASS INDEX: 23.16 KG/M2 | SYSTOLIC BLOOD PRESSURE: 96 MMHG | HEIGHT: 65 IN | WEIGHT: 139 LBS | HEART RATE: 78 BPM | DIASTOLIC BLOOD PRESSURE: 69 MMHG | OXYGEN SATURATION: 98 %

## 2023-11-01 DIAGNOSIS — K50.80 CROHN'S DISEASE OF BOTH SMALL AND LARGE INTESTINE WITHOUT COMPLICATION (HCC): Primary | ICD-10-CM

## 2023-11-01 DIAGNOSIS — K50.80 CROHN'S DISEASE OF BOTH SMALL AND LARGE INTESTINE WITHOUT COMPLICATION (HCC): ICD-10-CM

## 2023-11-01 LAB
ALBUMIN SERPL BCP-MCNC: 3.9 G/DL (ref 3.5–5)
ALP SERPL-CCNC: 100 U/L (ref 34–104)
ALT SERPL W P-5'-P-CCNC: 14 U/L (ref 7–52)
ANION GAP SERPL CALCULATED.3IONS-SCNC: 8 MMOL/L
AST SERPL W P-5'-P-CCNC: 32 U/L (ref 13–39)
BASOPHILS # BLD AUTO: 0.09 THOUSANDS/ÂΜL (ref 0–0.1)
BASOPHILS NFR BLD AUTO: 1 % (ref 0–1)
BILIRUB SERPL-MCNC: 0.32 MG/DL (ref 0.2–1)
BUN SERPL-MCNC: 18 MG/DL (ref 5–25)
CALCIUM SERPL-MCNC: 8.9 MG/DL (ref 8.4–10.2)
CHLORIDE SERPL-SCNC: 104 MMOL/L (ref 96–108)
CO2 SERPL-SCNC: 28 MMOL/L (ref 21–32)
CREAT SERPL-MCNC: 1.05 MG/DL (ref 0.6–1.3)
CRP SERPL QL: 8.9 MG/L
EOSINOPHIL # BLD AUTO: 0.22 THOUSAND/ÂΜL (ref 0–0.61)
EOSINOPHIL NFR BLD AUTO: 4 % (ref 0–6)
ERYTHROCYTE [DISTWIDTH] IN BLOOD BY AUTOMATED COUNT: 13.4 % (ref 11.6–15.1)
GFR SERPL CREATININE-BSD FRML MDRD: 96 ML/MIN/1.73SQ M
GLUCOSE SERPL-MCNC: 70 MG/DL (ref 65–140)
HCT VFR BLD AUTO: 43.5 % (ref 36.5–49.3)
HGB BLD-MCNC: 14.2 G/DL (ref 12–17)
IMM GRANULOCYTES # BLD AUTO: 0.01 THOUSAND/UL (ref 0–0.2)
IMM GRANULOCYTES NFR BLD AUTO: 0 % (ref 0–2)
LYMPHOCYTES # BLD AUTO: 2.16 THOUSANDS/ÂΜL (ref 0.6–4.47)
LYMPHOCYTES NFR BLD AUTO: 34 % (ref 14–44)
MCH RBC QN AUTO: 29.5 PG (ref 26.8–34.3)
MCHC RBC AUTO-ENTMCNC: 32.6 G/DL (ref 31.4–37.4)
MCV RBC AUTO: 90 FL (ref 82–98)
MONOCYTES # BLD AUTO: 0.55 THOUSAND/ÂΜL (ref 0.17–1.22)
MONOCYTES NFR BLD AUTO: 9 % (ref 4–12)
NEUTROPHILS # BLD AUTO: 3.33 THOUSANDS/ÂΜL (ref 1.85–7.62)
NEUTS SEG NFR BLD AUTO: 52 % (ref 43–75)
NRBC BLD AUTO-RTO: 0 /100 WBCS
PLATELET # BLD AUTO: 333 THOUSANDS/UL (ref 149–390)
PMV BLD AUTO: 10.2 FL (ref 8.9–12.7)
POTASSIUM SERPL-SCNC: 4 MMOL/L (ref 3.5–5.3)
PROT SERPL-MCNC: 7.2 G/DL (ref 6.4–8.4)
RBC # BLD AUTO: 4.82 MILLION/UL (ref 3.88–5.62)
SODIUM SERPL-SCNC: 140 MMOL/L (ref 135–147)
WBC # BLD AUTO: 6.36 THOUSAND/UL (ref 4.31–10.16)

## 2023-11-01 PROCEDURE — 36415 COLL VENOUS BLD VENIPUNCTURE: CPT

## 2023-11-01 PROCEDURE — 99214 OFFICE O/P EST MOD 30 MIN: CPT | Performed by: INTERNAL MEDICINE

## 2023-11-01 PROCEDURE — 86140 C-REACTIVE PROTEIN: CPT

## 2023-11-01 PROCEDURE — 80053 COMPREHEN METABOLIC PANEL: CPT

## 2023-11-01 PROCEDURE — 85025 COMPLETE CBC W/AUTO DIFF WBC: CPT

## 2023-11-01 NOTE — PROGRESS NOTES
[facility] - Outpatient Follow-up [facility]  Majano Manual 33 y.o. male MRN: [id number]  Encounter: [id number]          ASSESSMENT AND PLAN:      1. Crohn's disease of both small and large intestine without complication ([address] )  Had CT enterography earlier this year when he was off his Entyvio due to administrative reasons. He is now back on doing well 1-2 bowel motions a day. No blood. He is due for colonoscopy in May 2024 we will refer him to the IBD specialist.  Patient does not desire any immunizations. He will otherwise follow-up here in 6 months  - Comprehensive metabolic panel; Future  - C-reactive protein; Future  - CBC and differential; Future  - Ambulatory Referral to Gastroenterology; Future    ______________________________________________________________________    SUBJECTIVE: Very pleasant 50-year-old gentleman with difficult to control Crohn's disease does very well on Entyvio. His treatment was curtailed due to administrative reasons earlier this year CT enterography at that time showed some activity in the terminal ileum and the colon. Now back on Entyvio no abdominal pain holding his weight 1-2 bowel motions a day without blood. He previously had reflux no complaints of reflux today and does have a history of having had C. difficile. He does not wish to receive any immunizations. REVIEW OF SYSTEMS IS OTHERWISE NEGATIVE. Historical Information   Past Medical History:   Diagnosis Date    Asthma     Crohn disease ([address] )      History reviewed. No pertinent surgical history. Social History   Social History     Substance and Sexual Activity   Alcohol Use Never     Social History     Substance and Sexual Activity   Drug Use Never     Social History     Tobacco Use   Smoking Status Never   Smokeless Tobacco Never     Family History   Problem Relation Age of Onset    Asthma Mother        Meds/Allergies     No current outpatient medications on file.     No Known Allergies        Objective     Blood pressure 96/69, pulse 78, height 5' 5" (1.651 m), weight 63 kg (139 lb), SpO2 98 %. Body mass index is 23.13 kg/m². PHYSICAL EXAM:      General Appearance:   Alert, cooperative, no distress   HEENT:   Normocephalic, atraumatic, anicteric. Neck:  Supple, symmetrical, trachea midline   Lungs:   Clear to auscultation bilaterally; no rales, rhonchi or wheezing; respirations unlabored    Heart[de-identified]   Regular rate and rhythm; no murmur, rub, or gallop. Abdomen:   Soft, non-tender, non-distended; normal bowel sounds; no masses, no organomegaly    Genitalia:   Deferred    Rectal:   Deferred    Extremities:  No cyanosis, clubbing or edema    Pulses:  2+ and symmetric    Skin:  No jaundice, rashes, or lesions    Lymph nodes:  No palpable cervical lymphadenopathy        Lab Results:   No visits with results within 1 Day(s) from this visit.    Latest known visit with results is:   Lab on 08/30/2023   Component Date Value    WBC 08/30/2023 5.59     RBC 08/30/2023 4.67     Hemoglobin 08/30/2023 14.0     Hematocrit 08/30/2023 42.7     MCV 08/30/2023 91     MCH 08/30/2023 30.0     MCHC 08/30/2023 32.8     RDW 08/30/2023 13.3     MPV 08/30/2023 10.4     Platelets 77/14/0278 318     nRBC 08/30/2023 0     Neutrophils Relative 08/30/2023 58     Immat GRANS % 08/30/2023 0     Lymphocytes Relative 08/30/2023 28     Monocytes Relative 08/30/2023 10     Eosinophils Relative 08/30/2023 3     Basophils Relative 08/30/2023 1     Neutrophils Absolute 08/30/2023 3.23     Immature Grans Absolute 08/30/2023 0.01     Lymphocytes Absolute 08/30/2023 1.55     Monocytes Absolute 08/30/2023 0.55     Eosinophils Absolute 08/30/2023 0.19     Basophils Absolute 08/30/2023 0.06     CRP 08/30/2023 24.9 (H)     Sodium 08/30/2023 138     Potassium 08/30/2023 4.1     Chloride 08/30/2023 102     CO2 08/30/2023 26     ANION GAP 08/30/2023 10     BUN 08/30/2023 16     Creatinine 08/30/2023 1.05     Glucose 08/30/2023 67     Calcium 08/30/2023 9.3     AST 08/30/2023 17     ALT 08/30/2023 9     Alkaline Phosphatase 08/30/2023 97     Total Protein 08/30/2023 7.1     Albumin 08/30/2023 3.8     Total Bilirubin 08/30/2023 0.33     eGFR 08/30/2023 96          Radiology Results:   No results found.   Answers submitted by the patient for this visit:  Inflammatory Bowel Disease (Submitted on 10/31/2023)  When you are not experiencing symptoms of your inflammatory bowel disease, how many bowel movements do you typically have each day?: 2  What is the average (typical) number of bowel movements that you had in a single day during the last week?: 3  Over the last 3 days, have you had any bowel movements where you passed blood without stool?: No  Since your last visit, have you received any vaccinations?: No  Since the last visit, have you had an infection?: No  In the past three months, have you used tobacco in any form?: No  During the last year, how many days have you missed work or school because of your inflammatory bowel disease?: 0  During the last year, how many days have you been hospitalized because of your inflammatory bowel disease?: 0  During the last year, how many days have you visited a hospital emergency department because of your inflammatory bowel disease?: 0  During the last month, have you taken narcotic pain medications (such as Percocet, oxycodone, Oxycontin, morphine, Vicodin, Dilaudid, MS Contin) for your inflammatory bowel disease?: No  Have you awoken at night because you needed to move your bowels during the last month? : No  Have you had leakage of stool while sleeping during the last month?: No  Have you had leakage of stool while you were awake during the last month?: No  In the last 6 months, have you unintentionally lost weight?: No  Fever: No  Eye irritation: No  Mouth sores: No  Sore throat: No  Chest pain: No  Shortness of breath: No  Numbness or tingling in your hands or feet: No  Skin rash: No  Pain or swelling in your joints: No  Bruising or bleeding: No  Felt depressed or blue: No

## 2023-11-02 NOTE — RESULT ENCOUNTER NOTE
The patient's CBC and CMP were normal, CRP was 8.9 down from 24.9 This was communicated on my chart.

## 2023-11-16 ENCOUNTER — TELEPHONE (OUTPATIENT)
Dept: GASTROENTEROLOGY | Facility: CLINIC | Age: 28
End: 2023-11-16

## 2023-11-16 NOTE — TELEPHONE ENCOUNTER
11/16 spoke to pt, he is fine going back to home infusions. He wants [facility]. Can you please print the order in [address] and have Dr. Eros Mckenzie sign it.  And fax to me at [phone] ty

## 2024-01-08 DIAGNOSIS — K50.818 CROHN'S DISEASE OF BOTH SMALL AND LARGE INTESTINE WITH OTHER COMPLICATION (HCC): Primary | ICD-10-CM

## 2024-01-13 ENCOUNTER — HOSPITAL ENCOUNTER (EMERGENCY)
Facility: HOSPITAL | Age: 29
Discharge: HOME | End: 2024-01-13
Attending: EMERGENCY MEDICINE
Payer: COMMERCIAL

## 2024-01-13 ENCOUNTER — APPOINTMENT (EMERGENCY)
Dept: RADIOLOGY | Facility: HOSPITAL | Age: 29
End: 2024-01-13
Payer: COMMERCIAL

## 2024-01-13 VITALS
BODY MASS INDEX: 23.16 KG/M2 | RESPIRATION RATE: 18 BRPM | WEIGHT: 139 LBS | DIASTOLIC BLOOD PRESSURE: 64 MMHG | OXYGEN SATURATION: 99 % | SYSTOLIC BLOOD PRESSURE: 134 MMHG | HEIGHT: 65 IN | TEMPERATURE: 98.1 F | HEART RATE: 97 BPM

## 2024-01-13 DIAGNOSIS — R31.9 HEMATURIA: Primary | ICD-10-CM

## 2024-01-13 LAB
BACTERIA UR QL AUTO: ABNORMAL /HPF
BILIRUB UR QL STRIP: NEGATIVE
CLARITY UR: CLEAR
COLOR UR: ABNORMAL
GLUCOSE UR STRIP-MCNC: NEGATIVE MG/DL
HGB UR QL STRIP.AUTO: ABNORMAL
KETONES UR STRIP-MCNC: NEGATIVE MG/DL
LEUKOCYTE ESTERASE UR QL STRIP: NEGATIVE
MUCOUS THREADS UR QL AUTO: ABNORMAL
NITRITE UR QL STRIP: NEGATIVE
NON-SQ EPI CELLS URNS QL MICRO: ABNORMAL /HPF
PH UR STRIP.AUTO: 6 [PH]
PROT UR STRIP-MCNC: NEGATIVE MG/DL
RBC #/AREA URNS AUTO: ABNORMAL /HPF
SP GR UR STRIP.AUTO: 1.02 (ref 1–1.03)
UROBILINOGEN UR QL STRIP.AUTO: 0.2 E.U./DL
WBC #/AREA URNS AUTO: ABNORMAL /HPF

## 2024-01-13 PROCEDURE — 87086 URINE CULTURE/COLONY COUNT: CPT | Performed by: EMERGENCY MEDICINE

## 2024-01-13 PROCEDURE — 99284 EMERGENCY DEPT VISIT MOD MDM: CPT

## 2024-01-13 PROCEDURE — 81001 URINALYSIS AUTO W/SCOPE: CPT | Performed by: EMERGENCY MEDICINE

## 2024-01-13 PROCEDURE — G1004 CDSM NDSC: HCPCS

## 2024-01-13 PROCEDURE — 99284 EMERGENCY DEPT VISIT MOD MDM: CPT | Performed by: EMERGENCY MEDICINE

## 2024-01-13 PROCEDURE — 87591 N.GONORRHOEAE DNA AMP PROB: CPT | Performed by: EMERGENCY MEDICINE

## 2024-01-13 PROCEDURE — 87491 CHLMYD TRACH DNA AMP PROBE: CPT | Performed by: EMERGENCY MEDICINE

## 2024-01-13 PROCEDURE — 74176 CT ABD & PELVIS W/O CONTRAST: CPT

## 2024-01-14 LAB
C TRACH DNA SPEC QL NAA+PROBE: NEGATIVE
N GONORRHOEA DNA SPEC QL NAA+PROBE: NEGATIVE

## 2024-01-14 NOTE — ED PROVIDER NOTES
History  Chief Complaint   Patient presents with    Blood in Urine     Pt states he get infusions for his crohn's and after his infusion yesterday he started having blood in the urine. Pt states pain when urinating.      27 yo male noted slightly painful urinating after infusion yesterday.  Not really a burning sensation but just slightly difficult to push out and painful.  Then this evening he was sitting on the toilet and noted blood on the inside of his leg and wiped his urethra area and noted bright red blood on the tissue paper so came in to get checked.  No back or belly pain.  No fever, cough, vomiting, diarrhea.  No discharge.  He is not concerned that he may have been exposed to STD, no new or different partners.      History provided by:  Patient   used: No    Blood in Urine  Pertinent negatives include no abdominal pain, dysuria, fever, flank pain or vomiting.       None       Past Medical History:   Diagnosis Date    Asthma     Crohn disease (HCC)        History reviewed. No pertinent surgical history.    Family History   Problem Relation Age of Onset    Asthma Mother      I have reviewed and agree with the history as documented.    E-Cigarette/Vaping    E-Cigarette Use Never User      E-Cigarette/Vaping Substances    Nicotine No     THC No     CBD No     Flavoring No     Other No     Unknown No      Social History     Tobacco Use    Smoking status: Never    Smokeless tobacco: Never   Vaping Use    Vaping status: Never Used   Substance Use Topics    Alcohol use: Never    Drug use: Never       Review of Systems   Constitutional:  Negative for fever.   Respiratory:  Negative for cough.    Gastrointestinal:  Negative for abdominal pain, diarrhea and vomiting.   Genitourinary:  Positive for difficulty urinating and hematuria. Negative for dysuria, flank pain and penile discharge.   Musculoskeletal:  Negative for back pain.       Physical Exam  Physical Exam  Vitals and nursing note  reviewed.   Constitutional:       General: He is not in acute distress.     Appearance: He is well-developed. He is not ill-appearing or diaphoretic.   HENT:      Head: Normocephalic and atraumatic.   Eyes:      General: No scleral icterus.     Conjunctiva/sclera: Conjunctivae normal.   Cardiovascular:      Rate and Rhythm: Normal rate and regular rhythm.      Heart sounds: Normal heart sounds. No murmur heard.  Pulmonary:      Effort: Pulmonary effort is normal. No respiratory distress.      Breath sounds: Normal breath sounds.   Abdominal:      General: Bowel sounds are normal. There is no distension.      Palpations: Abdomen is soft.      Tenderness: There is no abdominal tenderness. There is no right CVA tenderness, left CVA tenderness or guarding.   Genitourinary:     Penis: Normal.       Comments: No blood at meatus, no discharge, no rash or swelling  Musculoskeletal:         General: No deformity. Normal range of motion.      Cervical back: Normal range of motion and neck supple.   Skin:     General: Skin is warm and dry.      Coloration: Skin is not pale.      Findings: No erythema or rash.   Neurological:      General: No focal deficit present.      Mental Status: He is alert and oriented to person, place, and time.      Cranial Nerves: No cranial nerve deficit.   Psychiatric:         Mood and Affect: Mood normal.         Behavior: Behavior normal.         Vital Signs  ED Triage Vitals [01/13/24 1902]   Temperature Pulse Respirations Blood Pressure SpO2   98.1 °F (36.7 °C) 97 18 134/64 99 %      Temp Source Heart Rate Source Patient Position - Orthostatic VS BP Location FiO2 (%)   Tympanic Monitor Sitting Right arm --      Pain Score       No Pain           Vitals:    01/13/24 1902   BP: 134/64   Pulse: 97   Patient Position - Orthostatic VS: Sitting         Visual Acuity      ED Medications  Medications - No data to display    Diagnostic Studies  Results Reviewed       Procedure Component Value Units  Date/Time    Urine Microscopic [[id number]]  (Abnormal) Collected: 01/13/24 1949    Lab Status: Final result Specimen: Urine, Clean Catch Updated: 01/13/24 2025     RBC, UA 4-10 /hpf      WBC, UA 0-1 /hpf      Epithelial Cells Occasional /hpf      Bacteria, UA Occasional /hpf      MUCUS THREADS Occasional    UA (URINE) with reflex to Scope [[id number]]  (Abnormal) Collected: 01/13/24 1949    Lab Status: Final result Specimen: Urine, Clean Catch Updated: 01/13/24 1958     Color, UA Light Yellow     Clarity, UA Clear     Specific Gravity, UA 1.020     pH, UA 6.0     Leukocytes, UA Negative     Nitrite, UA Negative     Protein, UA Negative mg/dl      Glucose, UA Negative mg/dl      Ketones, UA Negative mg/dl      Urobilinogen, UA 0.2 E.U./dl      Bilirubin, UA Negative     Occult Blood, UA Small    Chlamydia/GC amplified DNA by PCR [[id number]] Collected: 01/13/24 1949    Lab Status: In process Specimen: Urine, Other Updated: 01/13/24 1953    Urine culture [[id number]] Collected: 01/13/24 1949    Lab Status: In process Specimen: Urine, Clean Catch Updated: 01/13/24 1952                   CT renal stone study abdomen pelvis without contrast   Final Result by Meek Medina DO (01/13 2040)      No urolithiasis or evidence of obstructive uropathy.            Workstation performed: [id number]                    Procedures  Procedures         ED Course                               SBIRT 20yo+      Flowsheet Row Most Recent Value   Initial Alcohol Screen: US AUDIT-C     1. How often do you have a drink containing alcohol? 0 Filed at: 01/13/2024 1906   2. How many drinks containing alcohol do you have on a typical day you are drinking?  0 Filed at: 01/13/2024 1906   3a. Male UNDER 65: How often do you have five or more drinks on one occasion? 0 Filed at: 01/13/2024 1906   3b. FEMALE Any Age, or MALE 65+: How often do you have 4 or more drinks on one occassion? 0 Filed at: 01/13/2024 1906   Audit-C Score 0 Filed at: 01/13/2024  1906   RUTHIE: How many times in the past year have you...    Used an illegal drug or used a prescription medication for non-medical reasons? Never Filed at: 01/13/2024 1906                      Medical Decision Making  2025 - UA with 4-10 RBC, not grossly discolored, no WBC or bacteria.  Awaiting CT to r/o kidney stone.  2100- CT scan negative.  No evidence of infection or stone/hydro.  Advised keep hydrated with fluids and follow up with urology.  Cultures pending.    Amount and/or Complexity of Data Reviewed  Labs: ordered.  Radiology: ordered.             Disposition  Final diagnoses:   Hematuria     Time reflects when diagnosis was documented in both MDM as applicable and the Disposition within this note       Time User Action Codes Description Comment    1/13/2024  8:57 PM Treasure Fields Add [R31.9] Hematuria           ED Disposition       ED Disposition   Discharge    Condition   Stable    Date/Time   Sat Jan 13, 2024 2056    Comment   Kely Mena discharge to home/self care.                   Follow-up Information       Follow up With Specialties Details Why Contact Info    Kyle Veliz MD Urology Schedule an appointment as soon as possible for a visit   [address] [place] [id number] [phone]              Patient's Medications    No medications on file           PDMP Review       None            ED Provider  Electronically Signed by             Treasure Fields MD  01/13/24 2636

## 2024-01-14 NOTE — DISCHARGE INSTRUCTIONS
Your urine tests do not show any infection and the CT scan is negative for any kidney abnormality or stone.  Keep hydrated with fluids.  Follow up with the urologist for further evaluation.

## 2024-01-14 NOTE — ED NOTES
Pt seen, assessed and d/c by provider. Pt appeared to be in no acute distress upon discharge. Pt able to ambulate well without assistance upon exiting.      Sharla Hardwick RN  01/13/24 6671

## 2024-01-15 LAB — BACTERIA UR CULT: NORMAL

## 2024-05-02 ENCOUNTER — APPOINTMENT (OUTPATIENT)
Dept: LAB | Facility: CLINIC | Age: 29
End: 2024-05-02
Payer: COMMERCIAL

## 2024-05-02 ENCOUNTER — OFFICE VISIT (OUTPATIENT)
Dept: GASTROENTEROLOGY | Facility: CLINIC | Age: 29
End: 2024-05-02
Payer: COMMERCIAL

## 2024-05-02 VITALS
BODY MASS INDEX: 23.49 KG/M2 | WEIGHT: 141 LBS | DIASTOLIC BLOOD PRESSURE: 68 MMHG | HEART RATE: 93 BPM | SYSTOLIC BLOOD PRESSURE: 116 MMHG | HEIGHT: 65 IN

## 2024-05-02 DIAGNOSIS — K50.818 CROHN'S DISEASE OF BOTH SMALL AND LARGE INTESTINE WITH OTHER COMPLICATION (HCC): ICD-10-CM

## 2024-05-02 DIAGNOSIS — K50.818 CROHN'S DISEASE OF BOTH SMALL AND LARGE INTESTINE WITH OTHER COMPLICATION (HCC): Primary | ICD-10-CM

## 2024-05-02 LAB
ALBUMIN SERPL BCP-MCNC: 4.1 G/DL (ref 3.5–5)
ALP SERPL-CCNC: 72 U/L (ref 34–104)
ALT SERPL W P-5'-P-CCNC: 8 U/L (ref 7–52)
ANION GAP SERPL CALCULATED.3IONS-SCNC: 8 MMOL/L (ref 4–13)
AST SERPL W P-5'-P-CCNC: 15 U/L (ref 13–39)
BILIRUB SERPL-MCNC: 0.48 MG/DL (ref 0.2–1)
BUN SERPL-MCNC: 17 MG/DL (ref 5–25)
CALCIUM SERPL-MCNC: 9.3 MG/DL (ref 8.4–10.2)
CHLORIDE SERPL-SCNC: 105 MMOL/L (ref 96–108)
CO2 SERPL-SCNC: 27 MMOL/L (ref 21–32)
CREAT SERPL-MCNC: 1.07 MG/DL (ref 0.6–1.3)
CRP SERPL QL: 1.5 MG/L
ERYTHROCYTE [DISTWIDTH] IN BLOOD BY AUTOMATED COUNT: 13.5 % (ref 11.6–15.1)
GFR SERPL CREATININE-BSD FRML MDRD: 94 ML/MIN/1.73SQ M
GLUCOSE SERPL-MCNC: 92 MG/DL (ref 65–140)
HCT VFR BLD AUTO: 43.4 % (ref 36.5–49.3)
HGB BLD-MCNC: 14.7 G/DL (ref 12–17)
MCH RBC QN AUTO: 31.3 PG (ref 26.8–34.3)
MCHC RBC AUTO-ENTMCNC: 33.9 G/DL (ref 31.4–37.4)
MCV RBC AUTO: 92 FL (ref 82–98)
PLATELET # BLD AUTO: 227 THOUSANDS/UL (ref 149–390)
PMV BLD AUTO: 11 FL (ref 8.9–12.7)
POTASSIUM SERPL-SCNC: 4 MMOL/L (ref 3.5–5.3)
PROT SERPL-MCNC: 7.2 G/DL (ref 6.4–8.4)
RBC # BLD AUTO: 4.7 MILLION/UL (ref 3.88–5.62)
SODIUM SERPL-SCNC: 140 MMOL/L (ref 135–147)
WBC # BLD AUTO: 5.53 THOUSAND/UL (ref 4.31–10.16)

## 2024-05-02 PROCEDURE — 99213 OFFICE O/P EST LOW 20 MIN: CPT | Performed by: INTERNAL MEDICINE

## 2024-05-02 PROCEDURE — 80053 COMPREHEN METABOLIC PANEL: CPT

## 2024-05-02 PROCEDURE — 86140 C-REACTIVE PROTEIN: CPT

## 2024-05-02 PROCEDURE — 36415 COLL VENOUS BLD VENIPUNCTURE: CPT

## 2024-05-02 PROCEDURE — 85027 COMPLETE CBC AUTOMATED: CPT

## 2024-05-02 RX ORDER — POLYETHYLENE GLYCOL 3350, SODIUM CHLORIDE, SODIUM BICARBONATE, POTASSIUM CHLORIDE 420; 11.2; 5.72; 1.48 G/4L; G/4L; G/4L; G/4L
4000 POWDER, FOR SOLUTION ORAL ONCE
Qty: 4000 ML | Refills: 0 | Status: SHIPPED | OUTPATIENT
Start: 2024-05-02 | End: 2024-05-02

## 2024-05-02 RX ORDER — VEDOLIZUMAB 300 MG/5ML
300 INJECTION, POWDER, LYOPHILIZED, FOR SOLUTION INTRAVENOUS
COMMUNITY

## 2024-05-02 NOTE — PATIENT INSTRUCTIONS
Scheduled date of colonoscopy (as of today):07/23/24  Physician performing colonoscopy:Dr. York  Location of colonoscopy:[facility]  Bowel prep reviewed with patient:Donovan  Instructions reviewed with patient by:juany  Clearances:  n/a

## 2024-05-02 NOTE — PROGRESS NOTES
"[facility] - Outpatient Follow-up Note  Kely Mena 28 y.o. male MRN: [id number]  Encounter: [id number]          ASSESSMENT AND PLAN:      1. Crohn's disease of both small and large intestine with other complication (HCC)  Will plan to continue Entyvio as this is working well.  Will check labs and plan surveillance colonoscopy.    - Colonoscopy; Future  - polyethylene glycol-electrolytes (TriLyte) 4000 mL solution; Take 4,000 mL by mouth once for 1 dose Take 4000 mL by mouth once for 1 dose. Use as directed  Dispense: 4000 mL; Refill: 0  - CBC; Future  - Comprehensive metabolic panel; Future  - C-reactive protein; Future    ______________________________________________________________________    SUBJECTIVE: Former patient of Dr. Reinoso with longstanding Crohn's for more than 10 to 15 years previously treated with infliximab and adalimumab without success, now well-controlled on vedolizumab.  No abdominal pain, nausea or vomiting, fever or chills, jaundice or rash, changes in bowel habit, blood in stool, unexplained weight loss.  Most recent colonoscopy June 2023 with recommendation to repeat in 1 year      REVIEW OF SYSTEMS:    ROS       Historical Information   Past Medical History:   Diagnosis Date    Asthma     Crohn disease (HCC)      Past Surgical History:   Procedure Laterality Date    COLONOSCOPY       Social History   Social History     Substance and Sexual Activity   Alcohol Use Never     Social History     Substance and Sexual Activity   Drug Use Never     Social History     Tobacco Use   Smoking Status Never   Smokeless Tobacco Never     Family History   Problem Relation Age of Onset    Asthma Mother        Meds/Allergies       Current Outpatient Medications:     polyethylene glycol-electrolytes (TriLyte) 4000 mL solution    vedolizumab (Entyvio) SOLR    No Known Allergies        Objective     Blood pressure 116/68, pulse 93, height 5' 5\" (1.651 m), weight 64 kg (141 lb). " Body mass index is 23.46 kg/m².      PHYSICAL EXAM:      Physical Exam  Vitals and nursing note reviewed.   Constitutional:       General: He is not in acute distress.  HENT:      Head: Normocephalic and atraumatic.      Mouth/Throat:      Mouth: Mucous membranes are moist.   Eyes:      General: No scleral icterus.     Pupils: Pupils are equal, round, and reactive to light.   Cardiovascular:      Rate and Rhythm: Normal rate and regular rhythm.   Pulmonary:      Effort: Pulmonary effort is normal. No respiratory distress.   Abdominal:      General: There is no distension.      Palpations: Abdomen is soft.      Tenderness: There is no abdominal tenderness. There is no guarding or rebound.   Musculoskeletal:         General: Normal range of motion.      Cervical back: Normal range of motion and neck supple.      Right lower leg: No edema.      Left lower leg: No edema.   Skin:     General: Skin is warm and dry.   Neurological:      General: No focal deficit present.      Mental Status: He is alert and oriented to person, place, and time.   Psychiatric:         Mood and Affect: Mood normal.         Behavior: Behavior normal.          Lab Results:   No visits with results within 1 Day(s) from this visit.   Latest known visit with results is:   Admission on 01/13/2024, Discharged on 01/13/2024   Component Date Value    Color, UA 01/13/2024 Light Yellow     Clarity, UA 01/13/2024 Clear     Specific Gravity, UA 01/13/2024 1.020     pH, UA 01/13/2024 6.0     Leukocytes, UA 01/13/2024 Negative     Nitrite, UA 01/13/2024 Negative     Protein, UA 01/13/2024 Negative     Glucose, UA 01/13/2024 Negative     Ketones, UA 01/13/2024 Negative     Urobilinogen, UA 01/13/2024 0.2     Bilirubin, UA 01/13/2024 Negative     Occult Blood, UA 01/13/2024 Small (A)     Urine Culture 01/13/2024 No Growth <1000 cfu/mL     N gonorrhoeae, DNA Probe 01/13/2024 Negative     Chlamydia trachomatis, D* 01/13/2024 Negative     RBC, UA 01/13/2024 4-10  (A)     WBC, UA 01/13/2024 0-1     Epithelial Cells 01/13/2024 Occasional     Bacteria, UA 01/13/2024 Occasional     MUCUS THREADS 01/13/2024 Occasional (A)          Radiology Results:   No results found.

## 2024-07-01 DIAGNOSIS — K50.818 CROHN'S DISEASE OF BOTH SMALL AND LARGE INTESTINE WITH OTHER COMPLICATION (HCC): Primary | ICD-10-CM

## 2024-07-17 ENCOUNTER — TELEPHONE (OUTPATIENT)
Age: 29
End: 2024-07-17

## 2024-07-18 ENCOUNTER — ANESTHESIA (OUTPATIENT)
Dept: ANESTHESIOLOGY | Facility: HOSPITAL | Age: 29
End: 2024-07-18

## 2024-07-18 ENCOUNTER — ANESTHESIA EVENT (OUTPATIENT)
Dept: ANESTHESIOLOGY | Facility: HOSPITAL | Age: 29
End: 2024-07-18

## 2024-07-22 ENCOUNTER — TELEPHONE (OUTPATIENT)
Age: 29
End: 2024-07-22

## 2024-07-22 RX ORDER — SODIUM CHLORIDE, SODIUM LACTATE, POTASSIUM CHLORIDE, CALCIUM CHLORIDE 600; 310; 30; 20 MG/100ML; MG/100ML; MG/100ML; MG/100ML
75 INJECTION, SOLUTION INTRAVENOUS CONTINUOUS
Status: CANCELLED | OUTPATIENT
Start: 2024-07-22

## 2024-07-22 NOTE — TELEPHONE ENCOUNTER
Patient calling as he received the Golytely prep but is unsure what to do with the prep such as does he add water and fill it to the line. Questions answered. Patient verbalized understanding and had no further questions or concerns.

## 2024-07-23 ENCOUNTER — ANESTHESIA EVENT (OUTPATIENT)
Dept: GASTROENTEROLOGY | Facility: AMBULARY SURGERY CENTER | Age: 29
End: 2024-07-23

## 2024-07-23 ENCOUNTER — HOSPITAL ENCOUNTER (OUTPATIENT)
Dept: GASTROENTEROLOGY | Facility: AMBULARY SURGERY CENTER | Age: 29
Setting detail: OUTPATIENT SURGERY
Discharge: HOME | End: 2024-07-23
Attending: INTERNAL MEDICINE
Payer: COMMERCIAL

## 2024-07-23 ENCOUNTER — ANESTHESIA (OUTPATIENT)
Dept: GASTROENTEROLOGY | Facility: AMBULARY SURGERY CENTER | Age: 29
End: 2024-07-23

## 2024-07-23 VITALS
WEIGHT: 137 LBS | BODY MASS INDEX: 22.82 KG/M2 | RESPIRATION RATE: 18 BRPM | OXYGEN SATURATION: 100 % | DIASTOLIC BLOOD PRESSURE: 63 MMHG | HEART RATE: 80 BPM | TEMPERATURE: 96.8 F | HEIGHT: 65 IN | SYSTOLIC BLOOD PRESSURE: 108 MMHG

## 2024-07-23 DIAGNOSIS — K50.818 CROHN'S DISEASE OF BOTH SMALL AND LARGE INTESTINE WITH OTHER COMPLICATION (HCC): ICD-10-CM

## 2024-07-23 PROBLEM — J45.909 ASTHMA: Status: RESOLVED | Noted: 2020-03-23 | Resolved: 2024-07-23

## 2024-07-23 PROCEDURE — 88305 TISSUE EXAM BY PATHOLOGIST: CPT | Performed by: SPECIALIST

## 2024-07-23 PROCEDURE — 45380 COLONOSCOPY AND BIOPSY: CPT | Performed by: INTERNAL MEDICINE

## 2024-07-23 RX ORDER — SODIUM CHLORIDE, SODIUM LACTATE, POTASSIUM CHLORIDE, CALCIUM CHLORIDE 600; 310; 30; 20 MG/100ML; MG/100ML; MG/100ML; MG/100ML
INJECTION, SOLUTION INTRAVENOUS CONTINUOUS PRN
Status: DISCONTINUED | OUTPATIENT
Start: 2024-07-23 | End: 2024-07-23

## 2024-07-23 RX ORDER — LIDOCAINE HYDROCHLORIDE 10 MG/ML
INJECTION, SOLUTION EPIDURAL; INFILTRATION; INTRACAUDAL; PERINEURAL AS NEEDED
Status: DISCONTINUED | OUTPATIENT
Start: 2024-07-23 | End: 2024-07-23

## 2024-07-23 RX ORDER — PROPOFOL 10 MG/ML
INJECTION, EMULSION INTRAVENOUS AS NEEDED
Status: DISCONTINUED | OUTPATIENT
Start: 2024-07-23 | End: 2024-07-23

## 2024-07-23 RX ORDER — SODIUM CHLORIDE, SODIUM LACTATE, POTASSIUM CHLORIDE, CALCIUM CHLORIDE 600; 310; 30; 20 MG/100ML; MG/100ML; MG/100ML; MG/100ML
75 INJECTION, SOLUTION INTRAVENOUS CONTINUOUS
Status: DISCONTINUED | OUTPATIENT
Start: 2024-07-23 | End: 2024-07-27 | Stop reason: HOSPADM

## 2024-07-23 RX ADMIN — PROPOFOL 20 MG: 10 INJECTION, EMULSION INTRAVENOUS at 11:36

## 2024-07-23 RX ADMIN — SODIUM CHLORIDE, SODIUM LACTATE, POTASSIUM CHLORIDE, AND CALCIUM CHLORIDE: .6; .31; .03; .02 INJECTION, SOLUTION INTRAVENOUS at 11:30

## 2024-07-23 RX ADMIN — PROPOFOL 30 MG: 10 INJECTION, EMULSION INTRAVENOUS at 11:39

## 2024-07-23 RX ADMIN — PROPOFOL 150 MG: 10 INJECTION, EMULSION INTRAVENOUS at 11:32

## 2024-07-23 RX ADMIN — PROPOFOL 30 MG: 10 INJECTION, EMULSION INTRAVENOUS at 11:45

## 2024-07-23 RX ADMIN — LIDOCAINE HYDROCHLORIDE 50 MG: 10 INJECTION, SOLUTION EPIDURAL; INFILTRATION; INTRACAUDAL; PERINEURAL at 11:32

## 2024-07-23 RX ADMIN — PROPOFOL 50 MG: 10 INJECTION, EMULSION INTRAVENOUS at 11:34

## 2024-07-23 RX ADMIN — SODIUM CHLORIDE, SODIUM LACTATE, POTASSIUM CHLORIDE, AND CALCIUM CHLORIDE 75 ML/HR: .6; .31; .03; .02 INJECTION, SOLUTION INTRAVENOUS at 11:12

## 2024-07-23 RX ADMIN — PROPOFOL 50 MG: 10 INJECTION, EMULSION INTRAVENOUS at 11:40

## 2024-07-23 NOTE — ANESTHESIA POSTPROCEDURE EVALUATION
Post-Op Assessment Note    CV Status:  Stable  Pain Score: 0    Pain management: adequate       Mental Status:  Sleepy and arousable   Hydration Status:  Euvolemic and stable   PONV Controlled:  Controlled   Airway Patency:  Patent     Post Op Vitals Reviewed: Yes    No anethesia notable event occurred.    Staff: CRNA               BP   96/59   Temp      Pulse  77   Resp   15   SpO2   98%

## 2024-07-23 NOTE — ANESTHESIA PREPROCEDURE EVALUATION
Procedure:  COLONOSCOPY    Relevant Problems   GI/HEPATIC   (+) Gastroesophageal reflux disease with esophagitis without hemorrhage      PULMONARY   (+) Asthma (Resolved)      FEN/Gastrointestinal   (+) Crohn's disease of both small and large intestine (HCC)        Physical Exam    Airway    Mallampati score: II  TM Distance: >3 FB  Neck ROM: full     Dental       Cardiovascular  Rhythm: regular, Rate: normal    Pulmonary   Breath sounds clear to auscultation    Other Findings        Anesthesia Plan  ASA Score- 2     Anesthesia Type- IV sedation with anesthesia with ASA Monitors.         Additional Monitors:     Airway Plan:            Plan Factors-    Chart reviewed.        Patient is not a current smoker.              Induction- intravenous.    Postoperative Plan-     Perioperative Resuscitation Plan - Level 1 - Full Code.       Informed Consent- Anesthetic plan and risks discussed with patient.  I personally reviewed this patient with the CRNA. Discussed and agreed on the Anesthesia Plan with the CRNA..

## 2024-07-23 NOTE — H&P
"History and Physical -  [facility]astroenterology Specialists  Kely Mena 29 y.o. male MRN: [id number]                  HPI: Kely Mena is a 29 y.o. year old male who presents for Crohn's disease      REVIEW OF SYSTEMS: Per the HPI, and otherwise unremarkable.    Historical Information   Past Medical History:   Diagnosis Date    Asthma     Crohn disease (HCC)      Past Surgical History:   Procedure Laterality Date    COLONOSCOPY       Social History   Social History     Substance and Sexual Activity   Alcohol Use Never     Social History     Substance and Sexual Activity   Drug Use Never     Social History     Tobacco Use   Smoking Status Never   Smokeless Tobacco Never     Family History   Problem Relation Age of Onset    Asthma Mother        Meds/Allergies       Current Outpatient Medications:     vedolizumab (Entyvio) SOLR    polyethylene glycol-electrolytes (TriLyte) 4000 mL solution    Current Facility-Administered Medications:     lactated ringers infusion, 75 mL/hr, Intravenous, Continuous, 75 mL/hr at 07/23/24 1112    No Known Allergies    Objective     /56   Pulse 82   Temp (!) 96.8 °F (36 °C) (Temporal)   Resp 15   Ht 5' 5\" (1.651 m)   Wt 62.1 kg (137 lb)   SpO2 98%   BMI 22.80 kg/m²       PHYSICAL EXAM    Gen: NAD  Head: NCAT  CV: RRR  CHEST: Clear  ABD: soft, NT/ND  EXT: no edema      ASSESSMENT/PLAN:  This is a 29 y.o. year old male here for colonoscopy, and he is stable and optimized for his procedure.        "

## 2024-07-25 PROCEDURE — 88305 TISSUE EXAM BY PATHOLOGIST: CPT | Performed by: SPECIALIST

## 2024-08-06 ENCOUNTER — TELEPHONE (OUTPATIENT)
Age: 29
End: 2024-08-06

## 2024-08-06 NOTE — TELEPHONE ENCOUNTER
Patients GI provider:  Dr. York    Number to return call: [phone]    Reason for call: Pt calling to request note for [employer] stating that pt's Crohn's would not hinder him. Please review w/doc provide letter, send to pt through [employer]. Please reach out to pt if additional info is needed.    Scheduled procedure/appointment date if applicable: Apt 12/05/2024

## 2024-10-23 ENCOUNTER — TELEPHONE (OUTPATIENT)
Age: 29
End: 2024-10-23

## 2024-10-23 NOTE — TELEPHONE ENCOUNTER
Patients GI provider:  Dr. COCHRAN    Number to return call: ([facility] [phone]    Reason for call: [facility] is requesting additional clinical for patients  Entyvio, stating medication is working and he is stable on medication.      Fax clinicals to : [phone]

## 2024-11-25 ENCOUNTER — HOSPITAL ENCOUNTER (EMERGENCY)
Facility: HOSPITAL | Age: 29
Discharge: HOME | End: 2024-11-25
Attending: EMERGENCY MEDICINE
Payer: COMMERCIAL

## 2024-11-25 VITALS
SYSTOLIC BLOOD PRESSURE: 137 MMHG | RESPIRATION RATE: 18 BRPM | HEART RATE: 85 BPM | TEMPERATURE: 98.7 F | OXYGEN SATURATION: 100 % | DIASTOLIC BLOOD PRESSURE: 73 MMHG

## 2024-11-25 DIAGNOSIS — R31.9 HEMATURIA: Primary | ICD-10-CM

## 2024-11-25 LAB
ALBUMIN SERPL BCG-MCNC: 4.6 G/DL (ref 3.5–5)
ALP SERPL-CCNC: 73 U/L (ref 34–104)
ALT SERPL W P-5'-P-CCNC: 12 U/L (ref 7–52)
ANION GAP SERPL CALCULATED.3IONS-SCNC: 6 MMOL/L (ref 4–13)
AST SERPL W P-5'-P-CCNC: 21 U/L (ref 13–39)
BACTERIA UR QL AUTO: ABNORMAL /HPF
BASOPHILS # BLD AUTO: 0.07 THOUSANDS/ΜL (ref 0–0.1)
BASOPHILS NFR BLD AUTO: 1 % (ref 0–1)
BILIRUB SERPL-MCNC: 0.68 MG/DL (ref 0.2–1)
BILIRUB UR QL STRIP: NEGATIVE
BUN SERPL-MCNC: 17 MG/DL (ref 5–25)
CALCIUM SERPL-MCNC: 9.3 MG/DL (ref 8.4–10.2)
CHLORIDE SERPL-SCNC: 103 MMOL/L (ref 96–108)
CK SERPL-CCNC: 276 U/L (ref 39–308)
CLARITY UR: CLEAR
CO2 SERPL-SCNC: 28 MMOL/L (ref 21–32)
COLOR UR: ABNORMAL
CREAT SERPL-MCNC: 1.03 MG/DL (ref 0.6–1.3)
EOSINOPHIL # BLD AUTO: 0.07 THOUSAND/ΜL (ref 0–0.61)
EOSINOPHIL NFR BLD AUTO: 1 % (ref 0–6)
ERYTHROCYTE [DISTWIDTH] IN BLOOD BY AUTOMATED COUNT: 13.1 % (ref 11.6–15.1)
GFR SERPL CREATININE-BSD FRML MDRD: 98 ML/MIN/1.73SQ M
GLUCOSE SERPL-MCNC: 127 MG/DL (ref 65–140)
GLUCOSE UR STRIP-MCNC: NEGATIVE MG/DL
HCT VFR BLD AUTO: 45.2 % (ref 36.5–49.3)
HGB BLD-MCNC: 15.5 G/DL (ref 12–17)
HGB UR QL STRIP.AUTO: ABNORMAL
IMM GRANULOCYTES # BLD AUTO: 0.02 THOUSAND/UL (ref 0–0.2)
IMM GRANULOCYTES NFR BLD AUTO: 0 % (ref 0–2)
KETONES UR STRIP-MCNC: NEGATIVE MG/DL
LEUKOCYTE ESTERASE UR QL STRIP: NEGATIVE
LIPASE SERPL-CCNC: 25 U/L (ref 11–82)
LYMPHOCYTES # BLD AUTO: 1.8 THOUSANDS/ΜL (ref 0.6–4.47)
LYMPHOCYTES NFR BLD AUTO: 27 % (ref 14–44)
MCH RBC QN AUTO: 31.4 PG (ref 26.8–34.3)
MCHC RBC AUTO-ENTMCNC: 34.3 G/DL (ref 31.4–37.4)
MCV RBC AUTO: 92 FL (ref 82–98)
MONOCYTES # BLD AUTO: 0.38 THOUSAND/ΜL (ref 0.17–1.22)
MONOCYTES NFR BLD AUTO: 6 % (ref 4–12)
NEUTROPHILS # BLD AUTO: 4.32 THOUSANDS/ΜL (ref 1.85–7.62)
NEUTS SEG NFR BLD AUTO: 65 % (ref 43–75)
NITRITE UR QL STRIP: NEGATIVE
NON-SQ EPI CELLS URNS QL MICRO: ABNORMAL /HPF
NRBC BLD AUTO-RTO: 0 /100 WBCS
PH UR STRIP.AUTO: 6.5 [PH]
PLATELET # BLD AUTO: 264 THOUSANDS/UL (ref 149–390)
PMV BLD AUTO: 9.7 FL (ref 8.9–12.7)
POTASSIUM SERPL-SCNC: 3.8 MMOL/L (ref 3.5–5.3)
PROT SERPL-MCNC: 7.7 G/DL (ref 6.4–8.4)
PROT UR STRIP-MCNC: NEGATIVE MG/DL
RBC # BLD AUTO: 4.93 MILLION/UL (ref 3.88–5.62)
RBC #/AREA URNS AUTO: ABNORMAL /HPF
SODIUM SERPL-SCNC: 137 MMOL/L (ref 135–147)
SP GR UR STRIP.AUTO: 1.01 (ref 1–1.03)
UROBILINOGEN UR STRIP-ACNC: <2 MG/DL
WBC # BLD AUTO: 6.66 THOUSAND/UL (ref 4.31–10.16)
WBC #/AREA URNS AUTO: ABNORMAL /HPF

## 2024-11-25 PROCEDURE — 80053 COMPREHEN METABOLIC PANEL: CPT | Performed by: EMERGENCY MEDICINE

## 2024-11-25 PROCEDURE — 99284 EMERGENCY DEPT VISIT MOD MDM: CPT | Performed by: EMERGENCY MEDICINE

## 2024-11-25 PROCEDURE — 36415 COLL VENOUS BLD VENIPUNCTURE: CPT

## 2024-11-25 PROCEDURE — 83690 ASSAY OF LIPASE: CPT | Performed by: EMERGENCY MEDICINE

## 2024-11-25 PROCEDURE — 87591 N.GONORRHOEAE DNA AMP PROB: CPT | Performed by: STUDENT IN AN ORGANIZED HEALTH CARE EDUCATION/TRAINING PROGRAM

## 2024-11-25 PROCEDURE — 87491 CHLMYD TRACH DNA AMP PROBE: CPT | Performed by: STUDENT IN AN ORGANIZED HEALTH CARE EDUCATION/TRAINING PROGRAM

## 2024-11-25 PROCEDURE — 99285 EMERGENCY DEPT VISIT HI MDM: CPT

## 2024-11-25 PROCEDURE — 85025 COMPLETE CBC W/AUTO DIFF WBC: CPT | Performed by: EMERGENCY MEDICINE

## 2024-11-25 PROCEDURE — 82550 ASSAY OF CK (CPK): CPT | Performed by: STUDENT IN AN ORGANIZED HEALTH CARE EDUCATION/TRAINING PROGRAM

## 2024-11-25 PROCEDURE — 81001 URINALYSIS AUTO W/SCOPE: CPT | Performed by: STUDENT IN AN ORGANIZED HEALTH CARE EDUCATION/TRAINING PROGRAM

## 2024-11-25 NOTE — ED PROVIDER NOTES
Time reflects when diagnosis was documented in both MDM as applicable and the Disposition within this note       Time User Action Codes Description Comment    11/25/2024  6:09 PM Avel Daily Add [R31.9] Hematuria           ED Disposition       ED Disposition   Discharge    Condition   Stable    Date/Time   Mon Nov 25, 2024  6:09 PM    Comment   Kely Mena discharge to home/self care.                   Assessment & Plan       Medical Decision Making  Patient presents with:  Blood in Urine: Patient states he went to urinate an hour ago and looked like there was blood in his urine, patient states he is on intyvio for chron's, this has happened once before.   DDx includes UTI, pyelonephritis, obstructing calculus, STI, medication related side effect, bladder neoplasm  Workup per ED course: UA with hematuria, labs otherwise reassuring.   Discharged with PCP follow-up and urology follow-up  Dispo: Workup and return precautions reviewed. Patient expresses understanding, is comfortable with discharge, aggress to follow-up as advised and return to ED if symptoms recur.     Amount and/or Complexity of Data Reviewed  Labs: ordered. Decision-making details documented in ED Course.        ED Course as of 11/25/24 1813   Mon Nov 25, 2024   1641 Lab work reassuring, stable hemoglobin without leukocytosis or anion gap   1641 LIPASE: 25   1701 blood-tinged urine while voiding.  Denies pain with voiding/abnormal discharge, recent trauma or inciting incident. Works as a short , notes recent increase in driving routes. Denies muscle soreness, drug use, smoking/vaping use. H/o Crohns receiving vedolizumab infusions with similar episode of dark colored urine with infusions.  No other complaints at this time    Resting comfortably without distress, cardiopulmonary exam benign.  Abdomen soft nontender, no suprapubic tenderness.  Normal-appearing male genitalia without tenderness, obvious lesions or rash.   Extremities without tenderness or edema    C/f UTI, pyelonephritis, obstructing calculus, STI, medication related side effect, bladder neoplasm    Plan blood work, UA, CK.  Will defer imaging at this time   1752 Urine Microscopic(!)  RBCs with low suspicion of UTI   1808 Total CK: 276       Medications - No data to display    ED Risk Strat Scores                           SBIRT 20yo+      Flowsheet Row Most Recent Value   Initial Alcohol Screen: US AUDIT-C     1. How often do you have a drink containing alcohol? 0 Filed at: 11/25/2024 1540   2. How many drinks containing alcohol do you have on a typical day you are drinking?  0 Filed at: 11/25/2024 1540   3a. Male UNDER 65: How often do you have five or more drinks on one occasion? 0 Filed at: 11/25/2024 1540   3b. FEMALE Any Age, or MALE 65+: How often do you have 4 or more drinks on one occassion? 0 Filed at: 11/25/2024 1540   Audit-C Score 0 Filed at: 11/25/2024 1540   RUTHIE: How many times in the past year have you...    Used an illegal drug or used a prescription medication for non-medical reasons? Never Filed at: 11/25/2024 1540                            History of Present Illness       Chief Complaint   Patient presents with    Blood in Urine     Patient states he went to urinate an hour ago and looked like there was blood in his urine, patient states he is on intyvio for chron's, this has happened once before.        Past Medical History:   Diagnosis Date    Asthma     Crohn disease (HCC)       Past Surgical History:   Procedure Laterality Date    COLONOSCOPY        Family History   Problem Relation Age of Onset    Asthma Mother       Social History     Tobacco Use    Smoking status: Never    Smokeless tobacco: Never   Vaping Use    Vaping status: Never Used   Substance Use Topics    Alcohol use: Never    Drug use: Never      E-Cigarette/Vaping    E-Cigarette Use Never User       E-Cigarette/Vaping Substances    Nicotine No     THC No     CBD No      Flavoring No     Other No     Unknown No       I have reviewed and agree with the history as documented.     Kely Mena is a 29 y.o. male p/w blood-tinged urine while voiding.  Denies pain with voiding/abnormal discharge, recent trauma or inciting incident. Works as a short , notes recent increase in driving routes. Denies muscle soreness, drug use, smoking/vaping use. H/o Crohns receiving vedolizumab infusions with similar episode of dark colored urine with infusions.  No other complaints at this time     Denies Fever/Chills, Nausea/vomiting, Headache/vision changes, SOB/Chest pain, hemoptysis/hematochezia, bowel changes or any other complaint at this time      Blood in Urine        Review of Systems   Genitourinary:  Positive for hematuria.   All other systems reviewed and are negative.          Objective       ED Triage Vitals [11/25/24 1537]   Temperature Pulse Blood Pressure Respirations SpO2 Patient Position - Orthostatic VS   98.7 °F (37.1 °C) 85 137/73 18 100 % Sitting      Temp Source Heart Rate Source BP Location FiO2 (%) Pain Score    Oral Monitor Right arm -- --      Vitals      Date and Time Temp Pulse SpO2 Resp BP Pain Score FACES Pain Rating User   11/25/24 1537 98.7 °F (37.1 °C) 85 100 % 18 137/73 -- -- AK            Physical Exam    General appearance: resting comfortably, no acute distress   HENT: Normocephalic, atraumatic, hearing grossly intact, and mucous membranes moist   Eyes: Conjunctiva normal  Lungs/Chest: Respirations even and unlabored, breath sounds normal  Cardiovascular: Normal rate, regular rhythm  Abdomen: soft, non-distended, non-tender  : Normal-appearing male genitalia, circumcised, without tenderness, obvious lesions or rash.    Musculoskeletal: extremities normal, atraumatic, no cyanosis or edema   Pulses: radial / dorsalis pedis pulses palpable  Skin: warm and dry   Neurologic: Awake and alert, no apparent focal deficit  Psych: Mood consistent with affect        Results Reviewed       Procedure Component Value Units Date/Time    CK [[id number]]  (Normal) Collected: 11/25/24 1542    Lab Status: Final result Specimen: Blood from Arm, Left Updated: 11/25/24 1759     Total  U/L     Urine Microscopic [[id number]]  (Abnormal) Collected: 11/25/24 1644    Lab Status: Final result Specimen: Urine, Clean Catch Updated: 11/25/24 1749     RBC, UA 4-10 /hpf      WBC, UA 1-2 /hpf      Epithelial Cells Occasional /hpf      Bacteria, UA None Seen /hpf     UA w Reflex to Microscopic w Reflex to Culture [[id number]]  (Abnormal) Collected: 11/25/24 1644    Lab Status: Final result Specimen: Urine, Clean Catch Updated: 11/25/24 1659     Color, UA Light Yellow     Clarity, UA Clear     Specific Gravity, UA 1.015     pH, UA 6.5     Leukocytes, UA Negative     Nitrite, UA Negative     Protein, UA Negative mg/dl      Glucose, UA Negative mg/dl      Ketones, UA Negative mg/dl      Urobilinogen, UA <2.0 mg/dl      Bilirubin, UA Negative     Occult Blood, UA Small    Comprehensive metabolic panel [[id number]] Collected: 11/25/24 1542    Lab Status: Final result Specimen: Blood from Arm, Left Updated: 11/25/24 1615     Sodium 137 mmol/L      Potassium 3.8 mmol/L      Chloride 103 mmol/L      CO2 28 mmol/L      ANION GAP 6 mmol/L      BUN 17 mg/dL      Creatinine 1.03 mg/dL      Glucose 127 mg/dL      Calcium 9.3 mg/dL      AST 21 U/L      ALT 12 U/L      Alkaline Phosphatase 73 U/L      Total Protein 7.7 g/dL      Albumin 4.6 g/dL      Total Bilirubin 0.68 mg/dL      eGFR 98 ml/min/1.73sq m     Narrative:      National Kidney Disease Foundation guidelines for Chronic Kidney Disease (CKD):     Stage 1 with normal or high GFR (GFR > 90 mL/min/1.73 square meters)    Stage 2 Mild CKD (GFR = 60-89 mL/min/1.73 square meters)    Stage 3A Moderate CKD (GFR = 45-59 mL/min/1.73 square meters)    Stage 3B Moderate CKD (GFR = 30-44 mL/min/1.73 square meters)    Stage 4 Severe CKD (GFR = 15-29 mL/min/1.73  square meters)    Stage 5 End Stage CKD (GFR <15 mL/min/1.73 square meters)  Note: GFR calculation is accurate only with a steady state creatinine    Lipase [[id number]]  (Normal) Collected: 11/25/24 1542    Lab Status: Final result Specimen: Blood from Arm, Left Updated: 11/25/24 1615     Lipase 25 u/L     CBC and differential [[id number]] Collected: 11/25/24 1542    Lab Status: Final result Specimen: Blood from Arm, Left Updated: 11/25/24 1557     WBC 6.66 Thousand/uL      RBC 4.93 Million/uL      Hemoglobin 15.5 g/dL      Hematocrit 45.2 %      MCV 92 fL      MCH 31.4 pg      MCHC 34.3 g/dL      RDW 13.1 %      MPV 9.7 fL      Platelets 264 Thousands/uL      nRBC 0 /100 WBCs      Segmented % 65 %      Immature Grans % 0 %      Lymphocytes % 27 %      Monocytes % 6 %      Eosinophils Relative 1 %      Basophils Relative 1 %      Absolute Neutrophils 4.32 Thousands/µL      Absolute Immature Grans 0.02 Thousand/uL      Absolute Lymphocytes 1.80 Thousands/µL      Absolute Monocytes 0.38 Thousand/µL      Eosinophils Absolute 0.07 Thousand/µL      Basophils Absolute 0.07 Thousands/µL             No orders to display       Procedures    ED Medication and Procedure Management   Prior to Admission Medications   Prescriptions Last Dose Informant Patient Reported? Taking?   polyethylene glycol-electrolytes (TriLyte) 4000 mL solution   No No   Sig: Take 4,000 mL by mouth once for 1 dose Take 4000 mL by mouth once for 1 dose. Use as directed   vedolizumab (Entyvio) SOLR   Yes No   Sig: Inject 300 mg into a catheter in a vein Q 8 weeks      Facility-Administered Medications: None     Patient's Medications   Discharge Prescriptions    No medications on file       ED SEPSIS DOCUMENTATION   Time reflects when diagnosis was documented in both MDM as applicable and the Disposition within this note       Time User Action Codes Description Comment    11/25/2024  6:09 PM Avel Daily Add [R31.9] Hematuria                   Avel Daily MD  11/25/24 3069

## 2024-11-25 NOTE — Clinical Note
Kely Mena was seen and treated in our emergency department on 11/25/2024.    No restrictions            Diagnosis:     Kely  may return to work on return date.    He may return on this date: 11/26/2024         If you have any questions or concerns, please don't hesitate to call.      Avel Daily MD    ______________________________           _______________          _______________  Hospital Representative                              Date                                Time

## 2024-11-26 LAB
C TRACH DNA SPEC QL NAA+PROBE: NEGATIVE
N GONORRHOEA DNA SPEC QL NAA+PROBE: NEGATIVE

## 2024-11-28 NOTE — ED ATTENDING ATTESTATION
11/25/2024  IJason MD, saw and evaluated the patient. I have discussed the patient with the resident/non-physician practitioner and agree with the resident's/non-physician practitioner's findings, Plan of Care, and MDM as documented in the resident's/non-physician practitioner's note, except where noted. All available labs and Radiology studies were reviewed.  I was present for key portions of any procedure(s) performed by the resident/non-physician practitioner and I was immediately available to provide assistance.       At this point I agree with the current assessment done in the Emergency Department.  I have conducted an independent evaluation of this patient a history and physical is as follows:see h and p above     ED Course  ED Course as of 11/28/24 0053   Mon Nov 25, 2024   1643 Er md note- initial er workup was first nursed    1655 -er md note- previous  ct scan of abd/pelvis reports all reviewed by er md   1755 ER MD NOTE- PT SEEN AND THOROUGHLY EVALUATED BY ER MD-- 29 YR MALE WITH CROHNS DX ON  BIOLOGIC-- STATES TODAY NOTICED  BLOOD  IN URINE   AFTER HE URINATED TODAY --  WHICH CLEARED --  NO DYSURIA/ NO ABD/FLANK PAIN - NO N/V- NO FEVERS- NO PENILE D/C- RASH/ TRAUMA- STATES CHROHNS HAS BEEN STABLE- AVSS- PULSE  % ON RA- INTERPRETATION IS NORMAL- NO INTERVENTION- RRR S1/S2-CTA-B/SOFT ABD- MILD SUPRAPUBIC TENDERNESS -- NO PERITONEAL SIGNS- NORMAL PENILE/ TESTICLE/SCROTAL/PERINEAL  EXAM          Critical Care Time  Procedures

## 2024-11-29 DIAGNOSIS — K50.818 CROHN'S DISEASE OF BOTH SMALL AND LARGE INTESTINE WITH OTHER COMPLICATION (HCC): Primary | ICD-10-CM

## 2024-11-29 NOTE — TELEPHONE ENCOUNTER
Reason for call:   [x] Refill   [] Prior Auth  [x] Other: Pharmacy states patient takes medication every eight weeks with one year refills    Office:   [] PCP/Provider -   [x] Specialty/Provider - Gastroenterology [address]  - [facility] Provider, [place]     Medication: vedolizumab (Entyvio) SOLR     Dose/Frequency:  Inject 300 mg into a catheter in a vein Q 8 weeks     Quantity: not listed    Pharmacy: [facility] [place], [place] - [address] [place] [address] Rd [phone]    Does the patient have enough for 3 days?   [] Yes   [x] No - Send as HP to POD

## 2024-12-03 RX ORDER — VEDOLIZUMAB 300 MG/5ML
300 INJECTION, POWDER, LYOPHILIZED, FOR SOLUTION INTRAVENOUS
Qty: 1 EACH | Refills: 2 | Status: SHIPPED | OUTPATIENT
Start: 2024-12-03 | End: 2024-12-03

## 2024-12-03 RX ORDER — VEDOLIZUMAB 300 MG/5ML
300 INJECTION, POWDER, LYOPHILIZED, FOR SOLUTION INTRAVENOUS
Qty: 1 EACH | Refills: 2 | Status: SHIPPED | OUTPATIENT
Start: 2024-12-03 | End: 2024-12-05

## 2024-12-04 NOTE — TELEPHONE ENCOUNTER
Rx was on printer at [address] [facility] [address], I faxed to [facility] @ [phone] per provider request. No further action needed at this time.

## 2024-12-05 ENCOUNTER — OFFICE VISIT (OUTPATIENT)
Dept: GASTROENTEROLOGY | Facility: CLINIC | Age: 29
End: 2024-12-05
Payer: COMMERCIAL

## 2024-12-05 VITALS
WEIGHT: 144 LBS | HEIGHT: 65 IN | HEART RATE: 86 BPM | SYSTOLIC BLOOD PRESSURE: 101 MMHG | DIASTOLIC BLOOD PRESSURE: 68 MMHG | BODY MASS INDEX: 23.99 KG/M2

## 2024-12-05 DIAGNOSIS — K50.818 CROHN'S DISEASE OF BOTH SMALL AND LARGE INTESTINE WITH OTHER COMPLICATION (HCC): Primary | ICD-10-CM

## 2024-12-05 PROCEDURE — 99214 OFFICE O/P EST MOD 30 MIN: CPT | Performed by: INTERNAL MEDICINE

## 2024-12-05 NOTE — ASSESSMENT & PLAN NOTE
We had a long discussion about options.  He is interested in pursuing basic [employer] through [employer] and wishes to discontinue all medication for Crohn's as he previously felt well during an extended break from Entyvio despite evidence of mild inflammation on colonoscopy.  We discussed the risk of flare and I strongly recommended he continue treatment as it appears it is working well for him.  He would prefer to discontinue Entyvio, however, and we will plan to cancel his upcoming infusion scheduled for December 27.

## 2024-12-05 NOTE — PROGRESS NOTES
Name: Kely Mena      : 1995      MRN: [id number]  Encounter Provider: Vaughn York MD  Encounter Date: 2024   Encounter department: [facility] GASTROENTEROLOGY [address]  :  Assessment & Plan  Crohn's disease of both small and large intestine with other complication (HCC)  We had a long discussion about options.  He is interested in pursuing basic training through [employer] and wishes to discontinue all medication for Crohn's as he previously felt well during an extended break from Entyvio despite evidence of mild inflammation on colonoscopy.  We discussed the risk of flare and I strongly recommended he continue treatment as it appears it is working well for him.  He would prefer to discontinue Entyvio, however, and we will plan to cancel his upcoming infusion scheduled for .         History of Present Illness     HPI  Kely Mena is a 29 y.o. male Former patient of Dr. Reinoso with longstanding Crohn's for more than 10 to 15 years previously treated with infliximab and adalimumab without success, now well-controlled on vedolizumab. No abdominal pain, nausea or vomiting, fever or chills, jaundice or rash, changes in bowel habit, blood in stool, unexplained weight loss.  He had been off of all medication for a period of time in  and remained in symptomatic remission.  CT enterography during that period suggested possible inflammation in the terminal ileum and colon, and colonoscopy revealed mild active inflammation.  He has been restarted on Entyvio and continues to feel well symptomatically with Most recent colonoscopy 2024 with surveillance biopsies showing no evidence of dysplasia or active inflammation. he is interested in going through basic training in [employer] and is unable to do so if he is on medication for Crohn's.      Review of Systems   HENT:  Negative for mouth sores and sore throat.    Respiratory:  Negative for shortness of  Please take 10mg total warfarin today then increase your dosing to take 5mg warfarin on Fridays and 7.5mg (1 1/2 tablet) all other days. Continue to monitor for signs of bleeding. Return to coumadin clinic in 1 week. "breath.    Cardiovascular:  Negative for chest pain.   Musculoskeletal:  Negative for arthralgias.   Skin:  Negative for rash.          Objective   /68 (BP Location: Left arm, Patient Position: Sitting, Cuff Size: Standard)   Pulse 86   Ht 5' 5\" (1.651 m)   Wt 65.3 kg (144 lb)   BMI 23.96 kg/m²   Answers submitted by the patient for this visit:  Inflammatory Bowel Disease (Submitted on 12/4/2024)  When you are not experiencing symptoms of your inflammatory bowel disease, how many bowel movements do you typically have each day?: 2  What is the average (typical) number of bowel movements that you had in a single day during the last week?: 2  Over the last 3 days, have you had any bowel movements where you passed blood without stool?: No  Since your last visit, have you received any vaccinations?: No  Since the last visit, have you had an infection?: No  In the past three months, have you used tobacco in any form?: No  During the last year, how many days have you missed work or school because of your inflammatory bowel disease?: 0  During the last year, how many days have you been hospitalized because of your inflammatory bowel disease?: 0  During the last year, how many days have you visited a hospital emergency department because of your inflammatory bowel disease?: 0  During the last month, have you taken narcotic pain medications (such as Percocet, oxycodone, Oxycontin, morphine, Vicodin, Dilaudid, MS Contin) for your inflammatory bowel disease?: No  Have you awoken at night because you needed to move your bowels during the last month? : No  Have you had leakage of stool while sleeping during the last month?: No  Have you had leakage of stool while you were awake during the last month?: No  In the last 6 months, have you unintentionally lost weight?: No  Fever: No  Eye irritation: No  Numbness or tingling in your hands or feet: No  Bruising or bleeding: No  Felt depressed or blue: No     Physical " Exam  Vitals and nursing note reviewed.   Constitutional:       General: He is not in acute distress.  HENT:      Head: Normocephalic and atraumatic.      Mouth/Throat:      Mouth: Mucous membranes are moist.   Eyes:      General: No scleral icterus.     Pupils: Pupils are equal, round, and reactive to light.   Cardiovascular:      Rate and Rhythm: Normal rate and regular rhythm.   Pulmonary:      Effort: Pulmonary effort is normal. No respiratory distress.   Abdominal:      General: There is no distension.      Palpations: Abdomen is soft.      Tenderness: There is no abdominal tenderness. There is no guarding or rebound.   Musculoskeletal:         General: Normal range of motion.      Cervical back: Normal range of motion and neck supple.      Right lower leg: No edema.      Left lower leg: No edema.   Skin:     General: Skin is warm and dry.   Neurological:      General: No focal deficit present.      Mental Status: He is alert and oriented to person, place, and time.   Psychiatric:         Mood and Affect: Mood normal.         Behavior: Behavior normal.         Minute visit, more than 50% which was spent counseling

## 2024-12-06 ENCOUNTER — TELEPHONE (OUTPATIENT)
Age: 29
End: 2024-12-06

## 2024-12-06 NOTE — TELEPHONE ENCOUNTER
Patients GI provider:  Dr. York    Number to return call: [phone]     Reason for call: Pt calling to confirm when the provider will write the letter for [employer] stating no further treatment is being done.  Please upload to [email or web address] or email.The patient needs this as soon as possible.     Scheduled procedure/appointment date if applicable: Appt 7/24/25

## 2024-12-09 NOTE — TELEPHONE ENCOUNTER
Patients GI provider:  Dr. York    Number to return call: [phone]    Reason for call: Pt calling requesting that note to be done before holidays. Please place letter    Scheduled procedure/appointment date if applicable: Apt 7/24/25

## 2024-12-11 ENCOUNTER — TELEPHONE (OUTPATIENT)
Age: 29
End: 2024-12-11

## 2024-12-11 NOTE — TELEPHONE ENCOUNTER
Radha from [facility] calling in, she is requesting clinical information to be faxed to her at [phone] so she can submit auth for [employer]     If any questions  # [phone]

## 2024-12-12 ENCOUNTER — TELEPHONE (OUTPATIENT)
Dept: GASTROENTEROLOGY | Facility: CLINIC | Age: 29
End: 2024-12-12

## 2024-12-12 NOTE — TELEPHONE ENCOUNTER
Patient walked into [address] [place] [address] to follow up on letter he requested on 12/06/2024. Patient wanted to check to see if Dr. York received the message. I did explain to him that often providers are scoping etc, and there can be a delay in response time. Patient did understand, but would like to reiterate that this would need to be done ASAP as the department he needs to send it to will be out of the office for the Holiday Season. I advised him I would send another message to check on the status. Thank you. .

## 2025-03-27 ENCOUNTER — TELEPHONE (OUTPATIENT)
Age: 30
End: 2025-03-27

## 2025-05-01 ENCOUNTER — TELEPHONE (OUTPATIENT)
Age: 30
End: 2025-05-01

## 2025-05-01 NOTE — TELEPHONE ENCOUNTER
I called and spoke with the patient. Patient states last Enytvio infusion in Feb/Mar and pt states is not continuing infusion at this time and to be discussed at OV in July. Pt asking if order can be postponed by [facility].

## 2025-05-01 NOTE — TELEPHONE ENCOUNTER
Patients GI provider:  Dr. COCHRAN    Number to return call: [phone]    Reason for call: [facility] contacted office stating they were unable to get ahold of pt for Entyvio delivery. They requested outr office attempt to get in touch with patient.

## 2025-05-02 NOTE — TELEPHONE ENCOUNTER
I called [facility] and spoke with Radha and relayed postponement of pts Entyvio until further discussed with provider in July. Radha to write a note in pts chart, [facility] requesting a note to resume therapy, new order and clinicals when pt and provider agreeable to resume Enyvio infusions.

## 2025-06-13 ENCOUNTER — HOSPITAL ENCOUNTER (EMERGENCY)
Facility: HOSPITAL | Age: 30
Discharge: HOME | End: 2025-06-13
Attending: STUDENT IN AN ORGANIZED HEALTH CARE EDUCATION/TRAINING PROGRAM | Admitting: STUDENT IN AN ORGANIZED HEALTH CARE EDUCATION/TRAINING PROGRAM
Payer: COMMERCIAL

## 2025-06-13 VITALS
RESPIRATION RATE: 20 BRPM | HEART RATE: 85 BPM | SYSTOLIC BLOOD PRESSURE: 127 MMHG | TEMPERATURE: 97.8 F | OXYGEN SATURATION: 99 % | DIASTOLIC BLOOD PRESSURE: 75 MMHG

## 2025-06-13 DIAGNOSIS — Z20.2 POSSIBLE EXPOSURE TO STD: Primary | ICD-10-CM

## 2025-06-13 PROCEDURE — 87563 M. GENITALIUM AMP PROBE: CPT | Performed by: STUDENT IN AN ORGANIZED HEALTH CARE EDUCATION/TRAINING PROGRAM

## 2025-06-13 PROCEDURE — 99283 EMERGENCY DEPT VISIT LOW MDM: CPT | Performed by: STUDENT IN AN ORGANIZED HEALTH CARE EDUCATION/TRAINING PROGRAM

## 2025-06-13 PROCEDURE — 87491 CHLMYD TRACH DNA AMP PROBE: CPT | Performed by: STUDENT IN AN ORGANIZED HEALTH CARE EDUCATION/TRAINING PROGRAM

## 2025-06-13 PROCEDURE — 87591 N.GONORRHOEAE DNA AMP PROB: CPT | Performed by: STUDENT IN AN ORGANIZED HEALTH CARE EDUCATION/TRAINING PROGRAM

## 2025-06-13 PROCEDURE — 99283 EMERGENCY DEPT VISIT LOW MDM: CPT

## 2025-06-13 PROCEDURE — 87661 TRICHOMONAS VAGINALIS AMPLIF: CPT | Performed by: STUDENT IN AN ORGANIZED HEALTH CARE EDUCATION/TRAINING PROGRAM

## 2025-06-14 NOTE — DISCHARGE INSTRUCTIONS
You have been evaluated in the Emergency Department today for possible STD exposure. You were tested today for gonorrhea and chlamydia and the results are still pending. You will be contacted if the results are positive. You should follow up with your primary care provider for further STI testing.    Please follow up with your primary care physician within two days.    Return to the Emergency Department if you experience fevers 100.4° or greater, worsening or uncontrolled pain, rashes, sores, vomiting, or for any other concerning symptoms.    Sexually Transmitted Disease Clinics:    [facility]    Tuesday 2p-4p  Wednesday 6p-8p  Free confidential treatment; walk-in    [facility]       [facility]  Mondays only 5p-7p    [facility]:    [place]side clinic - [place] [address] [place] [address]  Tuesday 2p-4p  Wednesday 6p-8p    [facility] Emergency Department

## 2025-06-14 NOTE — ED PROVIDER NOTES
Time reflects when diagnosis was documented in both MDM as applicable and the Disposition within this note       Time User Action Codes Description Comment    6/13/2025 10:29 PM Krishna Steiner Add [Z20.2] Possible exposure to STD           ED Disposition       ED Disposition   Discharge    Condition   Stable    Date/Time   Fri Jun 13, 2025 10:29 PM    Comment   Kely Mena discharge to home/self care.                   Assessment & Plan       Medical Decision Making  Patient is a 30 y.o. male who presents to the ED for STD testing.  Patient is nontoxic, well-appearing.     Differential includes but is not limited to: Possible STD exposure.  Presentation not consistent with UTI (no symptoms).    Plan: STD testing, discharge                   Amount and/or Complexity of Data Reviewed  Labs: ordered.             Medications - No data to display    ED Risk Strat Scores                    No data recorded                            History of Present Illness       Chief Complaint   Patient presents with    Exposure to STD     Requesting std testing,  no symptoms. States girlfiend states she feels  a little something and wants to be checked       Past Medical History[1]   Past Surgical History[2]   Family History[3]   Social History[4]   E-Cigarette/Vaping    E-Cigarette Use Never User       E-Cigarette/Vaping Substances    Nicotine No     THC No     CBD No     Flavoring No     Other No     Unknown No       I have reviewed and agree with the history as documented.     30-year-old male who presents the emergency room for STD testing.  Denies symptoms.  States that his significant other was told he might have an STD so he would like to be tested.  Does not want empiric treatment.  No other complaints or concerns.      Exposure to STD      Review of Systems   All other systems reviewed and are negative.          Objective       ED Triage Vitals [06/13/25 2216]   Temperature Pulse Blood Pressure Respirations SpO2 Patient  Position - Orthostatic VS   97.8 °F (36.6 °C) 85 127/75 20 99 % Sitting      Temp Source Heart Rate Source BP Location FiO2 (%) Pain Score    Tympanic Monitor Right arm -- No Pain      Vitals      Date and Time Temp Pulse SpO2 Resp BP Pain Score FACES Pain Rating User   06/13/25 2216 97.8 °F (36.6 °C) 85 99 % 20 127/75 No Pain -- LS            Physical Exam  Vitals and nursing note reviewed.   Constitutional:       General: He is not in acute distress.     Appearance: He is well-developed. He is not ill-appearing, toxic-appearing or diaphoretic.   HENT:      Head: Normocephalic and atraumatic.      Right Ear: External ear normal.      Left Ear: External ear normal.      Nose: Nose normal.     Eyes:      General: Lids are normal. No scleral icterus.      Cardiovascular:      Rate and Rhythm: Normal rate and regular rhythm.   Pulmonary:      Effort: Pulmonary effort is normal. No respiratory distress.     Skin:     General: Skin is warm and dry.     Neurological:      General: No focal deficit present.      Mental Status: He is alert.     Psychiatric:         Mood and Affect: Mood normal.         Behavior: Behavior normal.         Results Reviewed       Procedure Component Value Units Date/Time    Chlamydia/GC amplified DNA by PCR [[id number]] Collected: 06/13/25 2233    Lab Status: In process Specimen: Urine, Other Updated: 06/13/25 2242    Trichomonas vaginalis/Mycoplasma genitalium PCR [[id number]] Collected: 06/13/25 2236    Lab Status: In process Specimen: Urine, Clean Catch Updated: 06/13/25 2242            No orders to display       Procedures    ED Medication and Procedure Management   None     There are no discharge medications for this patient.    No discharge procedures on file.  ED SEPSIS DOCUMENTATION   Time reflects when diagnosis was documented in both MDM as applicable and the Disposition within this note       Time User Action Codes Description Comment    6/13/2025 10:29 PM Krishna Steiner Add [Z20.2]  Possible exposure to STD                    [1]   Past Medical History:  Diagnosis Date    Asthma     Crohn disease (HCC)    [2]   Past Surgical History:  Procedure Laterality Date    COLONOSCOPY     [3]   Family History  Problem Relation Name Age of Onset    Asthma Mother     [4]   Social History  Tobacco Use    Smoking status: Never    Smokeless tobacco: Never   Vaping Use    Vaping status: Never Used   Substance Use Topics    Alcohol use: Never    Drug use: Never        Krishna Steiner DO  06/14/25 0032

## 2025-06-16 LAB
C TRACH DNA SPEC QL NAA+PROBE: NEGATIVE
M GENITALIUM DNA SPEC QL NAA+PROBE: NEGATIVE
N GONORRHOEA DNA SPEC QL NAA+PROBE: NEGATIVE
T VAGINALIS DNA SPEC QL NAA+PROBE: NEGATIVE

## 2025-07-24 ENCOUNTER — OFFICE VISIT (OUTPATIENT)
Age: 30
End: 2025-07-24
Payer: COMMERCIAL

## 2025-07-24 VITALS
HEIGHT: 65 IN | DIASTOLIC BLOOD PRESSURE: 75 MMHG | SYSTOLIC BLOOD PRESSURE: 110 MMHG | WEIGHT: 140 LBS | HEART RATE: 84 BPM | BODY MASS INDEX: 23.32 KG/M2

## 2025-07-24 DIAGNOSIS — K50.80 CROHN'S DISEASE OF BOTH SMALL AND LARGE INTESTINE WITHOUT COMPLICATION (HCC): Primary | ICD-10-CM

## 2025-07-24 PROCEDURE — 99213 OFFICE O/P EST LOW 20 MIN: CPT | Performed by: INTERNAL MEDICINE

## 2025-07-24 NOTE — ASSESSMENT & PLAN NOTE
Continues in symptomatic remission off of all treatment of his Crohn's.  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  Some bulging at the anus, possibly related to visualized external skin tag or possibly anal fissure.  Will maximize dietary fiber intake.  Can perform sitz bath.  Will contact this office if symptoms are worsening, otherwise we will follow-up in 6 months

## 2025-07-24 NOTE — PROGRESS NOTES
"Name: Kely Mena      : 1995      MRN: [id number]  Encounter Provider: Vaughn York MD  Encounter Date: 2025   Encounter department: [facility]  :  Assessment & Plan  Crohn's disease of both small and large intestine without complication (HCC)  Continues in symptomatic remission off of all treatment of his Crohn's.  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  Some bulging at the anus, possibly related to visualized external skin tag or possibly anal fissure.  Will maximize dietary fiber intake.  Can perform sitz bath.  Will contact this office if symptoms are worsening, otherwise we will follow-up in 6 months           History of Present Illness   Kely Mena is a 30 y.o. male who presents in follow-up of longstanding Crohn's.  He discontinued maintenance therapy with [facility] in 2024 in order to pursue basic training for the [employer].  He has been doing well without symptoms, namely no abdominal pain, change in bowel habit or blood in his stool.  Has had some intermittent bulging at the anus.  Has not yet begun basic training  HPI    Review of Systems A complete review of systems is negative other than that noted above in the HPI.      Current Medications[1]  Objective   /75 (BP Location: Left arm, Patient Position: Sitting, Cuff Size: Standard)   Pulse 84   Ht 5' 5\" (1.651 m)   Wt 63.5 kg (140 lb)   BMI 23.30 kg/m²     Physical Exam  Vitals and nursing note reviewed.   Constitutional:       General: He is not in acute distress.  HENT:      Head: Normocephalic and atraumatic.      Mouth/Throat:      Mouth: Mucous membranes are moist.     Eyes:      General: No scleral icterus.     Pupils: Pupils are equal, round, and reactive to light.       Cardiovascular:      Rate and Rhythm: Normal rate and regular rhythm.   Pulmonary:      Effort: Pulmonary effort is normal. No respiratory distress.   Abdominal:      General: There is no " distension.      Palpations: Abdomen is soft.   Genitourinary:     Comments: Small external skin tag, no induration, fluctuance, erythema.  Discomfort with digital exam.  No blood.  Some anal stenosis.    Musculoskeletal:         General: Normal range of motion.      Cervical back: Normal range of motion and neck supple.     Skin:     General: Skin is warm and dry.     Neurological:      General: No focal deficit present.      Mental Status: He is alert and oriented to person, place, and time.     Psychiatric:         Mood and Affect: Mood normal.         Behavior: Behavior normal.            Lab Results: I personally reviewed relevant lab results.       Results for orders placed during the hospital encounter of 07/23/24    Colonoscopy    Impression  The tendon colon was somewhat fixed and angulated with pseudopolyposis.  Biopsies obtained.  Mild erythema in the cecum with some friability.  The ileocecal valve appeared stenotic and could not be cannulated with the colonoscope.  Performed forceps biopsies in the cecum, ascending colon, transverse colon, descending colon, sigmoid colon and rectum        RECOMMENDATION:  Await pathology results  Repeat colonoscopy in 2 years, due: 7/23/2026  Inflammatory bowel disease    Follow up with [facility]            Vaughn York MD             [1]   No current outpatient medications on file.     No current facility-administered medications for this visit.

## 2025-08-07 ENCOUNTER — TELEPHONE (OUTPATIENT)
Age: 30
End: 2025-08-07